# Patient Record
Sex: FEMALE | Race: WHITE | Employment: OTHER
[De-identification: names, ages, dates, MRNs, and addresses within clinical notes are randomized per-mention and may not be internally consistent; named-entity substitution may affect disease eponyms.]

---

## 2017-03-13 ENCOUNTER — RECORDS - HEALTHEAST (OUTPATIENT)
Dept: ADMINISTRATIVE | Facility: OTHER | Age: 77
End: 2017-03-13

## 2017-04-18 ENCOUNTER — AMBULATORY - HEALTHEAST (OUTPATIENT)
Dept: ONCOLOGY | Facility: CLINIC | Age: 77
End: 2017-04-18

## 2017-04-18 DIAGNOSIS — C50.512 BREAST CANCER OF LOWER-OUTER QUADRANT OF LEFT FEMALE BREAST (H): ICD-10-CM

## 2017-04-19 ENCOUNTER — OFFICE VISIT - HEALTHEAST (OUTPATIENT)
Dept: ONCOLOGY | Facility: CLINIC | Age: 77
End: 2017-04-19

## 2017-04-19 ENCOUNTER — AMBULATORY - HEALTHEAST (OUTPATIENT)
Dept: INFUSION THERAPY | Facility: CLINIC | Age: 77
End: 2017-04-19

## 2017-04-19 DIAGNOSIS — C50.512 BREAST CANCER OF LOWER-OUTER QUADRANT OF LEFT FEMALE BREAST (H): ICD-10-CM

## 2017-04-19 DIAGNOSIS — Z12.31 ENCOUNTER FOR SCREENING MAMMOGRAM FOR MALIGNANT NEOPLASM OF BREAST: ICD-10-CM

## 2017-04-19 ASSESSMENT — MIFFLIN-ST. JEOR: SCORE: 1201.25

## 2017-06-18 ENCOUNTER — COMMUNICATION - HEALTHEAST (OUTPATIENT)
Dept: FAMILY MEDICINE | Facility: CLINIC | Age: 77
End: 2017-06-18

## 2017-06-18 DIAGNOSIS — K21.9 GASTROESOPHAGEAL REFLUX DISEASE WITHOUT ESOPHAGITIS: ICD-10-CM

## 2017-06-18 DIAGNOSIS — I10 UNSPECIFIED ESSENTIAL HYPERTENSION: ICD-10-CM

## 2017-06-18 DIAGNOSIS — E78.00 PURE HYPERCHOLESTEROLEMIA: ICD-10-CM

## 2017-06-20 ENCOUNTER — COMMUNICATION - HEALTHEAST (OUTPATIENT)
Dept: ADMINISTRATIVE | Facility: CLINIC | Age: 77
End: 2017-06-20

## 2017-06-20 ENCOUNTER — OFFICE VISIT - HEALTHEAST (OUTPATIENT)
Dept: FAMILY MEDICINE | Facility: CLINIC | Age: 77
End: 2017-06-20

## 2017-06-20 DIAGNOSIS — E78.00 PURE HYPERCHOLESTEROLEMIA: ICD-10-CM

## 2017-06-20 DIAGNOSIS — C50.512 BREAST CANCER OF LOWER-OUTER QUADRANT OF LEFT FEMALE BREAST (H): ICD-10-CM

## 2017-06-20 DIAGNOSIS — M89.9 DISORDER OF BONE AND CARTILAGE: ICD-10-CM

## 2017-06-20 DIAGNOSIS — Z01.810 PRE-OPERATIVE CARDIOVASCULAR EXAMINATION: ICD-10-CM

## 2017-06-20 DIAGNOSIS — M20.41 HAMMER TOE OF RIGHT FOOT: ICD-10-CM

## 2017-06-20 DIAGNOSIS — I10 ESSENTIAL HYPERTENSION: ICD-10-CM

## 2017-06-20 DIAGNOSIS — I48.91 ATRIAL FIBRILLATION (H): ICD-10-CM

## 2017-06-20 DIAGNOSIS — K21.9 GASTROESOPHAGEAL REFLUX DISEASE WITHOUT ESOPHAGITIS: ICD-10-CM

## 2017-06-20 DIAGNOSIS — M94.9 DISORDER OF BONE AND CARTILAGE: ICD-10-CM

## 2017-06-20 DIAGNOSIS — R79.89 ELEVATED LFTS: ICD-10-CM

## 2017-06-20 LAB
ATRIAL RATE - MUSE: 69 BPM
CHOLEST SERPL-MCNC: 165 MG/DL
DIASTOLIC BLOOD PRESSURE - MUSE: NORMAL MMHG
FASTING STATUS PATIENT QL REPORTED: ABNORMAL
HDLC SERPL-MCNC: 44 MG/DL
INTERPRETATION ECG - MUSE: NORMAL
LDLC SERPL CALC-MCNC: 102 MG/DL
P AXIS - MUSE: NORMAL DEGREES
PR INTERVAL - MUSE: NORMAL MS
QRS DURATION - MUSE: 76 MS
QT - MUSE: 400 MS
QTC - MUSE: 444 MS
R AXIS - MUSE: -12 DEGREES
SYSTOLIC BLOOD PRESSURE - MUSE: NORMAL MMHG
T AXIS - MUSE: 5 DEGREES
TRIGL SERPL-MCNC: 96 MG/DL
VENTRICULAR RATE- MUSE: 74 BPM

## 2017-06-20 ASSESSMENT — MIFFLIN-ST. JEOR: SCORE: 1195.01

## 2017-06-21 ENCOUNTER — HOSPITAL ENCOUNTER (OUTPATIENT)
Dept: CARDIOLOGY | Facility: CLINIC | Age: 77
Discharge: HOME | End: 2017-06-21
Attending: FAMILY MEDICINE

## 2017-06-21 ENCOUNTER — COMMUNICATION - HEALTHEAST (OUTPATIENT)
Dept: FAMILY MEDICINE | Facility: CLINIC | Age: 77
End: 2017-06-21

## 2017-06-21 DIAGNOSIS — I48.91 ATRIAL FIBRILLATION (H): ICD-10-CM

## 2017-06-21 LAB
AORTIC ROOT: 3 CM
BSA FOR ECHO PROCEDURE: 1.83 M2
CV BLOOD PRESSURE: NORMAL MMHG
CV ECHO HEIGHT: 64 IN
CV ECHO WEIGHT: 164 LBS
DOP CALC LVOT AREA: 2.83 CM2
DOP CALC LVOT DIAMETER: 1.9 CM
DOP CALC LVOT PEAK VEL: 77.5 CM/S
DOP CALC LVOT STROKE VOLUME: 43.1 CM3
DOP CALCLVOT PEAK VEL VTI: 15.2 CM
EJECTION FRACTION: 55 % (ref 55–75)
FRACTIONAL SHORTENING: 25.8 % (ref 28–44)
INTERVENTRICULAR SEPTUM IN END DIASTOLE: 0.89 CM (ref 0.6–0.9)
IVS/PW RATIO: 0.9
LA AREA 1: 20.7 CM2
LA AREA 2: 21.7 CM2
LEFT ATRIUM LENGTH: 5.4 CM
LEFT ATRIUM SIZE: 3.9 CM
LEFT ATRIUM TO AORTIC ROOT RATIO: 1.3 NO UNITS
LEFT ATRIUM VOLUME INDEX: 38.6 ML/M2
LEFT ATRIUM VOLUME: 70.7 CM3
LEFT VENTRICLE DIASTOLIC VOLUME INDEX: 36.1 CM3/M2 (ref 34–74)
LEFT VENTRICLE DIASTOLIC VOLUME: 66 CM3 (ref 46–106)
LEFT VENTRICLE MASS INDEX: 77.3 G/M2
LEFT VENTRICLE SYSTOLIC VOLUME INDEX: 16.4 CM3/M2 (ref 11–31)
LEFT VENTRICLE SYSTOLIC VOLUME: 30 CM3 (ref 14–42)
LEFT VENTRICULAR INTERNAL DIMENSION IN DIASTOLE: 4.5 CM (ref 3.8–5.2)
LEFT VENTRICULAR INTERNAL DIMENSION IN SYSTOLE: 3.34 CM (ref 2.2–3.5)
LEFT VENTRICULAR MASS: 141.5 G
LEFT VENTRICULAR OUTFLOW TRACT MEAN GRADIENT: 1 MMHG
LEFT VENTRICULAR OUTFLOW TRACT MEAN VELOCITY: 48 CM/S
LEFT VENTRICULAR OUTFLOW TRACT PEAK GRADIENT: 2 MMHG
LEFT VENTRICULAR POSTERIOR WALL IN END DIASTOLE: 0.99 CM (ref 0.6–0.9)
LV STROKE VOLUME INDEX: 23.5 ML/M2
MITRAL REGURGITANT VELOCITY TIME INTEGRAL: 212 CM
MR FLOW: 34 CM3
MR MEAN GRADIENT: 112 MMHG
MR MEAN VELOCITY: 511 CM/S
MR PEAK GRADIENT: 147.4 MMHG
MR PISA EROA: 0.2 CM2
MR PISA RADIUS: 0.7 CM
MR PISA VN NYQUIST: 30.8 CM/S
MV DECELERATION TIME: 155 MS
MV E'TISSUE VEL-LAT: 15.1 CM/S
MV LATERAL E/E' RATIO: 7.7
MV PEAK E VELOCITY: 117 CM/S
MV REGURGITANT VOLUME: 33.1 CC
NUC REST DIASTOLIC VOLUME INDEX: 2624 LBS
NUC REST SYSTOLIC VOLUME INDEX: 64 IN
PISA MR PEAK VEL: 607 CM/S
TRICUSPID REGURGITATION PEAK PRESSURE GRADIENT: 42.5 MMHG
TRICUSPID VALVE ANULAR PLANE SYSTOLIC EXCURSION: 1.7 CM
TRICUSPID VALVE PEAK REGURGITANT VELOCITY: 326 CM/S

## 2017-06-21 ASSESSMENT — MIFFLIN-ST. JEOR: SCORE: 1183.9

## 2017-08-02 ENCOUNTER — COMMUNICATION - HEALTHEAST (OUTPATIENT)
Dept: FAMILY MEDICINE | Facility: CLINIC | Age: 77
End: 2017-08-02

## 2017-08-03 ENCOUNTER — OFFICE VISIT - HEALTHEAST (OUTPATIENT)
Dept: GERIATRICS | Facility: CLINIC | Age: 77
End: 2017-08-03

## 2017-08-03 DIAGNOSIS — N30.00 ACUTE CYSTITIS WITHOUT HEMATURIA: ICD-10-CM

## 2017-08-03 DIAGNOSIS — G40.209 PARTIAL SYMPTOMATIC EPILEPSY WITH COMPLEX PARTIAL SEIZURES, NOT INTRACTABLE, WITHOUT STATUS EPILEPTICUS (H): ICD-10-CM

## 2017-08-03 DIAGNOSIS — G93.40 ENCEPHALOPATHY: ICD-10-CM

## 2017-08-03 DIAGNOSIS — I48.20 CHRONIC ATRIAL FIBRILLATION (H): ICD-10-CM

## 2017-08-08 ENCOUNTER — OFFICE VISIT - HEALTHEAST (OUTPATIENT)
Dept: GERIATRICS | Facility: CLINIC | Age: 77
End: 2017-08-08

## 2017-08-08 DIAGNOSIS — G40.209 PARTIAL SYMPTOMATIC EPILEPSY WITH COMPLEX PARTIAL SEIZURES, NOT INTRACTABLE, WITHOUT STATUS EPILEPTICUS (H): ICD-10-CM

## 2017-08-08 DIAGNOSIS — G93.40 ENCEPHALOPATHY: ICD-10-CM

## 2017-08-08 DIAGNOSIS — N30.00 ACUTE CYSTITIS WITHOUT HEMATURIA: ICD-10-CM

## 2017-08-08 DIAGNOSIS — I48.20 CHRONIC ATRIAL FIBRILLATION (H): ICD-10-CM

## 2017-08-10 ENCOUNTER — OFFICE VISIT - HEALTHEAST (OUTPATIENT)
Dept: GERIATRICS | Facility: CLINIC | Age: 77
End: 2017-08-10

## 2017-08-10 DIAGNOSIS — I48.20 CHRONIC ATRIAL FIBRILLATION (H): ICD-10-CM

## 2017-08-10 DIAGNOSIS — I10 ESSENTIAL HYPERTENSION: ICD-10-CM

## 2017-08-10 DIAGNOSIS — R56.9 CONVULSIONS, UNSPECIFIED CONVULSION TYPE (H): ICD-10-CM

## 2017-08-11 ENCOUNTER — AMBULATORY - HEALTHEAST (OUTPATIENT)
Dept: GERIATRICS | Facility: CLINIC | Age: 77
End: 2017-08-11

## 2017-08-14 ENCOUNTER — RECORDS - HEALTHEAST (OUTPATIENT)
Dept: ADMINISTRATIVE | Facility: OTHER | Age: 77
End: 2017-08-14

## 2017-08-21 ENCOUNTER — COMMUNICATION - HEALTHEAST (OUTPATIENT)
Dept: INTERNAL MEDICINE | Facility: CLINIC | Age: 77
End: 2017-08-21

## 2017-08-21 ENCOUNTER — OFFICE VISIT - HEALTHEAST (OUTPATIENT)
Dept: FAMILY MEDICINE | Facility: CLINIC | Age: 77
End: 2017-08-21

## 2017-08-21 DIAGNOSIS — I10 ESSENTIAL HYPERTENSION WITH GOAL BLOOD PRESSURE LESS THAN 140/90: ICD-10-CM

## 2017-08-21 DIAGNOSIS — I48.20 CHRONIC A-FIB (H): ICD-10-CM

## 2017-08-21 DIAGNOSIS — M94.9 DISORDER OF BONE AND CARTILAGE: ICD-10-CM

## 2017-08-21 DIAGNOSIS — E78.00 PURE HYPERCHOLESTEROLEMIA: ICD-10-CM

## 2017-08-21 DIAGNOSIS — G40.209 PARTIAL SYMPTOMATIC EPILEPSY WITH COMPLEX PARTIAL SEIZURES, NOT INTRACTABLE, WITHOUT STATUS EPILEPTICUS (H): ICD-10-CM

## 2017-08-21 DIAGNOSIS — M89.9 DISORDER OF BONE AND CARTILAGE: ICD-10-CM

## 2017-08-21 DIAGNOSIS — Z76.89 ENCOUNTER TO ESTABLISH CARE WITH NEW DOCTOR: ICD-10-CM

## 2017-08-23 ENCOUNTER — COMMUNICATION - HEALTHEAST (OUTPATIENT)
Dept: FAMILY MEDICINE | Facility: CLINIC | Age: 77
End: 2017-08-23

## 2017-08-24 ENCOUNTER — COMMUNICATION - HEALTHEAST (OUTPATIENT)
Dept: SCHEDULING | Facility: CLINIC | Age: 77
End: 2017-08-24

## 2017-08-24 ENCOUNTER — COMMUNICATION - HEALTHEAST (OUTPATIENT)
Dept: FAMILY MEDICINE | Facility: CLINIC | Age: 77
End: 2017-08-24

## 2017-08-24 DIAGNOSIS — I48.91 ATRIAL FIBRILLATION, UNSPECIFIED TYPE (H): ICD-10-CM

## 2017-08-28 ENCOUNTER — COMMUNICATION - HEALTHEAST (OUTPATIENT)
Dept: INTERNAL MEDICINE | Facility: CLINIC | Age: 77
End: 2017-08-28

## 2017-08-28 ENCOUNTER — AMBULATORY - HEALTHEAST (OUTPATIENT)
Dept: LAB | Facility: CLINIC | Age: 77
End: 2017-08-28

## 2017-08-28 DIAGNOSIS — I48.20 CHRONIC A-FIB (H): ICD-10-CM

## 2017-09-05 ENCOUNTER — COMMUNICATION - HEALTHEAST (OUTPATIENT)
Dept: INTERNAL MEDICINE | Facility: CLINIC | Age: 77
End: 2017-09-05

## 2017-09-05 ENCOUNTER — AMBULATORY - HEALTHEAST (OUTPATIENT)
Dept: LAB | Facility: CLINIC | Age: 77
End: 2017-09-05

## 2017-09-05 DIAGNOSIS — I48.20 CHRONIC A-FIB (H): ICD-10-CM

## 2017-09-08 ENCOUNTER — COMMUNICATION - HEALTHEAST (OUTPATIENT)
Dept: FAMILY MEDICINE | Facility: CLINIC | Age: 77
End: 2017-09-08

## 2017-09-12 ENCOUNTER — COMMUNICATION - HEALTHEAST (OUTPATIENT)
Dept: FAMILY MEDICINE | Facility: CLINIC | Age: 77
End: 2017-09-12

## 2017-09-12 ENCOUNTER — OFFICE VISIT - HEALTHEAST (OUTPATIENT)
Dept: GERIATRICS | Facility: CLINIC | Age: 77
End: 2017-09-12

## 2017-09-12 DIAGNOSIS — S32.9XXA PELVIC FRACTURE (H): ICD-10-CM

## 2017-09-12 DIAGNOSIS — I48.20 CHRONIC ATRIAL FIBRILLATION (H): ICD-10-CM

## 2017-09-12 DIAGNOSIS — S32.502A: ICD-10-CM

## 2017-09-12 DIAGNOSIS — I10 ESSENTIAL HYPERTENSION WITH GOAL BLOOD PRESSURE LESS THAN 140/90: ICD-10-CM

## 2017-09-12 DIAGNOSIS — S32.009A: ICD-10-CM

## 2017-09-13 ENCOUNTER — COMMUNICATION - HEALTHEAST (OUTPATIENT)
Dept: INTERNAL MEDICINE | Facility: CLINIC | Age: 77
End: 2017-09-13

## 2017-09-13 DIAGNOSIS — I48.20 CHRONIC A-FIB (H): ICD-10-CM

## 2017-09-14 ENCOUNTER — OFFICE VISIT - HEALTHEAST (OUTPATIENT)
Dept: GERIATRICS | Facility: CLINIC | Age: 77
End: 2017-09-14

## 2017-09-14 DIAGNOSIS — S32.502A: ICD-10-CM

## 2017-09-14 DIAGNOSIS — R58 INTRAABDOMINAL HEMORRHAGE: ICD-10-CM

## 2017-09-14 DIAGNOSIS — I48.20 CHRONIC A-FIB (H): ICD-10-CM

## 2017-09-14 DIAGNOSIS — G40.209 PARTIAL SYMPTOMATIC EPILEPSY WITH COMPLEX PARTIAL SEIZURES, NOT INTRACTABLE, WITHOUT STATUS EPILEPTICUS (H): ICD-10-CM

## 2017-09-14 DIAGNOSIS — S32.009A: ICD-10-CM

## 2017-09-19 ENCOUNTER — OFFICE VISIT - HEALTHEAST (OUTPATIENT)
Dept: GERIATRICS | Facility: CLINIC | Age: 77
End: 2017-09-19

## 2017-09-19 DIAGNOSIS — I48.20 CHRONIC A-FIB (H): ICD-10-CM

## 2017-09-19 DIAGNOSIS — G40.209 PARTIAL SYMPTOMATIC EPILEPSY WITH COMPLEX PARTIAL SEIZURES, NOT INTRACTABLE, WITHOUT STATUS EPILEPTICUS (H): ICD-10-CM

## 2017-09-19 DIAGNOSIS — S32.9XXA PELVIC FRACTURE (H): ICD-10-CM

## 2017-09-19 DIAGNOSIS — I10 ESSENTIAL HYPERTENSION WITH GOAL BLOOD PRESSURE LESS THAN 140/90: ICD-10-CM

## 2017-09-21 ENCOUNTER — OFFICE VISIT - HEALTHEAST (OUTPATIENT)
Dept: GERIATRICS | Facility: CLINIC | Age: 77
End: 2017-09-21

## 2017-09-21 DIAGNOSIS — I48.20 CHRONIC A-FIB (H): ICD-10-CM

## 2017-09-21 DIAGNOSIS — G40.209 PARTIAL SYMPTOMATIC EPILEPSY WITH COMPLEX PARTIAL SEIZURES, NOT INTRACTABLE, WITHOUT STATUS EPILEPTICUS (H): ICD-10-CM

## 2017-09-21 DIAGNOSIS — S32.9XXA PELVIC FRACTURE (H): ICD-10-CM

## 2017-09-21 DIAGNOSIS — I10 ESSENTIAL HYPERTENSION WITH GOAL BLOOD PRESSURE LESS THAN 140/90: ICD-10-CM

## 2017-09-26 ENCOUNTER — OFFICE VISIT - HEALTHEAST (OUTPATIENT)
Dept: GERIATRICS | Facility: CLINIC | Age: 77
End: 2017-09-26

## 2017-09-26 DIAGNOSIS — S32.9XXA PELVIC FRACTURE (H): ICD-10-CM

## 2017-09-26 DIAGNOSIS — G40.209 PARTIAL SYMPTOMATIC EPILEPSY WITH COMPLEX PARTIAL SEIZURES, NOT INTRACTABLE, WITHOUT STATUS EPILEPTICUS (H): ICD-10-CM

## 2017-09-26 DIAGNOSIS — I48.20 CHRONIC A-FIB (H): ICD-10-CM

## 2017-09-26 DIAGNOSIS — I10 ESSENTIAL HYPERTENSION WITH GOAL BLOOD PRESSURE LESS THAN 140/90: ICD-10-CM

## 2017-09-28 ENCOUNTER — RECORDS - HEALTHEAST (OUTPATIENT)
Dept: ADMINISTRATIVE | Facility: OTHER | Age: 77
End: 2017-09-28

## 2017-09-28 ENCOUNTER — OFFICE VISIT - HEALTHEAST (OUTPATIENT)
Dept: GERIATRICS | Facility: CLINIC | Age: 77
End: 2017-09-28

## 2017-09-28 DIAGNOSIS — I48.20 CHRONIC A-FIB (H): ICD-10-CM

## 2017-09-28 DIAGNOSIS — E78.00 PURE HYPERCHOLESTEROLEMIA: ICD-10-CM

## 2017-09-28 DIAGNOSIS — I10 ESSENTIAL HYPERTENSION WITH GOAL BLOOD PRESSURE LESS THAN 140/90: ICD-10-CM

## 2017-09-28 DIAGNOSIS — S32.9XXA PELVIC FRACTURE (H): ICD-10-CM

## 2017-10-02 ENCOUNTER — OFFICE VISIT - HEALTHEAST (OUTPATIENT)
Dept: GERIATRICS | Facility: CLINIC | Age: 77
End: 2017-10-02

## 2017-10-02 DIAGNOSIS — S32.502A: ICD-10-CM

## 2017-10-02 DIAGNOSIS — I48.20 CHRONIC ATRIAL FIBRILLATION (H): ICD-10-CM

## 2017-10-02 DIAGNOSIS — S32.009A: ICD-10-CM

## 2017-10-02 DIAGNOSIS — E78.00 PURE HYPERCHOLESTEROLEMIA: ICD-10-CM

## 2017-10-04 ENCOUNTER — AMBULATORY - HEALTHEAST (OUTPATIENT)
Dept: GERIATRICS | Facility: CLINIC | Age: 77
End: 2017-10-04

## 2017-10-04 ENCOUNTER — AMBULATORY - HEALTHEAST (OUTPATIENT)
Dept: LAB | Facility: CLINIC | Age: 77
End: 2017-10-04

## 2017-10-04 ENCOUNTER — COMMUNICATION - HEALTHEAST (OUTPATIENT)
Dept: INTERNAL MEDICINE | Facility: CLINIC | Age: 77
End: 2017-10-04

## 2017-10-04 DIAGNOSIS — I48.20 CHRONIC ATRIAL FIBRILLATION (H): ICD-10-CM

## 2017-10-04 DIAGNOSIS — I48.20 CHRONIC A-FIB (H): ICD-10-CM

## 2017-10-05 ENCOUNTER — COMMUNICATION - HEALTHEAST (OUTPATIENT)
Dept: GERIATRICS | Facility: CLINIC | Age: 77
End: 2017-10-05

## 2017-10-10 ENCOUNTER — RECORDS - HEALTHEAST (OUTPATIENT)
Dept: ADMINISTRATIVE | Facility: OTHER | Age: 77
End: 2017-10-10

## 2017-10-16 ENCOUNTER — COMMUNICATION - HEALTHEAST (OUTPATIENT)
Dept: FAMILY MEDICINE | Facility: CLINIC | Age: 77
End: 2017-10-16

## 2017-10-16 ENCOUNTER — COMMUNICATION - HEALTHEAST (OUTPATIENT)
Dept: INTERNAL MEDICINE | Facility: CLINIC | Age: 77
End: 2017-10-16

## 2017-10-16 ENCOUNTER — OFFICE VISIT - HEALTHEAST (OUTPATIENT)
Dept: FAMILY MEDICINE | Facility: CLINIC | Age: 77
End: 2017-10-16

## 2017-10-16 DIAGNOSIS — S32.009A: ICD-10-CM

## 2017-10-16 DIAGNOSIS — M89.9 DISORDER OF BONE AND CARTILAGE: ICD-10-CM

## 2017-10-16 DIAGNOSIS — M94.9 DISORDER OF BONE AND CARTILAGE: ICD-10-CM

## 2017-10-16 DIAGNOSIS — I48.20 CHRONIC A-FIB (H): ICD-10-CM

## 2017-10-16 DIAGNOSIS — Z00.00 PREVENTATIVE HEALTH CARE: ICD-10-CM

## 2017-10-16 DIAGNOSIS — I48.20 CHRONIC ATRIAL FIBRILLATION (H): ICD-10-CM

## 2017-10-16 DIAGNOSIS — G40.209: ICD-10-CM

## 2017-10-16 DIAGNOSIS — S32.9XXA PELVIC FRACTURE (H): ICD-10-CM

## 2017-10-16 DIAGNOSIS — I10 ESSENTIAL HYPERTENSION: ICD-10-CM

## 2017-10-16 DIAGNOSIS — D64.9 ANEMIA, UNSPECIFIED TYPE: ICD-10-CM

## 2017-10-17 ENCOUNTER — COMMUNICATION - HEALTHEAST (OUTPATIENT)
Dept: FAMILY MEDICINE | Facility: CLINIC | Age: 77
End: 2017-10-17

## 2017-10-23 ENCOUNTER — AMBULATORY - HEALTHEAST (OUTPATIENT)
Dept: LAB | Facility: CLINIC | Age: 77
End: 2017-10-23

## 2017-10-23 ENCOUNTER — COMMUNICATION - HEALTHEAST (OUTPATIENT)
Dept: INTERNAL MEDICINE | Facility: CLINIC | Age: 77
End: 2017-10-23

## 2017-10-23 DIAGNOSIS — I48.20 CHRONIC A-FIB (H): ICD-10-CM

## 2017-10-24 ENCOUNTER — HOSPITAL ENCOUNTER (OUTPATIENT)
Dept: NEUROLOGY | Facility: HOSPITAL | Age: 77
Discharge: HOME | End: 2017-10-24
Attending: PSYCHIATRY & NEUROLOGY

## 2017-10-24 DIAGNOSIS — G40.209 COMPLEX PARTIAL SEIZURE (H): ICD-10-CM

## 2017-10-27 ENCOUNTER — COMMUNICATION - HEALTHEAST (OUTPATIENT)
Dept: INTERNAL MEDICINE | Facility: CLINIC | Age: 77
End: 2017-10-27

## 2017-10-27 ENCOUNTER — AMBULATORY - HEALTHEAST (OUTPATIENT)
Dept: LAB | Facility: CLINIC | Age: 77
End: 2017-10-27

## 2017-10-27 ENCOUNTER — COMMUNICATION - HEALTHEAST (OUTPATIENT)
Dept: FAMILY MEDICINE | Facility: CLINIC | Age: 77
End: 2017-10-27

## 2017-10-27 DIAGNOSIS — I48.20 CHRONIC A-FIB (H): ICD-10-CM

## 2017-10-27 DIAGNOSIS — I48.20 CHRONIC ATRIAL FIBRILLATION (H): ICD-10-CM

## 2017-10-31 ENCOUNTER — COMMUNICATION - HEALTHEAST (OUTPATIENT)
Dept: INTERNAL MEDICINE | Facility: CLINIC | Age: 77
End: 2017-10-31

## 2017-10-31 ENCOUNTER — AMBULATORY - HEALTHEAST (OUTPATIENT)
Dept: LAB | Facility: CLINIC | Age: 77
End: 2017-10-31

## 2017-10-31 DIAGNOSIS — I48.20 CHRONIC A-FIB (H): ICD-10-CM

## 2017-11-02 ENCOUNTER — OFFICE VISIT - HEALTHEAST (OUTPATIENT)
Dept: ONCOLOGY | Facility: CLINIC | Age: 77
End: 2017-11-02

## 2017-11-02 ENCOUNTER — RECORDS - HEALTHEAST (OUTPATIENT)
Dept: BONE DENSITY | Facility: CLINIC | Age: 77
End: 2017-11-02

## 2017-11-02 ENCOUNTER — RECORDS - HEALTHEAST (OUTPATIENT)
Dept: ADMINISTRATIVE | Facility: OTHER | Age: 77
End: 2017-11-02

## 2017-11-02 ENCOUNTER — HOSPITAL ENCOUNTER (OUTPATIENT)
Dept: MAMMOGRAPHY | Facility: CLINIC | Age: 77
Discharge: HOME | End: 2017-11-02
Attending: INTERNAL MEDICINE

## 2017-11-02 ENCOUNTER — AMBULATORY - HEALTHEAST (OUTPATIENT)
Dept: INFUSION THERAPY | Facility: CLINIC | Age: 77
End: 2017-11-02

## 2017-11-02 DIAGNOSIS — M94.9 DISORDER OF CARTILAGE, UNSPECIFIED: ICD-10-CM

## 2017-11-02 DIAGNOSIS — C50.512 MALIGNANT NEOPLASM OF LOWER-OUTER QUADRANT OF LEFT BREAST OF FEMALE, ESTROGEN RECEPTOR NEGATIVE (H): ICD-10-CM

## 2017-11-02 DIAGNOSIS — C50.512 BREAST CANCER OF LOWER-OUTER QUADRANT OF LEFT FEMALE BREAST (H): ICD-10-CM

## 2017-11-02 DIAGNOSIS — Z12.31 ENCOUNTER FOR SCREENING MAMMOGRAM FOR MALIGNANT NEOPLASM OF BREAST: ICD-10-CM

## 2017-11-02 DIAGNOSIS — M89.9 DISORDER OF BONE, UNSPECIFIED: ICD-10-CM

## 2017-11-02 DIAGNOSIS — Z17.1 MALIGNANT NEOPLASM OF LOWER-OUTER QUADRANT OF LEFT BREAST OF FEMALE, ESTROGEN RECEPTOR NEGATIVE (H): ICD-10-CM

## 2017-11-02 ASSESSMENT — MIFFLIN-ST. JEOR: SCORE: 1098.39

## 2017-11-06 ENCOUNTER — AMBULATORY - HEALTHEAST (OUTPATIENT)
Dept: LAB | Facility: CLINIC | Age: 77
End: 2017-11-06

## 2017-11-06 ENCOUNTER — COMMUNICATION - HEALTHEAST (OUTPATIENT)
Dept: INTERNAL MEDICINE | Facility: CLINIC | Age: 77
End: 2017-11-06

## 2017-11-06 DIAGNOSIS — I48.20 CHRONIC A-FIB (H): ICD-10-CM

## 2017-11-20 ENCOUNTER — COMMUNICATION - HEALTHEAST (OUTPATIENT)
Dept: INTERNAL MEDICINE | Facility: CLINIC | Age: 77
End: 2017-11-20

## 2017-11-20 ENCOUNTER — AMBULATORY - HEALTHEAST (OUTPATIENT)
Dept: LAB | Facility: CLINIC | Age: 77
End: 2017-11-20

## 2017-11-20 DIAGNOSIS — I48.20 CHRONIC A-FIB (H): ICD-10-CM

## 2017-11-28 ENCOUNTER — COMMUNICATION - HEALTHEAST (OUTPATIENT)
Dept: INTERNAL MEDICINE | Facility: CLINIC | Age: 77
End: 2017-11-28

## 2017-11-28 ENCOUNTER — AMBULATORY - HEALTHEAST (OUTPATIENT)
Dept: LAB | Facility: CLINIC | Age: 77
End: 2017-11-28

## 2017-11-28 DIAGNOSIS — I48.20 CHRONIC A-FIB (H): ICD-10-CM

## 2017-12-05 ENCOUNTER — AMBULATORY - HEALTHEAST (OUTPATIENT)
Dept: LAB | Facility: CLINIC | Age: 77
End: 2017-12-05

## 2017-12-05 ENCOUNTER — COMMUNICATION - HEALTHEAST (OUTPATIENT)
Dept: INTERNAL MEDICINE | Facility: CLINIC | Age: 77
End: 2017-12-05

## 2017-12-05 DIAGNOSIS — I48.20 CHRONIC A-FIB (H): ICD-10-CM

## 2017-12-10 ENCOUNTER — COMMUNICATION - HEALTHEAST (OUTPATIENT)
Dept: FAMILY MEDICINE | Facility: CLINIC | Age: 77
End: 2017-12-10

## 2017-12-10 DIAGNOSIS — E78.00 PURE HYPERCHOLESTEROLEMIA: ICD-10-CM

## 2017-12-10 DIAGNOSIS — I10 ESSENTIAL HYPERTENSION: ICD-10-CM

## 2017-12-10 DIAGNOSIS — K21.9 GASTROESOPHAGEAL REFLUX DISEASE WITHOUT ESOPHAGITIS: ICD-10-CM

## 2017-12-12 ENCOUNTER — COMMUNICATION - HEALTHEAST (OUTPATIENT)
Dept: INTERNAL MEDICINE | Facility: CLINIC | Age: 77
End: 2017-12-12

## 2017-12-12 ENCOUNTER — AMBULATORY - HEALTHEAST (OUTPATIENT)
Dept: LAB | Facility: CLINIC | Age: 77
End: 2017-12-12

## 2017-12-12 DIAGNOSIS — I48.20 CHRONIC A-FIB (H): ICD-10-CM

## 2017-12-19 ENCOUNTER — COMMUNICATION - HEALTHEAST (OUTPATIENT)
Dept: INTERNAL MEDICINE | Facility: CLINIC | Age: 77
End: 2017-12-19

## 2017-12-19 ENCOUNTER — AMBULATORY - HEALTHEAST (OUTPATIENT)
Dept: LAB | Facility: CLINIC | Age: 77
End: 2017-12-19

## 2017-12-19 DIAGNOSIS — I48.20 CHRONIC A-FIB (H): ICD-10-CM

## 2018-01-02 ENCOUNTER — AMBULATORY - HEALTHEAST (OUTPATIENT)
Dept: LAB | Facility: CLINIC | Age: 78
End: 2018-01-02

## 2018-01-02 ENCOUNTER — COMMUNICATION - HEALTHEAST (OUTPATIENT)
Dept: NURSING | Facility: CLINIC | Age: 78
End: 2018-01-02

## 2018-01-02 DIAGNOSIS — I48.20 CHRONIC A-FIB (H): ICD-10-CM

## 2018-01-02 LAB — INR PPP: 2 (ref 0.9–1.1)

## 2018-01-23 ENCOUNTER — COMMUNICATION - HEALTHEAST (OUTPATIENT)
Dept: INTERNAL MEDICINE | Facility: CLINIC | Age: 78
End: 2018-01-23

## 2018-01-23 ENCOUNTER — AMBULATORY - HEALTHEAST (OUTPATIENT)
Dept: LAB | Facility: CLINIC | Age: 78
End: 2018-01-23

## 2018-01-23 DIAGNOSIS — I48.20 CHRONIC A-FIB (H): ICD-10-CM

## 2018-01-23 LAB — INR PPP: 2.3 (ref 0.9–1.1)

## 2018-02-20 ENCOUNTER — AMBULATORY - HEALTHEAST (OUTPATIENT)
Dept: LAB | Facility: CLINIC | Age: 78
End: 2018-02-20

## 2018-02-20 ENCOUNTER — COMMUNICATION - HEALTHEAST (OUTPATIENT)
Dept: INTERNAL MEDICINE | Facility: CLINIC | Age: 78
End: 2018-02-20

## 2018-02-20 DIAGNOSIS — I48.20 CHRONIC A-FIB (H): ICD-10-CM

## 2018-02-20 LAB — INR PPP: 2.1 (ref 0.9–1.1)

## 2018-03-12 ENCOUNTER — COMMUNICATION - HEALTHEAST (OUTPATIENT)
Dept: FAMILY MEDICINE | Facility: CLINIC | Age: 78
End: 2018-03-12

## 2018-03-12 DIAGNOSIS — I10 ESSENTIAL HYPERTENSION: ICD-10-CM

## 2018-03-12 DIAGNOSIS — K21.9 GASTROESOPHAGEAL REFLUX DISEASE WITHOUT ESOPHAGITIS: ICD-10-CM

## 2018-03-12 DIAGNOSIS — E78.00 PURE HYPERCHOLESTEROLEMIA: ICD-10-CM

## 2018-03-29 ENCOUNTER — COMMUNICATION - HEALTHEAST (OUTPATIENT)
Dept: FAMILY MEDICINE | Facility: CLINIC | Age: 78
End: 2018-03-29

## 2018-03-29 DIAGNOSIS — I10 ESSENTIAL HYPERTENSION: ICD-10-CM

## 2018-04-03 ENCOUNTER — COMMUNICATION - HEALTHEAST (OUTPATIENT)
Dept: ANTICOAGULATION | Facility: CLINIC | Age: 78
End: 2018-04-03

## 2018-04-03 ENCOUNTER — AMBULATORY - HEALTHEAST (OUTPATIENT)
Dept: LAB | Facility: CLINIC | Age: 78
End: 2018-04-03

## 2018-04-03 DIAGNOSIS — I48.20 CHRONIC A-FIB (H): ICD-10-CM

## 2018-04-03 LAB — INR PPP: 1.6 (ref 0.9–1.1)

## 2018-04-05 ENCOUNTER — COMMUNICATION - HEALTHEAST (OUTPATIENT)
Dept: FAMILY MEDICINE | Facility: CLINIC | Age: 78
End: 2018-04-05

## 2018-04-19 ENCOUNTER — COMMUNICATION - HEALTHEAST (OUTPATIENT)
Dept: FAMILY MEDICINE | Facility: CLINIC | Age: 78
End: 2018-04-19

## 2018-04-19 DIAGNOSIS — I48.20 CHRONIC A-FIB (H): ICD-10-CM

## 2018-04-19 DIAGNOSIS — I48.20 CHRONIC ATRIAL FIBRILLATION (H): ICD-10-CM

## 2018-04-20 ENCOUNTER — COMMUNICATION - HEALTHEAST (OUTPATIENT)
Dept: ANTICOAGULATION | Facility: CLINIC | Age: 78
End: 2018-04-20

## 2018-04-20 ENCOUNTER — OFFICE VISIT - HEALTHEAST (OUTPATIENT)
Dept: FAMILY MEDICINE | Facility: CLINIC | Age: 78
End: 2018-04-20

## 2018-04-20 DIAGNOSIS — D32.9 MENINGIOMA (H): ICD-10-CM

## 2018-04-20 DIAGNOSIS — I10 ESSENTIAL HYPERTENSION: ICD-10-CM

## 2018-04-20 DIAGNOSIS — E78.00 PURE HYPERCHOLESTEROLEMIA: ICD-10-CM

## 2018-04-20 DIAGNOSIS — M94.9 DISORDER OF BONE AND CARTILAGE: ICD-10-CM

## 2018-04-20 DIAGNOSIS — I48.20 CHRONIC A-FIB (H): ICD-10-CM

## 2018-04-20 DIAGNOSIS — M89.9 DISORDER OF BONE AND CARTILAGE: ICD-10-CM

## 2018-04-20 LAB
ALBUMIN SERPL-MCNC: 3.8 G/DL (ref 3.5–5)
ALP SERPL-CCNC: 88 U/L (ref 45–120)
ALT SERPL W P-5'-P-CCNC: 18 U/L (ref 0–45)
ANION GAP SERPL CALCULATED.3IONS-SCNC: 14 MMOL/L (ref 5–18)
AST SERPL W P-5'-P-CCNC: 21 U/L (ref 0–40)
BILIRUB SERPL-MCNC: 0.6 MG/DL (ref 0–1)
BUN SERPL-MCNC: 17 MG/DL (ref 8–28)
CALCIUM SERPL-MCNC: 9.8 MG/DL (ref 8.5–10.5)
CHLORIDE BLD-SCNC: 106 MMOL/L (ref 98–107)
CHOLEST SERPL-MCNC: 167 MG/DL
CO2 SERPL-SCNC: 24 MMOL/L (ref 22–31)
CREAT SERPL-MCNC: 0.93 MG/DL (ref 0.6–1.1)
FASTING STATUS PATIENT QL REPORTED: YES
GFR SERPL CREATININE-BSD FRML MDRD: 58 ML/MIN/1.73M2
GLUCOSE BLD-MCNC: 93 MG/DL (ref 70–125)
HDLC SERPL-MCNC: 49 MG/DL
INR PPP: 1.7 (ref 0.9–1.1)
LDLC SERPL CALC-MCNC: 98 MG/DL
POTASSIUM BLD-SCNC: 4 MMOL/L (ref 3.5–5)
PROT SERPL-MCNC: 7.1 G/DL (ref 6–8)
SODIUM SERPL-SCNC: 144 MMOL/L (ref 136–145)
TRIGL SERPL-MCNC: 98 MG/DL

## 2018-04-23 ENCOUNTER — COMMUNICATION - HEALTHEAST (OUTPATIENT)
Dept: FAMILY MEDICINE | Facility: CLINIC | Age: 78
End: 2018-04-23

## 2018-04-23 LAB — 25(OH)D3 SERPL-MCNC: 52.1 NG/ML (ref 30–80)

## 2018-04-30 ENCOUNTER — COMMUNICATION - HEALTHEAST (OUTPATIENT)
Dept: ANTICOAGULATION | Facility: CLINIC | Age: 78
End: 2018-04-30

## 2018-04-30 ENCOUNTER — AMBULATORY - HEALTHEAST (OUTPATIENT)
Dept: LAB | Facility: CLINIC | Age: 78
End: 2018-04-30

## 2018-04-30 DIAGNOSIS — I48.20 CHRONIC A-FIB (H): ICD-10-CM

## 2018-04-30 LAB — INR PPP: 2.4 (ref 0.9–1.1)

## 2018-05-02 ENCOUNTER — AMBULATORY - HEALTHEAST (OUTPATIENT)
Dept: INFUSION THERAPY | Facility: CLINIC | Age: 78
End: 2018-05-02

## 2018-05-02 ENCOUNTER — OFFICE VISIT - HEALTHEAST (OUTPATIENT)
Dept: ONCOLOGY | Facility: CLINIC | Age: 78
End: 2018-05-02

## 2018-05-02 DIAGNOSIS — C50.512 MALIGNANT NEOPLASM OF LOWER-OUTER QUADRANT OF LEFT BREAST OF FEMALE, ESTROGEN RECEPTOR NEGATIVE (H): ICD-10-CM

## 2018-05-02 DIAGNOSIS — Z17.1 MALIGNANT NEOPLASM OF LOWER-OUTER QUADRANT OF LEFT BREAST OF FEMALE, ESTROGEN RECEPTOR NEGATIVE (H): ICD-10-CM

## 2018-05-02 ASSESSMENT — MIFFLIN-ST. JEOR: SCORE: 1065.73

## 2018-05-04 ENCOUNTER — COMMUNICATION - HEALTHEAST (OUTPATIENT)
Dept: ANTICOAGULATION | Facility: CLINIC | Age: 78
End: 2018-05-04

## 2018-05-04 DIAGNOSIS — I48.20 CHRONIC ATRIAL FIBRILLATION (H): ICD-10-CM

## 2018-05-14 ENCOUNTER — COMMUNICATION - HEALTHEAST (OUTPATIENT)
Dept: ANTICOAGULATION | Facility: CLINIC | Age: 78
End: 2018-05-14

## 2018-05-14 ENCOUNTER — AMBULATORY - HEALTHEAST (OUTPATIENT)
Dept: LAB | Facility: CLINIC | Age: 78
End: 2018-05-14

## 2018-05-14 DIAGNOSIS — I48.20 CHRONIC A-FIB (H): ICD-10-CM

## 2018-05-14 LAB — INR PPP: 2.9 (ref 0.9–1.1)

## 2018-05-18 ENCOUNTER — COMMUNICATION - HEALTHEAST (OUTPATIENT)
Dept: FAMILY MEDICINE | Facility: CLINIC | Age: 78
End: 2018-05-18

## 2018-06-05 ENCOUNTER — AMBULATORY - HEALTHEAST (OUTPATIENT)
Dept: LAB | Facility: CLINIC | Age: 78
End: 2018-06-05

## 2018-06-05 ENCOUNTER — COMMUNICATION - HEALTHEAST (OUTPATIENT)
Dept: ANTICOAGULATION | Facility: CLINIC | Age: 78
End: 2018-06-05

## 2018-06-05 DIAGNOSIS — I48.20 CHRONIC A-FIB (H): ICD-10-CM

## 2018-06-05 DIAGNOSIS — I48.20 CHRONIC ATRIAL FIBRILLATION (H): ICD-10-CM

## 2018-06-05 LAB — INR PPP: 2.4 (ref 0.9–1.1)

## 2018-07-03 ENCOUNTER — COMMUNICATION - HEALTHEAST (OUTPATIENT)
Dept: ANTICOAGULATION | Facility: CLINIC | Age: 78
End: 2018-07-03

## 2018-07-03 ENCOUNTER — AMBULATORY - HEALTHEAST (OUTPATIENT)
Dept: LAB | Facility: CLINIC | Age: 78
End: 2018-07-03

## 2018-07-03 DIAGNOSIS — I48.20 CHRONIC ATRIAL FIBRILLATION (H): ICD-10-CM

## 2018-07-03 DIAGNOSIS — I48.20 CHRONIC A-FIB (H): ICD-10-CM

## 2018-07-03 LAB — INR PPP: 2.5 (ref 0.9–1.1)

## 2018-07-31 ENCOUNTER — COMMUNICATION - HEALTHEAST (OUTPATIENT)
Dept: ANTICOAGULATION | Facility: CLINIC | Age: 78
End: 2018-07-31

## 2018-07-31 ENCOUNTER — AMBULATORY - HEALTHEAST (OUTPATIENT)
Dept: LAB | Facility: CLINIC | Age: 78
End: 2018-07-31

## 2018-07-31 DIAGNOSIS — I48.20 CHRONIC A-FIB (H): ICD-10-CM

## 2018-07-31 DIAGNOSIS — I48.20 CHRONIC ATRIAL FIBRILLATION (H): ICD-10-CM

## 2018-07-31 LAB — INR PPP: 2.4 (ref 0.9–1.1)

## 2018-09-03 ENCOUNTER — COMMUNICATION - HEALTHEAST (OUTPATIENT)
Dept: FAMILY MEDICINE | Facility: CLINIC | Age: 78
End: 2018-09-03

## 2018-09-03 DIAGNOSIS — I10 ESSENTIAL HYPERTENSION: ICD-10-CM

## 2018-09-03 DIAGNOSIS — K21.9 GASTROESOPHAGEAL REFLUX DISEASE WITHOUT ESOPHAGITIS: ICD-10-CM

## 2018-09-03 DIAGNOSIS — E78.00 PURE HYPERCHOLESTEROLEMIA: ICD-10-CM

## 2018-09-07 ENCOUNTER — RECORDS - HEALTHEAST (OUTPATIENT)
Dept: ADMINISTRATIVE | Facility: OTHER | Age: 78
End: 2018-09-07

## 2018-09-11 ENCOUNTER — AMBULATORY - HEALTHEAST (OUTPATIENT)
Dept: LAB | Facility: CLINIC | Age: 78
End: 2018-09-11

## 2018-09-11 ENCOUNTER — COMMUNICATION - HEALTHEAST (OUTPATIENT)
Dept: ANTICOAGULATION | Facility: CLINIC | Age: 78
End: 2018-09-11

## 2018-09-11 DIAGNOSIS — I48.20 CHRONIC ATRIAL FIBRILLATION (H): ICD-10-CM

## 2018-09-11 DIAGNOSIS — I48.20 CHRONIC A-FIB (H): ICD-10-CM

## 2018-09-11 LAB — INR PPP: 3 (ref 0.9–1.1)

## 2018-09-21 ENCOUNTER — COMMUNICATION - HEALTHEAST (OUTPATIENT)
Dept: FAMILY MEDICINE | Facility: CLINIC | Age: 78
End: 2018-09-21

## 2018-09-21 DIAGNOSIS — I10 ESSENTIAL HYPERTENSION: ICD-10-CM

## 2018-10-08 ENCOUNTER — AMBULATORY - HEALTHEAST (OUTPATIENT)
Dept: LAB | Facility: CLINIC | Age: 78
End: 2018-10-08

## 2018-10-08 ENCOUNTER — COMMUNICATION - HEALTHEAST (OUTPATIENT)
Dept: ANTICOAGULATION | Facility: CLINIC | Age: 78
End: 2018-10-08

## 2018-10-08 DIAGNOSIS — I48.20 CHRONIC A-FIB (H): ICD-10-CM

## 2018-10-08 DIAGNOSIS — I48.20 CHRONIC ATRIAL FIBRILLATION (H): ICD-10-CM

## 2018-10-08 LAB — INR PPP: 2.7 (ref 0.9–1.1)

## 2018-11-02 ENCOUNTER — AMBULATORY - HEALTHEAST (OUTPATIENT)
Dept: INFUSION THERAPY | Facility: CLINIC | Age: 78
End: 2018-11-02

## 2018-11-02 ENCOUNTER — COMMUNICATION - HEALTHEAST (OUTPATIENT)
Dept: ANTICOAGULATION | Facility: CLINIC | Age: 78
End: 2018-11-02

## 2018-11-02 ENCOUNTER — OFFICE VISIT - HEALTHEAST (OUTPATIENT)
Dept: ONCOLOGY | Facility: CLINIC | Age: 78
End: 2018-11-02

## 2018-11-02 DIAGNOSIS — I48.20 CHRONIC A-FIB (H): ICD-10-CM

## 2018-11-02 DIAGNOSIS — I48.20 CHRONIC ATRIAL FIBRILLATION (H): ICD-10-CM

## 2018-11-02 DIAGNOSIS — C50.512 MALIGNANT NEOPLASM OF LOWER-OUTER QUADRANT OF LEFT BREAST OF FEMALE, ESTROGEN RECEPTOR NEGATIVE (H): ICD-10-CM

## 2018-11-02 DIAGNOSIS — Z17.1 MALIGNANT NEOPLASM OF LOWER-OUTER QUADRANT OF LEFT BREAST OF FEMALE, ESTROGEN RECEPTOR NEGATIVE (H): ICD-10-CM

## 2018-11-02 DIAGNOSIS — R56.9 CONVULSIONS, UNSPECIFIED CONVULSION TYPE (H): ICD-10-CM

## 2018-11-02 LAB
ALBUMIN SERPL-MCNC: 4.2 G/DL (ref 3.5–5)
ALP SERPL-CCNC: 64 U/L (ref 45–120)
ALT SERPL W P-5'-P-CCNC: 21 U/L (ref 0–45)
ANION GAP SERPL CALCULATED.3IONS-SCNC: 11 MMOL/L (ref 5–18)
ANION GAP SERPL CALCULATED.3IONS-SCNC: 11 MMOL/L (ref 5–18)
AST SERPL W P-5'-P-CCNC: 25 U/L (ref 0–40)
BILIRUB SERPL-MCNC: 0.6 MG/DL (ref 0–1)
BUN SERPL-MCNC: 24 MG/DL (ref 8–28)
BUN SERPL-MCNC: 24 MG/DL (ref 8–28)
CALCIUM SERPL-MCNC: 10.2 MG/DL (ref 8.5–10.5)
CALCIUM SERPL-MCNC: 10.2 MG/DL (ref 8.5–10.5)
CHLORIDE BLD-SCNC: 104 MMOL/L (ref 98–107)
CHLORIDE BLD-SCNC: 104 MMOL/L (ref 98–107)
CO2 SERPL-SCNC: 27 MMOL/L (ref 22–31)
CO2 SERPL-SCNC: 27 MMOL/L (ref 22–31)
CREAT SERPL-MCNC: 0.99 MG/DL (ref 0.6–1.1)
CREAT SERPL-MCNC: 0.99 MG/DL (ref 0.6–1.1)
ERYTHROCYTE [DISTWIDTH] IN BLOOD BY AUTOMATED COUNT: 13.2 % (ref 11–14.5)
GFR SERPL CREATININE-BSD FRML MDRD: 54 ML/MIN/1.73M2
GFR SERPL CREATININE-BSD FRML MDRD: 54 ML/MIN/1.73M2
GLUCOSE BLD-MCNC: 99 MG/DL (ref 70–125)
GLUCOSE BLD-MCNC: 99 MG/DL (ref 70–125)
HCT VFR BLD AUTO: 40.4 % (ref 35–47)
HGB BLD-MCNC: 13.4 G/DL (ref 12–16)
INR PPP: 2.37 (ref 0.9–1.1)
LEVETIRACETAM (KEPPRA): 36.1 UG/ML (ref 6–46)
MCH RBC QN AUTO: 29.8 PG (ref 27–34)
MCHC RBC AUTO-ENTMCNC: 33.2 G/DL (ref 32–36)
MCV RBC AUTO: 90 FL (ref 80–100)
PLATELET # BLD AUTO: 172 THOU/UL (ref 140–440)
PMV BLD AUTO: 10 FL (ref 8.5–12.5)
POTASSIUM BLD-SCNC: 3.6 MMOL/L (ref 3.5–5)
POTASSIUM BLD-SCNC: 3.6 MMOL/L (ref 3.5–5)
PROT SERPL-MCNC: 7.7 G/DL (ref 6–8)
RBC # BLD AUTO: 4.49 MILL/UL (ref 3.8–5.4)
SODIUM SERPL-SCNC: 142 MMOL/L (ref 136–145)
SODIUM SERPL-SCNC: 142 MMOL/L (ref 136–145)
WBC: 5.5 THOU/UL (ref 4–11)

## 2018-11-05 ENCOUNTER — OFFICE VISIT - HEALTHEAST (OUTPATIENT)
Dept: FAMILY MEDICINE | Facility: CLINIC | Age: 78
End: 2018-11-05

## 2018-11-05 ENCOUNTER — HOSPITAL ENCOUNTER (OUTPATIENT)
Dept: MAMMOGRAPHY | Facility: CLINIC | Age: 78
Discharge: HOME | End: 2018-11-05
Attending: FAMILY MEDICINE

## 2018-11-05 DIAGNOSIS — Z17.1 MALIGNANT NEOPLASM OF LOWER-OUTER QUADRANT OF LEFT BREAST OF FEMALE, ESTROGEN RECEPTOR NEGATIVE (H): ICD-10-CM

## 2018-11-05 DIAGNOSIS — C50.512 MALIGNANT NEOPLASM OF LOWER-OUTER QUADRANT OF LEFT BREAST OF FEMALE, ESTROGEN RECEPTOR NEGATIVE (H): ICD-10-CM

## 2018-11-05 DIAGNOSIS — G40.209: ICD-10-CM

## 2018-11-05 DIAGNOSIS — M94.9 DISORDER OF BONE AND CARTILAGE: ICD-10-CM

## 2018-11-05 DIAGNOSIS — E78.00 PURE HYPERCHOLESTEROLEMIA: ICD-10-CM

## 2018-11-05 DIAGNOSIS — G40.209 COMPLEX PARTIAL SEIZURE (H): ICD-10-CM

## 2018-11-05 DIAGNOSIS — M89.9 DISORDER OF BONE AND CARTILAGE: ICD-10-CM

## 2018-11-05 DIAGNOSIS — I10 ESSENTIAL HYPERTENSION: ICD-10-CM

## 2018-11-05 DIAGNOSIS — Z12.31 SCREENING MAMMOGRAM, ENCOUNTER FOR: ICD-10-CM

## 2018-11-05 DIAGNOSIS — I48.20 CHRONIC ATRIAL FIBRILLATION (H): ICD-10-CM

## 2018-11-05 ASSESSMENT — MIFFLIN-ST. JEOR: SCORE: 1018.79

## 2018-12-12 ENCOUNTER — AMBULATORY - HEALTHEAST (OUTPATIENT)
Dept: LAB | Facility: CLINIC | Age: 78
End: 2018-12-12

## 2018-12-12 ENCOUNTER — COMMUNICATION - HEALTHEAST (OUTPATIENT)
Dept: ANTICOAGULATION | Facility: CLINIC | Age: 78
End: 2018-12-12

## 2018-12-12 DIAGNOSIS — I48.20 CHRONIC ATRIAL FIBRILLATION (H): ICD-10-CM

## 2018-12-12 DIAGNOSIS — I48.20 CHRONIC A-FIB (H): ICD-10-CM

## 2018-12-12 LAB — INR PPP: 3.1 (ref 0.9–1.1)

## 2018-12-28 ENCOUNTER — COMMUNICATION - HEALTHEAST (OUTPATIENT)
Dept: ANTICOAGULATION | Facility: CLINIC | Age: 78
End: 2018-12-28

## 2018-12-28 ENCOUNTER — AMBULATORY - HEALTHEAST (OUTPATIENT)
Dept: LAB | Facility: CLINIC | Age: 78
End: 2018-12-28

## 2018-12-28 DIAGNOSIS — I48.20 CHRONIC A-FIB (H): ICD-10-CM

## 2018-12-28 DIAGNOSIS — I48.20 CHRONIC ATRIAL FIBRILLATION (H): ICD-10-CM

## 2018-12-28 LAB — INR PPP: 3.1 (ref 0.9–1.1)

## 2019-01-11 ENCOUNTER — AMBULATORY - HEALTHEAST (OUTPATIENT)
Dept: LAB | Facility: CLINIC | Age: 79
End: 2019-01-11

## 2019-01-11 ENCOUNTER — COMMUNICATION - HEALTHEAST (OUTPATIENT)
Dept: ANTICOAGULATION | Facility: CLINIC | Age: 79
End: 2019-01-11

## 2019-01-11 DIAGNOSIS — I48.20 CHRONIC A-FIB (H): ICD-10-CM

## 2019-01-11 DIAGNOSIS — I48.20 CHRONIC ATRIAL FIBRILLATION (H): ICD-10-CM

## 2019-01-11 LAB — INR PPP: 1.9 (ref 0.9–1.1)

## 2019-01-15 ENCOUNTER — COMMUNICATION - HEALTHEAST (OUTPATIENT)
Dept: FAMILY MEDICINE | Facility: CLINIC | Age: 79
End: 2019-01-15

## 2019-01-15 DIAGNOSIS — I10 ESSENTIAL HYPERTENSION: ICD-10-CM

## 2019-02-01 ENCOUNTER — COMMUNICATION - HEALTHEAST (OUTPATIENT)
Dept: ANTICOAGULATION | Facility: CLINIC | Age: 79
End: 2019-02-01

## 2019-02-01 ENCOUNTER — OFFICE VISIT - HEALTHEAST (OUTPATIENT)
Dept: FAMILY MEDICINE | Facility: CLINIC | Age: 79
End: 2019-02-01

## 2019-02-01 ENCOUNTER — RECORDS - HEALTHEAST (OUTPATIENT)
Dept: ADMINISTRATIVE | Facility: OTHER | Age: 79
End: 2019-02-01

## 2019-02-01 DIAGNOSIS — M89.9 DISORDER OF BONE AND CARTILAGE: ICD-10-CM

## 2019-02-01 DIAGNOSIS — I48.20 CHRONIC ATRIAL FIBRILLATION (H): ICD-10-CM

## 2019-02-01 DIAGNOSIS — I10 ESSENTIAL HYPERTENSION: ICD-10-CM

## 2019-02-01 DIAGNOSIS — G93.89 ENCEPHALOMALACIA ON IMAGING STUDY: ICD-10-CM

## 2019-02-01 DIAGNOSIS — M94.9 DISORDER OF BONE AND CARTILAGE: ICD-10-CM

## 2019-02-01 DIAGNOSIS — G40.209 PARTIAL SYMPTOMATIC EPILEPSY WITH COMPLEX PARTIAL SEIZURES, NOT INTRACTABLE, WITHOUT STATUS EPILEPTICUS (H): ICD-10-CM

## 2019-02-01 DIAGNOSIS — I48.20 CHRONIC A-FIB (H): ICD-10-CM

## 2019-02-01 LAB
ANION GAP SERPL CALCULATED.3IONS-SCNC: 10 MMOL/L (ref 5–18)
BUN SERPL-MCNC: 22 MG/DL (ref 8–28)
CALCIUM SERPL-MCNC: 9.6 MG/DL (ref 8.5–10.5)
CHLORIDE BLD-SCNC: 102 MMOL/L (ref 98–107)
CO2 SERPL-SCNC: 28 MMOL/L (ref 22–31)
CREAT SERPL-MCNC: 0.9 MG/DL (ref 0.6–1.1)
ERYTHROCYTE [DISTWIDTH] IN BLOOD BY AUTOMATED COUNT: 12.5 % (ref 11–14.5)
GFR SERPL CREATININE-BSD FRML MDRD: 60 ML/MIN/1.73M2
GLUCOSE BLD-MCNC: 102 MG/DL (ref 70–125)
HCT VFR BLD AUTO: 37.9 % (ref 35–47)
HGB BLD-MCNC: 12.7 G/DL (ref 12–16)
INR PPP: 2.5 (ref 0.9–1.1)
MCH RBC QN AUTO: 30 PG (ref 27–34)
MCHC RBC AUTO-ENTMCNC: 33.5 G/DL (ref 32–36)
MCV RBC AUTO: 89 FL (ref 80–100)
PLATELET # BLD AUTO: 174 THOU/UL (ref 140–440)
PMV BLD AUTO: 8.5 FL (ref 7–10)
POTASSIUM BLD-SCNC: 3.4 MMOL/L (ref 3.5–5)
RBC # BLD AUTO: 4.23 MILL/UL (ref 3.8–5.4)
SODIUM SERPL-SCNC: 140 MMOL/L (ref 136–145)
WBC: 5.9 THOU/UL (ref 4–11)

## 2019-02-02 ENCOUNTER — COMMUNICATION - HEALTHEAST (OUTPATIENT)
Dept: FAMILY MEDICINE | Facility: CLINIC | Age: 79
End: 2019-02-02

## 2019-02-14 ENCOUNTER — RECORDS - HEALTHEAST (OUTPATIENT)
Dept: ADMINISTRATIVE | Facility: OTHER | Age: 79
End: 2019-02-14

## 2019-02-19 ENCOUNTER — COMMUNICATION - HEALTHEAST (OUTPATIENT)
Dept: ANTICOAGULATION | Facility: CLINIC | Age: 79
End: 2019-02-19

## 2019-02-19 ENCOUNTER — AMBULATORY - HEALTHEAST (OUTPATIENT)
Dept: LAB | Facility: CLINIC | Age: 79
End: 2019-02-19

## 2019-02-19 DIAGNOSIS — I48.20 CHRONIC ATRIAL FIBRILLATION (H): ICD-10-CM

## 2019-02-19 LAB — INR PPP: 2.1 (ref 0.9–1.1)

## 2019-02-26 ENCOUNTER — COMMUNICATION - HEALTHEAST (OUTPATIENT)
Dept: FAMILY MEDICINE | Facility: CLINIC | Age: 79
End: 2019-02-26

## 2019-02-26 DIAGNOSIS — E78.00 PURE HYPERCHOLESTEROLEMIA: ICD-10-CM

## 2019-02-26 DIAGNOSIS — K21.9 GASTROESOPHAGEAL REFLUX DISEASE WITHOUT ESOPHAGITIS: ICD-10-CM

## 2019-02-26 DIAGNOSIS — I10 ESSENTIAL HYPERTENSION: ICD-10-CM

## 2019-03-19 ENCOUNTER — AMBULATORY - HEALTHEAST (OUTPATIENT)
Dept: LAB | Facility: CLINIC | Age: 79
End: 2019-03-19

## 2019-03-19 ENCOUNTER — COMMUNICATION - HEALTHEAST (OUTPATIENT)
Dept: ANTICOAGULATION | Facility: CLINIC | Age: 79
End: 2019-03-19

## 2019-03-19 DIAGNOSIS — I48.20 CHRONIC ATRIAL FIBRILLATION (H): ICD-10-CM

## 2019-03-19 LAB — INR PPP: 2.4 (ref 0.9–1.1)

## 2019-04-12 ENCOUNTER — COMMUNICATION - HEALTHEAST (OUTPATIENT)
Dept: ANTICOAGULATION | Facility: CLINIC | Age: 79
End: 2019-04-12

## 2019-04-12 DIAGNOSIS — I48.20 CHRONIC A-FIB (H): ICD-10-CM

## 2019-04-16 ENCOUNTER — AMBULATORY - HEALTHEAST (OUTPATIENT)
Dept: LAB | Facility: CLINIC | Age: 79
End: 2019-04-16

## 2019-04-16 ENCOUNTER — COMMUNICATION - HEALTHEAST (OUTPATIENT)
Dept: ANTICOAGULATION | Facility: CLINIC | Age: 79
End: 2019-04-16

## 2019-04-16 DIAGNOSIS — I48.20 CHRONIC A-FIB (H): ICD-10-CM

## 2019-04-16 LAB — INR PPP: 2.5 (ref 0.9–1.1)

## 2019-04-19 ENCOUNTER — OFFICE VISIT - HEALTHEAST (OUTPATIENT)
Dept: FAMILY MEDICINE | Facility: CLINIC | Age: 79
End: 2019-04-19

## 2019-04-19 DIAGNOSIS — G93.89 ENCEPHALOMALACIA ON IMAGING STUDY: ICD-10-CM

## 2019-04-19 DIAGNOSIS — I10 ESSENTIAL HYPERTENSION: ICD-10-CM

## 2019-04-19 DIAGNOSIS — M94.9 DISORDER OF BONE AND CARTILAGE: ICD-10-CM

## 2019-04-19 DIAGNOSIS — E78.00 PURE HYPERCHOLESTEROLEMIA: ICD-10-CM

## 2019-04-19 DIAGNOSIS — G40.109 LOCALIZATION-RELATED FOCAL EPILEPSY WITH SIMPLE PARTIAL SEIZURES (H): ICD-10-CM

## 2019-04-19 DIAGNOSIS — I48.20 CHRONIC ATRIAL FIBRILLATION (H): ICD-10-CM

## 2019-04-19 DIAGNOSIS — M89.9 DISORDER OF BONE AND CARTILAGE: ICD-10-CM

## 2019-04-19 LAB
ALBUMIN SERPL-MCNC: 3.9 G/DL (ref 3.5–5)
ALP SERPL-CCNC: 62 U/L (ref 45–120)
ALT SERPL W P-5'-P-CCNC: 24 U/L (ref 0–45)
ANION GAP SERPL CALCULATED.3IONS-SCNC: 12 MMOL/L (ref 5–18)
AST SERPL W P-5'-P-CCNC: 25 U/L (ref 0–40)
BILIRUB SERPL-MCNC: 0.4 MG/DL (ref 0–1)
BUN SERPL-MCNC: 23 MG/DL (ref 8–28)
CALCIUM SERPL-MCNC: 9.9 MG/DL (ref 8.5–10.5)
CHLORIDE BLD-SCNC: 106 MMOL/L (ref 98–107)
CHOLEST SERPL-MCNC: 167 MG/DL
CO2 SERPL-SCNC: 26 MMOL/L (ref 22–31)
CREAT SERPL-MCNC: 0.88 MG/DL (ref 0.6–1.1)
ERYTHROCYTE [DISTWIDTH] IN BLOOD BY AUTOMATED COUNT: 12.4 % (ref 11–14.5)
FASTING STATUS PATIENT QL REPORTED: NORMAL
GFR SERPL CREATININE-BSD FRML MDRD: >60 ML/MIN/1.73M2
GLUCOSE BLD-MCNC: 101 MG/DL (ref 70–125)
HCT VFR BLD AUTO: 39 % (ref 35–47)
HDLC SERPL-MCNC: 57 MG/DL
HGB BLD-MCNC: 12.8 G/DL (ref 12–16)
LDLC SERPL CALC-MCNC: 89 MG/DL
LEVETIRACETAM (KEPPRA): 50.4 UG/ML (ref 6–46)
MCH RBC QN AUTO: 30.2 PG (ref 27–34)
MCHC RBC AUTO-ENTMCNC: 32.9 G/DL (ref 32–36)
MCV RBC AUTO: 92 FL (ref 80–100)
PLATELET # BLD AUTO: 160 THOU/UL (ref 140–440)
PMV BLD AUTO: 7.9 FL (ref 7–10)
POTASSIUM BLD-SCNC: 3.9 MMOL/L (ref 3.5–5)
PROT SERPL-MCNC: 6.8 G/DL (ref 6–8)
RBC # BLD AUTO: 4.25 MILL/UL (ref 3.8–5.4)
SODIUM SERPL-SCNC: 144 MMOL/L (ref 136–145)
TRIGL SERPL-MCNC: 103 MG/DL
WBC: 5.3 THOU/UL (ref 4–11)

## 2019-04-22 ENCOUNTER — COMMUNICATION - HEALTHEAST (OUTPATIENT)
Dept: FAMILY MEDICINE | Facility: CLINIC | Age: 79
End: 2019-04-22

## 2019-04-22 LAB — 25(OH)D3 SERPL-MCNC: 53 NG/ML (ref 30–80)

## 2019-04-29 ENCOUNTER — COMMUNICATION - HEALTHEAST (OUTPATIENT)
Dept: FAMILY MEDICINE | Facility: CLINIC | Age: 79
End: 2019-04-29

## 2019-05-08 ENCOUNTER — OFFICE VISIT - HEALTHEAST (OUTPATIENT)
Dept: ONCOLOGY | Facility: CLINIC | Age: 79
End: 2019-05-08

## 2019-05-08 DIAGNOSIS — C50.512 MALIGNANT NEOPLASM OF LOWER-OUTER QUADRANT OF LEFT BREAST OF FEMALE, ESTROGEN RECEPTOR NEGATIVE (H): ICD-10-CM

## 2019-05-08 DIAGNOSIS — Z85.3 HISTORY OF LEFT BREAST CANCER: ICD-10-CM

## 2019-05-08 DIAGNOSIS — Z12.31 ENCOUNTER FOR SCREENING MAMMOGRAM FOR MALIGNANT NEOPLASM OF BREAST: ICD-10-CM

## 2019-05-08 DIAGNOSIS — Z17.1 MALIGNANT NEOPLASM OF LOWER-OUTER QUADRANT OF LEFT BREAST OF FEMALE, ESTROGEN RECEPTOR NEGATIVE (H): ICD-10-CM

## 2019-05-14 ENCOUNTER — RECORDS - HEALTHEAST (OUTPATIENT)
Dept: ADMINISTRATIVE | Facility: OTHER | Age: 79
End: 2019-05-14

## 2019-05-22 ENCOUNTER — RECORDS - HEALTHEAST (OUTPATIENT)
Dept: ADMINISTRATIVE | Facility: OTHER | Age: 79
End: 2019-05-22

## 2019-05-28 ENCOUNTER — AMBULATORY - HEALTHEAST (OUTPATIENT)
Dept: LAB | Facility: CLINIC | Age: 79
End: 2019-05-28

## 2019-05-28 ENCOUNTER — COMMUNICATION - HEALTHEAST (OUTPATIENT)
Dept: ANTICOAGULATION | Facility: CLINIC | Age: 79
End: 2019-05-28

## 2019-05-28 DIAGNOSIS — I48.20 CHRONIC A-FIB (H): ICD-10-CM

## 2019-05-28 LAB — INR PPP: 1.9 (ref 0.9–1.1)

## 2019-06-11 ENCOUNTER — COMMUNICATION - HEALTHEAST (OUTPATIENT)
Dept: ANTICOAGULATION | Facility: CLINIC | Age: 79
End: 2019-06-11

## 2019-06-11 ENCOUNTER — AMBULATORY - HEALTHEAST (OUTPATIENT)
Dept: LAB | Facility: CLINIC | Age: 79
End: 2019-06-11

## 2019-06-11 DIAGNOSIS — I48.20 CHRONIC A-FIB (H): ICD-10-CM

## 2019-06-11 LAB — INR PPP: 2.8 (ref 0.9–1.1)

## 2019-06-25 ENCOUNTER — COMMUNICATION - HEALTHEAST (OUTPATIENT)
Dept: ANTICOAGULATION | Facility: CLINIC | Age: 79
End: 2019-06-25

## 2019-06-25 ENCOUNTER — AMBULATORY - HEALTHEAST (OUTPATIENT)
Dept: LAB | Facility: CLINIC | Age: 79
End: 2019-06-25

## 2019-06-25 DIAGNOSIS — I48.20 CHRONIC A-FIB (H): ICD-10-CM

## 2019-06-25 LAB — INR PPP: 1.9 (ref 0.9–1.1)

## 2019-07-09 ENCOUNTER — COMMUNICATION - HEALTHEAST (OUTPATIENT)
Dept: ANTICOAGULATION | Facility: CLINIC | Age: 79
End: 2019-07-09

## 2019-07-09 ENCOUNTER — AMBULATORY - HEALTHEAST (OUTPATIENT)
Dept: LAB | Facility: CLINIC | Age: 79
End: 2019-07-09

## 2019-07-09 DIAGNOSIS — I48.20 CHRONIC A-FIB (H): ICD-10-CM

## 2019-07-09 LAB — INR PPP: 2.3 (ref 0.9–1.1)

## 2019-07-24 ENCOUNTER — AMBULATORY - HEALTHEAST (OUTPATIENT)
Dept: LAB | Facility: CLINIC | Age: 79
End: 2019-07-24

## 2019-07-24 ENCOUNTER — COMMUNICATION - HEALTHEAST (OUTPATIENT)
Dept: ANTICOAGULATION | Facility: CLINIC | Age: 79
End: 2019-07-24

## 2019-07-24 DIAGNOSIS — I48.20 CHRONIC A-FIB (H): ICD-10-CM

## 2019-07-24 LAB — INR PPP: 2.2 (ref 0.9–1.1)

## 2019-08-21 ENCOUNTER — COMMUNICATION - HEALTHEAST (OUTPATIENT)
Dept: ANTICOAGULATION | Facility: CLINIC | Age: 79
End: 2019-08-21

## 2019-08-21 ENCOUNTER — AMBULATORY - HEALTHEAST (OUTPATIENT)
Dept: LAB | Facility: CLINIC | Age: 79
End: 2019-08-21

## 2019-08-21 DIAGNOSIS — I48.20 CHRONIC A-FIB (H): ICD-10-CM

## 2019-08-21 LAB — INR PPP: 2.8 (ref 0.9–1.1)

## 2019-09-09 ENCOUNTER — OFFICE VISIT - HEALTHEAST (OUTPATIENT)
Dept: FAMILY MEDICINE | Facility: CLINIC | Age: 79
End: 2019-09-09

## 2019-09-09 DIAGNOSIS — M94.9 DISORDER OF BONE AND CARTILAGE: ICD-10-CM

## 2019-09-09 DIAGNOSIS — I48.20 CHRONIC ATRIAL FIBRILLATION (H): ICD-10-CM

## 2019-09-09 DIAGNOSIS — Z01.818 PREOP GENERAL PHYSICAL EXAM: ICD-10-CM

## 2019-09-09 DIAGNOSIS — M89.9 DISORDER OF BONE AND CARTILAGE: ICD-10-CM

## 2019-09-09 DIAGNOSIS — E78.00 PURE HYPERCHOLESTEROLEMIA: ICD-10-CM

## 2019-09-09 DIAGNOSIS — I10 ESSENTIAL HYPERTENSION: ICD-10-CM

## 2019-09-09 DIAGNOSIS — G40.109 LOCALIZATION-RELATED FOCAL EPILEPSY WITH SIMPLE PARTIAL SEIZURES (H): ICD-10-CM

## 2019-09-09 DIAGNOSIS — H25.9 AGE-RELATED CATARACT OF BOTH EYES, UNSPECIFIED AGE-RELATED CATARACT TYPE: ICD-10-CM

## 2019-09-09 ASSESSMENT — MIFFLIN-ST. JEOR: SCORE: 966.4

## 2019-09-16 ENCOUNTER — AMBULATORY - HEALTHEAST (OUTPATIENT)
Dept: LAB | Facility: CLINIC | Age: 79
End: 2019-09-16

## 2019-09-16 ENCOUNTER — COMMUNICATION - HEALTHEAST (OUTPATIENT)
Dept: ANTICOAGULATION | Facility: CLINIC | Age: 79
End: 2019-09-16

## 2019-09-16 DIAGNOSIS — I48.20 CHRONIC A-FIB (H): ICD-10-CM

## 2019-09-16 LAB — INR PPP: 2.7 (ref 0.9–1.1)

## 2019-10-04 ENCOUNTER — COMMUNICATION - HEALTHEAST (OUTPATIENT)
Dept: FAMILY MEDICINE | Facility: CLINIC | Age: 79
End: 2019-10-04

## 2019-10-04 DIAGNOSIS — I10 ESSENTIAL HYPERTENSION: ICD-10-CM

## 2019-10-13 ENCOUNTER — COMMUNICATION - HEALTHEAST (OUTPATIENT)
Dept: FAMILY MEDICINE | Facility: CLINIC | Age: 79
End: 2019-10-13

## 2019-10-13 DIAGNOSIS — I48.20 CHRONIC ATRIAL FIBRILLATION (H): ICD-10-CM

## 2019-10-29 ENCOUNTER — AMBULATORY - HEALTHEAST (OUTPATIENT)
Dept: LAB | Facility: CLINIC | Age: 79
End: 2019-10-29

## 2019-10-29 ENCOUNTER — COMMUNICATION - HEALTHEAST (OUTPATIENT)
Dept: ANTICOAGULATION | Facility: CLINIC | Age: 79
End: 2019-10-29

## 2019-10-29 ENCOUNTER — COMMUNICATION - HEALTHEAST (OUTPATIENT)
Dept: FAMILY MEDICINE | Facility: CLINIC | Age: 79
End: 2019-10-29

## 2019-10-29 DIAGNOSIS — I48.20 CHRONIC A-FIB (H): ICD-10-CM

## 2019-10-29 LAB — INR PPP: 3.5 (ref 0.9–1.1)

## 2019-11-07 ENCOUNTER — RECORDS - HEALTHEAST (OUTPATIENT)
Dept: ADMINISTRATIVE | Facility: OTHER | Age: 79
End: 2019-11-07

## 2019-11-07 ENCOUNTER — RECORDS - HEALTHEAST (OUTPATIENT)
Dept: BONE DENSITY | Facility: CLINIC | Age: 79
End: 2019-11-07

## 2019-11-07 ENCOUNTER — COMMUNICATION - HEALTHEAST (OUTPATIENT)
Dept: TELEHEALTH | Facility: CLINIC | Age: 79
End: 2019-11-07

## 2019-11-07 ENCOUNTER — HOSPITAL ENCOUNTER (OUTPATIENT)
Dept: MAMMOGRAPHY | Facility: CLINIC | Age: 79
Setting detail: RADIATION/ONCOLOGY SERIES
Discharge: STILL ADMITTED OR ADMINISTRATIVE | End: 2019-11-07
Attending: INTERNAL MEDICINE

## 2019-11-07 DIAGNOSIS — C50.512 MALIGNANT NEOPLASM OF LOWER-OUTER QUADRANT OF LEFT BREAST OF FEMALE, ESTROGEN RECEPTOR NEGATIVE (H): ICD-10-CM

## 2019-11-07 DIAGNOSIS — M94.9 DISORDER OF CARTILAGE, UNSPECIFIED: ICD-10-CM

## 2019-11-07 DIAGNOSIS — M89.9 DISORDER OF BONE, UNSPECIFIED: ICD-10-CM

## 2019-11-07 DIAGNOSIS — Z12.31 ENCOUNTER FOR SCREENING MAMMOGRAM FOR MALIGNANT NEOPLASM OF BREAST: ICD-10-CM

## 2019-11-07 DIAGNOSIS — Z17.1 MALIGNANT NEOPLASM OF LOWER-OUTER QUADRANT OF LEFT BREAST OF FEMALE, ESTROGEN RECEPTOR NEGATIVE (H): ICD-10-CM

## 2019-11-13 ENCOUNTER — AMBULATORY - HEALTHEAST (OUTPATIENT)
Dept: LAB | Facility: CLINIC | Age: 79
End: 2019-11-13

## 2019-11-13 ENCOUNTER — COMMUNICATION - HEALTHEAST (OUTPATIENT)
Dept: ANTICOAGULATION | Facility: CLINIC | Age: 79
End: 2019-11-13

## 2019-11-13 DIAGNOSIS — I48.20 CHRONIC A-FIB (H): ICD-10-CM

## 2019-11-13 LAB — INR PPP: 2.7 (ref 0.9–1.1)

## 2019-11-19 ENCOUNTER — HOSPITAL ENCOUNTER (OUTPATIENT)
Dept: MAMMOGRAPHY | Facility: CLINIC | Age: 79
Discharge: HOME | End: 2019-11-19
Attending: FAMILY MEDICINE

## 2019-11-19 DIAGNOSIS — R92.0 MICROCALCIFICATIONS OF THE BREAST: ICD-10-CM

## 2019-11-26 ENCOUNTER — AMBULATORY - HEALTHEAST (OUTPATIENT)
Dept: LAB | Facility: CLINIC | Age: 79
End: 2019-11-26

## 2019-11-26 ENCOUNTER — COMMUNICATION - HEALTHEAST (OUTPATIENT)
Dept: ANTICOAGULATION | Facility: CLINIC | Age: 79
End: 2019-11-26

## 2019-11-26 DIAGNOSIS — I48.20 CHRONIC A-FIB (H): ICD-10-CM

## 2019-11-26 LAB — INR PPP: 2.8 (ref 0.9–1.1)

## 2019-12-20 ENCOUNTER — COMMUNICATION - HEALTHEAST (OUTPATIENT)
Dept: ANTICOAGULATION | Facility: CLINIC | Age: 79
End: 2019-12-20

## 2019-12-20 ENCOUNTER — OFFICE VISIT - HEALTHEAST (OUTPATIENT)
Dept: FAMILY MEDICINE | Facility: CLINIC | Age: 79
End: 2019-12-20

## 2019-12-20 DIAGNOSIS — I48.20 CHRONIC ATRIAL FIBRILLATION (H): ICD-10-CM

## 2019-12-20 DIAGNOSIS — E78.00 PURE HYPERCHOLESTEROLEMIA: ICD-10-CM

## 2019-12-20 DIAGNOSIS — R56.9 CONVULSIONS, UNSPECIFIED CONVULSION TYPE (H): ICD-10-CM

## 2019-12-20 DIAGNOSIS — M94.9 DISORDER OF BONE AND CARTILAGE: ICD-10-CM

## 2019-12-20 DIAGNOSIS — I10 ESSENTIAL HYPERTENSION: ICD-10-CM

## 2019-12-20 DIAGNOSIS — M89.9 DISORDER OF BONE AND CARTILAGE: ICD-10-CM

## 2019-12-20 LAB
ANION GAP SERPL CALCULATED.3IONS-SCNC: 11 MMOL/L (ref 5–18)
BUN SERPL-MCNC: 23 MG/DL (ref 8–28)
CALCIUM SERPL-MCNC: 9.7 MG/DL (ref 8.5–10.5)
CHLORIDE BLD-SCNC: 107 MMOL/L (ref 98–107)
CHOLEST SERPL-MCNC: 153 MG/DL
CO2 SERPL-SCNC: 26 MMOL/L (ref 22–31)
CREAT SERPL-MCNC: 0.97 MG/DL (ref 0.6–1.1)
FASTING STATUS PATIENT QL REPORTED: YES
GFR SERPL CREATININE-BSD FRML MDRD: 55 ML/MIN/1.73M2
GLUCOSE BLD-MCNC: 91 MG/DL (ref 70–125)
HDLC SERPL-MCNC: 49 MG/DL
INR PPP: 2.9 (ref 0.9–1.1)
LDLC SERPL CALC-MCNC: 90 MG/DL
POTASSIUM BLD-SCNC: 4.4 MMOL/L (ref 3.5–5)
SODIUM SERPL-SCNC: 144 MMOL/L (ref 136–145)
TRIGL SERPL-MCNC: 69 MG/DL

## 2019-12-22 ENCOUNTER — COMMUNICATION - HEALTHEAST (OUTPATIENT)
Dept: FAMILY MEDICINE | Facility: CLINIC | Age: 79
End: 2019-12-22

## 2020-01-17 ENCOUNTER — AMBULATORY - HEALTHEAST (OUTPATIENT)
Dept: LAB | Facility: CLINIC | Age: 80
End: 2020-01-17

## 2020-01-17 ENCOUNTER — COMMUNICATION - HEALTHEAST (OUTPATIENT)
Dept: ANTICOAGULATION | Facility: CLINIC | Age: 80
End: 2020-01-17

## 2020-01-17 DIAGNOSIS — I48.20 CHRONIC ATRIAL FIBRILLATION (H): ICD-10-CM

## 2020-01-17 LAB — INR PPP: 3.3 (ref 0.9–1.1)

## 2020-01-31 ENCOUNTER — COMMUNICATION - HEALTHEAST (OUTPATIENT)
Dept: ANTICOAGULATION | Facility: CLINIC | Age: 80
End: 2020-01-31

## 2020-01-31 ENCOUNTER — AMBULATORY - HEALTHEAST (OUTPATIENT)
Dept: LAB | Facility: CLINIC | Age: 80
End: 2020-01-31

## 2020-01-31 DIAGNOSIS — I48.20 CHRONIC ATRIAL FIBRILLATION (H): ICD-10-CM

## 2020-01-31 LAB — INR PPP: 3 (ref 0.9–1.1)

## 2020-02-09 ENCOUNTER — COMMUNICATION - HEALTHEAST (OUTPATIENT)
Dept: FAMILY MEDICINE | Facility: CLINIC | Age: 80
End: 2020-02-09

## 2020-02-09 DIAGNOSIS — K21.9 GASTROESOPHAGEAL REFLUX DISEASE WITHOUT ESOPHAGITIS: ICD-10-CM

## 2020-02-09 DIAGNOSIS — I10 ESSENTIAL HYPERTENSION: ICD-10-CM

## 2020-02-09 DIAGNOSIS — E78.00 PURE HYPERCHOLESTEROLEMIA: ICD-10-CM

## 2020-02-13 ENCOUNTER — COMMUNICATION - HEALTHEAST (OUTPATIENT)
Dept: ANTICOAGULATION | Facility: CLINIC | Age: 80
End: 2020-02-13

## 2020-02-13 ENCOUNTER — AMBULATORY - HEALTHEAST (OUTPATIENT)
Dept: LAB | Facility: CLINIC | Age: 80
End: 2020-02-13

## 2020-02-13 DIAGNOSIS — I48.20 CHRONIC ATRIAL FIBRILLATION (H): ICD-10-CM

## 2020-02-13 LAB — INR PPP: 2.7 (ref 0.9–1.1)

## 2020-03-12 ENCOUNTER — COMMUNICATION - HEALTHEAST (OUTPATIENT)
Dept: ANTICOAGULATION | Facility: CLINIC | Age: 80
End: 2020-03-12

## 2020-03-12 ENCOUNTER — AMBULATORY - HEALTHEAST (OUTPATIENT)
Dept: LAB | Facility: CLINIC | Age: 80
End: 2020-03-12

## 2020-03-12 DIAGNOSIS — I48.20 CHRONIC ATRIAL FIBRILLATION (H): ICD-10-CM

## 2020-03-12 LAB — INR PPP: 3.2 (ref 0.9–1.1)

## 2020-03-19 ENCOUNTER — COMMUNICATION - HEALTHEAST (OUTPATIENT)
Dept: ADMINISTRATIVE | Facility: HOSPITAL | Age: 80
End: 2020-03-19

## 2020-03-20 ENCOUNTER — COMMUNICATION - HEALTHEAST (OUTPATIENT)
Dept: ANTICOAGULATION | Facility: CLINIC | Age: 80
End: 2020-03-20

## 2020-03-20 DIAGNOSIS — I48.20 CHRONIC ATRIAL FIBRILLATION (H): ICD-10-CM

## 2020-03-26 ENCOUNTER — AMBULATORY - HEALTHEAST (OUTPATIENT)
Dept: LAB | Facility: CLINIC | Age: 80
End: 2020-03-26

## 2020-03-26 ENCOUNTER — COMMUNICATION - HEALTHEAST (OUTPATIENT)
Dept: ANTICOAGULATION | Facility: CLINIC | Age: 80
End: 2020-03-26

## 2020-03-26 DIAGNOSIS — I48.20 CHRONIC ATRIAL FIBRILLATION (H): ICD-10-CM

## 2020-03-26 LAB — INR PPP: 3.2 (ref 0.9–1.1)

## 2020-04-04 ENCOUNTER — COMMUNICATION - HEALTHEAST (OUTPATIENT)
Dept: FAMILY MEDICINE | Facility: CLINIC | Age: 80
End: 2020-04-04

## 2020-04-04 DIAGNOSIS — I48.20 CHRONIC ATRIAL FIBRILLATION (H): ICD-10-CM

## 2020-04-09 ENCOUNTER — COMMUNICATION - HEALTHEAST (OUTPATIENT)
Dept: ANTICOAGULATION | Facility: CLINIC | Age: 80
End: 2020-04-09

## 2020-04-09 ENCOUNTER — AMBULATORY - HEALTHEAST (OUTPATIENT)
Dept: LAB | Facility: CLINIC | Age: 80
End: 2020-04-09

## 2020-04-09 DIAGNOSIS — I48.20 CHRONIC ATRIAL FIBRILLATION (H): ICD-10-CM

## 2020-04-09 LAB — INR PPP: 1.9 (ref 0.9–1.1)

## 2020-04-16 ENCOUNTER — AMBULATORY - HEALTHEAST (OUTPATIENT)
Dept: LAB | Facility: CLINIC | Age: 80
End: 2020-04-16

## 2020-04-16 DIAGNOSIS — R56.9 SEIZURES (H): ICD-10-CM

## 2020-04-16 LAB
ANION GAP SERPL CALCULATED.3IONS-SCNC: 11 MMOL/L (ref 5–18)
BUN SERPL-MCNC: 18 MG/DL (ref 8–28)
CALCIUM SERPL-MCNC: 9.4 MG/DL (ref 8.5–10.5)
CHLORIDE BLD-SCNC: 106 MMOL/L (ref 98–107)
CO2 SERPL-SCNC: 27 MMOL/L (ref 22–31)
CREAT SERPL-MCNC: 0.93 MG/DL (ref 0.6–1.1)
GFR SERPL CREATININE-BSD FRML MDRD: 58 ML/MIN/1.73M2
GLUCOSE BLD-MCNC: 84 MG/DL (ref 70–125)
LEVETIRACETAM (KEPPRA): 65.5 UG/ML (ref 6–46)
POTASSIUM BLD-SCNC: 3.7 MMOL/L (ref 3.5–5)
SODIUM SERPL-SCNC: 144 MMOL/L (ref 136–145)

## 2020-04-23 ENCOUNTER — AMBULATORY - HEALTHEAST (OUTPATIENT)
Dept: LAB | Facility: CLINIC | Age: 80
End: 2020-04-23

## 2020-04-23 ENCOUNTER — COMMUNICATION - HEALTHEAST (OUTPATIENT)
Dept: ANTICOAGULATION | Facility: CLINIC | Age: 80
End: 2020-04-23

## 2020-04-23 DIAGNOSIS — I48.20 CHRONIC ATRIAL FIBRILLATION (H): ICD-10-CM

## 2020-04-23 LAB — INR PPP: 2 (ref 0.9–1.1)

## 2020-05-07 ENCOUNTER — COMMUNICATION - HEALTHEAST (OUTPATIENT)
Dept: ANTICOAGULATION | Facility: CLINIC | Age: 80
End: 2020-05-07

## 2020-05-07 ENCOUNTER — AMBULATORY - HEALTHEAST (OUTPATIENT)
Dept: LAB | Facility: CLINIC | Age: 80
End: 2020-05-07

## 2020-05-07 DIAGNOSIS — I48.20 CHRONIC ATRIAL FIBRILLATION (H): ICD-10-CM

## 2020-05-07 LAB — INR PPP: 2.2 (ref 0.9–1.1)

## 2020-06-04 ENCOUNTER — COMMUNICATION - HEALTHEAST (OUTPATIENT)
Dept: ANTICOAGULATION | Facility: CLINIC | Age: 80
End: 2020-06-04

## 2020-06-04 ENCOUNTER — AMBULATORY - HEALTHEAST (OUTPATIENT)
Dept: LAB | Facility: CLINIC | Age: 80
End: 2020-06-04

## 2020-06-04 DIAGNOSIS — I48.20 CHRONIC ATRIAL FIBRILLATION (H): ICD-10-CM

## 2020-06-04 LAB — INR PPP: 2.2 (ref 0.9–1.1)

## 2020-06-20 ENCOUNTER — COMMUNICATION - HEALTHEAST (OUTPATIENT)
Dept: FAMILY MEDICINE | Facility: CLINIC | Age: 80
End: 2020-06-20

## 2020-06-20 DIAGNOSIS — I10 ESSENTIAL HYPERTENSION: ICD-10-CM

## 2020-06-24 ENCOUNTER — COMMUNICATION - HEALTHEAST (OUTPATIENT)
Dept: FAMILY MEDICINE | Facility: CLINIC | Age: 80
End: 2020-06-24

## 2020-06-24 DIAGNOSIS — K21.9 GASTROESOPHAGEAL REFLUX DISEASE WITHOUT ESOPHAGITIS: ICD-10-CM

## 2020-06-24 DIAGNOSIS — I10 ESSENTIAL HYPERTENSION: ICD-10-CM

## 2020-06-24 DIAGNOSIS — E78.00 PURE HYPERCHOLESTEROLEMIA: ICD-10-CM

## 2020-06-30 ENCOUNTER — COMMUNICATION - HEALTHEAST (OUTPATIENT)
Dept: LAB | Facility: CLINIC | Age: 80
End: 2020-06-30

## 2020-06-30 ENCOUNTER — AMBULATORY - HEALTHEAST (OUTPATIENT)
Dept: LAB | Facility: CLINIC | Age: 80
End: 2020-06-30

## 2020-06-30 DIAGNOSIS — Z79.899 ENCOUNTER FOR LONG-TERM (CURRENT) USE OF MEDICATIONS: ICD-10-CM

## 2020-06-30 DIAGNOSIS — E78.00 PURE HYPERCHOLESTEROLEMIA: ICD-10-CM

## 2020-06-30 DIAGNOSIS — I48.20 CHRONIC ATRIAL FIBRILLATION (H): ICD-10-CM

## 2020-06-30 DIAGNOSIS — I10 ESSENTIAL HYPERTENSION: ICD-10-CM

## 2020-06-30 DIAGNOSIS — K21.9 GASTROESOPHAGEAL REFLUX DISEASE WITHOUT ESOPHAGITIS: ICD-10-CM

## 2020-06-30 LAB
ALBUMIN SERPL-MCNC: 3.2 G/DL (ref 3.5–5)
ALP SERPL-CCNC: 767 U/L (ref 45–120)
ALT SERPL W P-5'-P-CCNC: 179 U/L (ref 0–45)
ANION GAP SERPL CALCULATED.3IONS-SCNC: 17 MMOL/L (ref 5–18)
AST SERPL W P-5'-P-CCNC: 180 U/L (ref 0–40)
BILIRUB DIRECT SERPL-MCNC: 3.8 MG/DL
BILIRUB SERPL-MCNC: 5.8 MG/DL (ref 0–1)
BUN SERPL-MCNC: 18 MG/DL (ref 8–28)
CALCIUM SERPL-MCNC: 9.6 MG/DL (ref 8.5–10.5)
CHLORIDE BLD-SCNC: 102 MMOL/L (ref 98–107)
CHOLEST SERPL-MCNC: 129 MG/DL
CO2 SERPL-SCNC: 21 MMOL/L (ref 22–31)
CREAT SERPL-MCNC: 0.83 MG/DL (ref 0.6–1.1)
FASTING STATUS PATIENT QL REPORTED: YES
GFR SERPL CREATININE-BSD FRML MDRD: >60 ML/MIN/1.73M2
GLUCOSE BLD-MCNC: 108 MG/DL (ref 70–125)
HDLC SERPL-MCNC: 43 MG/DL
HGB BLD-MCNC: 13.7 G/DL (ref 12–16)
INR PPP: 3.74 (ref 0.9–1.1)
LDLC SERPL CALC-MCNC: 74 MG/DL
LEVETIRACETAM (KEPPRA): 96.4 UG/ML (ref 6–46)
POTASSIUM BLD-SCNC: 3.9 MMOL/L (ref 3.5–5)
PROT SERPL-MCNC: 6.9 G/DL (ref 6–8)
SODIUM SERPL-SCNC: 140 MMOL/L (ref 136–145)
TRIGL SERPL-MCNC: 62 MG/DL

## 2020-07-02 ENCOUNTER — OFFICE VISIT - HEALTHEAST (OUTPATIENT)
Dept: FAMILY MEDICINE | Facility: CLINIC | Age: 80
End: 2020-07-02

## 2020-07-02 ENCOUNTER — COMMUNICATION - HEALTHEAST (OUTPATIENT)
Dept: FAMILY MEDICINE | Facility: CLINIC | Age: 80
End: 2020-07-02

## 2020-07-02 DIAGNOSIS — I10 ESSENTIAL HYPERTENSION: ICD-10-CM

## 2020-07-02 DIAGNOSIS — Z85.3 PERSONAL HISTORY OF MALIGNANT NEOPLASM OF BREAST: ICD-10-CM

## 2020-07-02 DIAGNOSIS — I48.20 CHRONIC ATRIAL FIBRILLATION (H): ICD-10-CM

## 2020-07-02 DIAGNOSIS — G40.109 LOCALIZATION-RELATED FOCAL EPILEPSY WITH SIMPLE PARTIAL SEIZURES (H): ICD-10-CM

## 2020-07-02 DIAGNOSIS — R74.8 ELEVATED LIVER ENZYMES: ICD-10-CM

## 2020-07-02 RX ORDER — LEVETIRACETAM 500 MG/1
1250 TABLET ORAL 2 TIMES DAILY
Status: SHIPPED | COMMUNITY
Start: 2020-04-08

## 2020-07-02 ASSESSMENT — MIFFLIN-ST. JEOR: SCORE: 924.5

## 2020-07-06 ENCOUNTER — COMMUNICATION - HEALTHEAST (OUTPATIENT)
Dept: FAMILY MEDICINE | Facility: CLINIC | Age: 80
End: 2020-07-06

## 2020-07-09 ENCOUNTER — COMMUNICATION - HEALTHEAST (OUTPATIENT)
Dept: FAMILY MEDICINE | Facility: CLINIC | Age: 80
End: 2020-07-09

## 2020-07-14 ENCOUNTER — COMMUNICATION - HEALTHEAST (OUTPATIENT)
Dept: FAMILY MEDICINE | Facility: CLINIC | Age: 80
End: 2020-07-14

## 2020-07-14 ENCOUNTER — OFFICE VISIT - HEALTHEAST (OUTPATIENT)
Dept: FAMILY MEDICINE | Facility: CLINIC | Age: 80
End: 2020-07-14

## 2020-07-14 ENCOUNTER — COMMUNICATION - HEALTHEAST (OUTPATIENT)
Dept: ANTICOAGULATION | Facility: CLINIC | Age: 80
End: 2020-07-14

## 2020-07-14 DIAGNOSIS — G40.109 LOCALIZATION-RELATED FOCAL EPILEPSY WITH SIMPLE PARTIAL SEIZURES (H): ICD-10-CM

## 2020-07-14 DIAGNOSIS — R74.8 ELEVATED LIVER ENZYMES: ICD-10-CM

## 2020-07-14 DIAGNOSIS — I48.20 CHRONIC ATRIAL FIBRILLATION (H): ICD-10-CM

## 2020-07-14 DIAGNOSIS — Z00.00 MEDICARE ANNUAL WELLNESS VISIT, SUBSEQUENT: ICD-10-CM

## 2020-07-14 DIAGNOSIS — Z00.01 ENCOUNTER FOR GENERAL ADULT MEDICAL EXAMINATION WITH ABNORMAL FINDINGS: ICD-10-CM

## 2020-07-14 DIAGNOSIS — E78.00 PURE HYPERCHOLESTEROLEMIA: ICD-10-CM

## 2020-07-14 DIAGNOSIS — R63.0 POOR APPETITE: ICD-10-CM

## 2020-07-14 DIAGNOSIS — I10 ESSENTIAL HYPERTENSION: ICD-10-CM

## 2020-07-14 LAB
ALBUMIN SERPL-MCNC: 2.9 G/DL (ref 3.5–5)
ALP SERPL-CCNC: 647 U/L (ref 45–120)
ALT SERPL W P-5'-P-CCNC: 94 U/L (ref 0–45)
AST SERPL W P-5'-P-CCNC: 65 U/L (ref 0–40)
BILIRUB DIRECT SERPL-MCNC: 1 MG/DL
BILIRUB SERPL-MCNC: 1.7 MG/DL (ref 0–1)
INR PPP: 4.3 (ref 0.9–1.1)
LEVETIRACETAM (KEPPRA): 51.6 UG/ML (ref 6–46)
PROT SERPL-MCNC: 6.6 G/DL (ref 6–8)

## 2020-07-14 ASSESSMENT — MIFFLIN-ST. JEOR: SCORE: 923.09

## 2020-07-16 ENCOUNTER — OFFICE VISIT - HEALTHEAST (OUTPATIENT)
Dept: ONCOLOGY | Facility: CLINIC | Age: 80
End: 2020-07-16

## 2020-07-16 DIAGNOSIS — C50.512 MALIGNANT NEOPLASM OF LOWER-OUTER QUADRANT OF LEFT BREAST OF FEMALE, ESTROGEN RECEPTOR NEGATIVE (H): ICD-10-CM

## 2020-07-16 DIAGNOSIS — Z17.1 MALIGNANT NEOPLASM OF LOWER-OUTER QUADRANT OF LEFT BREAST OF FEMALE, ESTROGEN RECEPTOR NEGATIVE (H): ICD-10-CM

## 2020-07-16 DIAGNOSIS — M94.9 DISORDER OF BONE AND CARTILAGE: ICD-10-CM

## 2020-07-16 DIAGNOSIS — Z12.31 ENCOUNTER FOR SCREENING MAMMOGRAM FOR MALIGNANT NEOPLASM OF BREAST: ICD-10-CM

## 2020-07-16 DIAGNOSIS — R79.89 ELEVATED LFTS: ICD-10-CM

## 2020-07-16 DIAGNOSIS — M89.9 DISORDER OF BONE AND CARTILAGE: ICD-10-CM

## 2020-07-21 ENCOUNTER — AMBULATORY - HEALTHEAST (OUTPATIENT)
Dept: LAB | Facility: CLINIC | Age: 80
End: 2020-07-21

## 2020-07-21 ENCOUNTER — COMMUNICATION - HEALTHEAST (OUTPATIENT)
Dept: ANTICOAGULATION | Facility: CLINIC | Age: 80
End: 2020-07-21

## 2020-07-21 DIAGNOSIS — I48.20 CHRONIC ATRIAL FIBRILLATION (H): ICD-10-CM

## 2020-07-21 LAB — INR PPP: 4.3 (ref 0.9–1.1)

## 2020-07-28 ENCOUNTER — AMBULATORY - HEALTHEAST (OUTPATIENT)
Dept: LAB | Facility: CLINIC | Age: 80
End: 2020-07-28

## 2020-07-28 ENCOUNTER — COMMUNICATION - HEALTHEAST (OUTPATIENT)
Dept: ANTICOAGULATION | Facility: CLINIC | Age: 80
End: 2020-07-28

## 2020-07-28 DIAGNOSIS — I48.20 CHRONIC ATRIAL FIBRILLATION (H): ICD-10-CM

## 2020-07-28 LAB — INR PPP: 3.8 (ref 0.9–1.1)

## 2020-07-30 ENCOUNTER — COMMUNICATION - HEALTHEAST (OUTPATIENT)
Dept: FAMILY MEDICINE | Facility: CLINIC | Age: 80
End: 2020-07-30

## 2020-07-31 ENCOUNTER — COMMUNICATION - HEALTHEAST (OUTPATIENT)
Dept: FAMILY MEDICINE | Facility: CLINIC | Age: 80
End: 2020-07-31

## 2020-07-31 ENCOUNTER — AMBULATORY - HEALTHEAST (OUTPATIENT)
Dept: LAB | Facility: CLINIC | Age: 80
End: 2020-07-31

## 2020-07-31 DIAGNOSIS — R56.9 SEIZURE (H): ICD-10-CM

## 2020-08-03 ENCOUNTER — AMBULATORY - HEALTHEAST (OUTPATIENT)
Dept: LAB | Facility: CLINIC | Age: 80
End: 2020-08-03

## 2020-08-03 ENCOUNTER — COMMUNICATION - HEALTHEAST (OUTPATIENT)
Dept: FAMILY MEDICINE | Facility: CLINIC | Age: 80
End: 2020-08-03

## 2020-08-03 DIAGNOSIS — R56.9 SEIZURE (H): ICD-10-CM

## 2020-08-03 LAB — LEVETIRACETAM (KEPPRA): 37.8 UG/ML (ref 6–46)

## 2020-08-06 ENCOUNTER — COMMUNICATION - HEALTHEAST (OUTPATIENT)
Dept: FAMILY MEDICINE | Facility: CLINIC | Age: 80
End: 2020-08-06

## 2020-08-11 ENCOUNTER — AMBULATORY - HEALTHEAST (OUTPATIENT)
Dept: LAB | Facility: CLINIC | Age: 80
End: 2020-08-11

## 2020-08-11 ENCOUNTER — COMMUNICATION - HEALTHEAST (OUTPATIENT)
Dept: ANTICOAGULATION | Facility: CLINIC | Age: 80
End: 2020-08-11

## 2020-08-11 DIAGNOSIS — I48.20 CHRONIC ATRIAL FIBRILLATION (H): ICD-10-CM

## 2020-08-11 LAB — INR PPP: 3.5 (ref 0.9–1.1)

## 2020-08-25 ENCOUNTER — COMMUNICATION - HEALTHEAST (OUTPATIENT)
Dept: ANTICOAGULATION | Facility: CLINIC | Age: 80
End: 2020-08-25

## 2020-08-25 ENCOUNTER — AMBULATORY - HEALTHEAST (OUTPATIENT)
Dept: LAB | Facility: CLINIC | Age: 80
End: 2020-08-25

## 2020-08-25 DIAGNOSIS — I48.20 CHRONIC ATRIAL FIBRILLATION (H): ICD-10-CM

## 2020-08-25 LAB — INR PPP: 1.2 (ref 0.9–1.1)

## 2020-08-31 ENCOUNTER — AMBULATORY - HEALTHEAST (OUTPATIENT)
Dept: LAB | Facility: CLINIC | Age: 80
End: 2020-08-31

## 2020-08-31 ENCOUNTER — COMMUNICATION - HEALTHEAST (OUTPATIENT)
Dept: ANTICOAGULATION | Facility: CLINIC | Age: 80
End: 2020-08-31

## 2020-08-31 DIAGNOSIS — I48.20 CHRONIC ATRIAL FIBRILLATION (H): ICD-10-CM

## 2020-08-31 LAB — INR PPP: 1.4 (ref 0.9–1.1)

## 2020-09-04 ENCOUNTER — AMBULATORY - HEALTHEAST (OUTPATIENT)
Dept: LAB | Facility: CLINIC | Age: 80
End: 2020-09-04

## 2020-09-04 ENCOUNTER — COMMUNICATION - HEALTHEAST (OUTPATIENT)
Dept: ANTICOAGULATION | Facility: CLINIC | Age: 80
End: 2020-09-04

## 2020-09-04 DIAGNOSIS — I48.20 CHRONIC ATRIAL FIBRILLATION (H): ICD-10-CM

## 2020-09-04 LAB — INR PPP: 1.8 (ref 0.9–1.1)

## 2020-09-15 ENCOUNTER — AMBULATORY - HEALTHEAST (OUTPATIENT)
Dept: LAB | Facility: CLINIC | Age: 80
End: 2020-09-15

## 2020-09-15 ENCOUNTER — COMMUNICATION - HEALTHEAST (OUTPATIENT)
Dept: ANTICOAGULATION | Facility: CLINIC | Age: 80
End: 2020-09-15

## 2020-09-15 DIAGNOSIS — I48.20 CHRONIC ATRIAL FIBRILLATION (H): ICD-10-CM

## 2020-09-15 LAB — INR PPP: 1.8 (ref 0.9–1.1)

## 2020-09-25 ENCOUNTER — COMMUNICATION - HEALTHEAST (OUTPATIENT)
Dept: FAMILY MEDICINE | Facility: CLINIC | Age: 80
End: 2020-09-25

## 2020-09-25 DIAGNOSIS — I48.20 CHRONIC ATRIAL FIBRILLATION (H): ICD-10-CM

## 2020-09-29 ENCOUNTER — COMMUNICATION - HEALTHEAST (OUTPATIENT)
Dept: ANTICOAGULATION | Facility: CLINIC | Age: 80
End: 2020-09-29

## 2020-09-29 ENCOUNTER — AMBULATORY - HEALTHEAST (OUTPATIENT)
Dept: LAB | Facility: CLINIC | Age: 80
End: 2020-09-29

## 2020-09-29 DIAGNOSIS — I48.20 CHRONIC ATRIAL FIBRILLATION (H): ICD-10-CM

## 2020-09-29 LAB — INR PPP: 2.4 (ref 0.9–1.1)

## 2020-10-15 ENCOUNTER — AMBULATORY - HEALTHEAST (OUTPATIENT)
Dept: LAB | Facility: CLINIC | Age: 80
End: 2020-10-15

## 2020-10-15 ENCOUNTER — COMMUNICATION - HEALTHEAST (OUTPATIENT)
Dept: ANTICOAGULATION | Facility: CLINIC | Age: 80
End: 2020-10-15

## 2020-10-15 DIAGNOSIS — I48.20 CHRONIC ATRIAL FIBRILLATION (H): ICD-10-CM

## 2020-10-15 LAB — INR PPP: 2.6 (ref 0.9–1.1)

## 2020-10-22 ENCOUNTER — OFFICE VISIT - HEALTHEAST (OUTPATIENT)
Dept: FAMILY MEDICINE | Facility: CLINIC | Age: 80
End: 2020-10-22

## 2020-10-22 DIAGNOSIS — M89.9 DISORDER OF BONE AND CARTILAGE: ICD-10-CM

## 2020-10-22 DIAGNOSIS — M94.9 DISORDER OF BONE AND CARTILAGE: ICD-10-CM

## 2020-10-22 DIAGNOSIS — E78.00 PURE HYPERCHOLESTEROLEMIA: ICD-10-CM

## 2020-10-22 DIAGNOSIS — I10 ESSENTIAL HYPERTENSION: ICD-10-CM

## 2020-10-22 DIAGNOSIS — G40.109 LOCALIZATION-RELATED FOCAL EPILEPSY WITH SIMPLE PARTIAL SEIZURES (H): ICD-10-CM

## 2020-10-22 DIAGNOSIS — I48.20 CHRONIC ATRIAL FIBRILLATION (H): ICD-10-CM

## 2020-10-22 LAB
ALBUMIN SERPL-MCNC: 3.7 G/DL (ref 3.5–5)
ALP SERPL-CCNC: 152 U/L (ref 45–120)
ALT SERPL W P-5'-P-CCNC: 19 U/L (ref 0–45)
ANION GAP SERPL CALCULATED.3IONS-SCNC: 11 MMOL/L (ref 5–18)
AST SERPL W P-5'-P-CCNC: 26 U/L (ref 0–40)
BILIRUB SERPL-MCNC: 0.6 MG/DL (ref 0–1)
BUN SERPL-MCNC: 18 MG/DL (ref 8–28)
CALCIUM SERPL-MCNC: 9.1 MG/DL (ref 8.5–10.5)
CHLORIDE BLD-SCNC: 106 MMOL/L (ref 98–107)
CHOLEST SERPL-MCNC: 153 MG/DL
CO2 SERPL-SCNC: 26 MMOL/L (ref 22–31)
CREAT SERPL-MCNC: 0.94 MG/DL (ref 0.6–1.1)
FASTING STATUS PATIENT QL REPORTED: NO
GFR SERPL CREATININE-BSD FRML MDRD: 57 ML/MIN/1.73M2
GLUCOSE BLD-MCNC: 88 MG/DL (ref 70–125)
HDLC SERPL-MCNC: 52 MG/DL
LDLC SERPL CALC-MCNC: 87 MG/DL
POTASSIUM BLD-SCNC: 3.9 MMOL/L (ref 3.5–5)
PROT SERPL-MCNC: 6.6 G/DL (ref 6–8)
SODIUM SERPL-SCNC: 143 MMOL/L (ref 136–145)
TRIGL SERPL-MCNC: 72 MG/DL

## 2020-10-23 ENCOUNTER — COMMUNICATION - HEALTHEAST (OUTPATIENT)
Dept: FAMILY MEDICINE | Facility: CLINIC | Age: 80
End: 2020-10-23

## 2020-11-12 ENCOUNTER — AMBULATORY - HEALTHEAST (OUTPATIENT)
Dept: LAB | Facility: CLINIC | Age: 80
End: 2020-11-12

## 2020-11-12 ENCOUNTER — COMMUNICATION - HEALTHEAST (OUTPATIENT)
Dept: ANTICOAGULATION | Facility: CLINIC | Age: 80
End: 2020-11-12

## 2020-11-12 DIAGNOSIS — I48.20 CHRONIC ATRIAL FIBRILLATION (H): ICD-10-CM

## 2020-11-12 LAB — INR PPP: 3 (ref 0.9–1.1)

## 2020-11-14 ENCOUNTER — COMMUNICATION - HEALTHEAST (OUTPATIENT)
Dept: FAMILY MEDICINE | Facility: CLINIC | Age: 80
End: 2020-11-14

## 2020-11-14 DIAGNOSIS — I10 ESSENTIAL HYPERTENSION: ICD-10-CM

## 2020-11-16 ENCOUNTER — AMBULATORY - HEALTHEAST (OUTPATIENT)
Dept: NURSING | Facility: CLINIC | Age: 80
End: 2020-11-16

## 2020-11-16 RX ORDER — LISINOPRIL 20 MG/1
TABLET ORAL
Qty: 90 TABLET | Refills: 3 | Status: SHIPPED | OUTPATIENT
Start: 2020-11-16 | End: 2021-10-14

## 2020-11-18 ENCOUNTER — COMMUNICATION - HEALTHEAST (OUTPATIENT)
Dept: FAMILY MEDICINE | Facility: CLINIC | Age: 80
End: 2020-11-18

## 2020-12-10 ENCOUNTER — COMMUNICATION - HEALTHEAST (OUTPATIENT)
Dept: ANTICOAGULATION | Facility: CLINIC | Age: 80
End: 2020-12-10

## 2020-12-10 ENCOUNTER — AMBULATORY - HEALTHEAST (OUTPATIENT)
Dept: LAB | Facility: CLINIC | Age: 80
End: 2020-12-10

## 2020-12-10 DIAGNOSIS — I48.20 CHRONIC ATRIAL FIBRILLATION (H): ICD-10-CM

## 2020-12-10 LAB — INR PPP: 3.5 (ref 0.9–1.1)

## 2020-12-11 ENCOUNTER — COMMUNICATION - HEALTHEAST (OUTPATIENT)
Dept: FAMILY MEDICINE | Facility: CLINIC | Age: 80
End: 2020-12-11

## 2020-12-17 ENCOUNTER — COMMUNICATION - HEALTHEAST (OUTPATIENT)
Dept: FAMILY MEDICINE | Facility: CLINIC | Age: 80
End: 2020-12-17

## 2020-12-17 DIAGNOSIS — I10 ESSENTIAL HYPERTENSION: ICD-10-CM

## 2020-12-18 RX ORDER — ATENOLOL 100 MG/1
TABLET ORAL
Qty: 90 TABLET | Refills: 3 | Status: SHIPPED | OUTPATIENT
Start: 2020-12-18 | End: 2021-11-26

## 2020-12-22 ENCOUNTER — COMMUNICATION - HEALTHEAST (OUTPATIENT)
Dept: FAMILY MEDICINE | Facility: CLINIC | Age: 80
End: 2020-12-22

## 2020-12-22 DIAGNOSIS — I48.20 CHRONIC ATRIAL FIBRILLATION (H): ICD-10-CM

## 2021-01-01 ENCOUNTER — LAB (OUTPATIENT)
Dept: LAB | Facility: CLINIC | Age: 81
End: 2021-01-01
Payer: MEDICARE

## 2021-01-01 ENCOUNTER — ANTICOAGULATION THERAPY VISIT (OUTPATIENT)
Dept: ANTICOAGULATION | Facility: CLINIC | Age: 81
End: 2021-01-01

## 2021-01-01 DIAGNOSIS — I48.20 CHRONIC ATRIAL FIBRILLATION (H): Chronic | ICD-10-CM

## 2021-01-01 LAB — INR BLD: 2.6 (ref 0.9–1.1)

## 2021-01-01 PROCEDURE — 36415 COLL VENOUS BLD VENIPUNCTURE: CPT

## 2021-01-01 PROCEDURE — 85610 PROTHROMBIN TIME: CPT

## 2021-01-05 ENCOUNTER — COMMUNICATION - HEALTHEAST (OUTPATIENT)
Dept: ANTICOAGULATION | Facility: CLINIC | Age: 81
End: 2021-01-05

## 2021-01-05 ENCOUNTER — AMBULATORY - HEALTHEAST (OUTPATIENT)
Dept: LAB | Facility: CLINIC | Age: 81
End: 2021-01-05

## 2021-01-05 DIAGNOSIS — I48.20 CHRONIC ATRIAL FIBRILLATION (H): ICD-10-CM

## 2021-01-05 LAB — INR PPP: 2.2 (ref 0.9–1.1)

## 2021-01-23 ENCOUNTER — COMMUNICATION - HEALTHEAST (OUTPATIENT)
Dept: FAMILY MEDICINE | Facility: CLINIC | Age: 81
End: 2021-01-23

## 2021-01-23 DIAGNOSIS — K21.9 GASTROESOPHAGEAL REFLUX DISEASE WITHOUT ESOPHAGITIS: ICD-10-CM

## 2021-01-23 DIAGNOSIS — E78.00 PURE HYPERCHOLESTEROLEMIA: ICD-10-CM

## 2021-01-23 DIAGNOSIS — I10 ESSENTIAL HYPERTENSION: ICD-10-CM

## 2021-01-24 RX ORDER — HYDROCHLOROTHIAZIDE 25 MG/1
25 TABLET ORAL DAILY
Qty: 90 TABLET | Refills: 2 | Status: SHIPPED | OUTPATIENT
Start: 2021-01-24 | End: 2021-10-14

## 2021-01-24 RX ORDER — ATORVASTATIN CALCIUM 20 MG/1
TABLET, FILM COATED ORAL
Qty: 90 TABLET | Refills: 2 | Status: SHIPPED | OUTPATIENT
Start: 2021-01-24 | End: 2021-10-14

## 2021-02-02 ENCOUNTER — AMBULATORY - HEALTHEAST (OUTPATIENT)
Dept: LAB | Facility: CLINIC | Age: 81
End: 2021-02-02

## 2021-02-02 ENCOUNTER — COMMUNICATION - HEALTHEAST (OUTPATIENT)
Dept: ANTICOAGULATION | Facility: CLINIC | Age: 81
End: 2021-02-02

## 2021-02-02 DIAGNOSIS — I48.20 CHRONIC ATRIAL FIBRILLATION (H): ICD-10-CM

## 2021-02-02 LAB — INR PPP: 1.9 (ref 0.9–1.1)

## 2021-02-11 ENCOUNTER — HOSPITAL ENCOUNTER (OUTPATIENT)
Dept: MAMMOGRAPHY | Facility: CLINIC | Age: 81
Discharge: HOME | End: 2021-02-11

## 2021-02-11 DIAGNOSIS — Z12.31 ENCOUNTER FOR SCREENING MAMMOGRAM FOR MALIGNANT NEOPLASM OF BREAST: ICD-10-CM

## 2021-02-11 DIAGNOSIS — C50.512 MALIGNANT NEOPLASM OF LOWER-OUTER QUADRANT OF LEFT BREAST OF FEMALE, ESTROGEN RECEPTOR NEGATIVE (H): ICD-10-CM

## 2021-02-11 DIAGNOSIS — Z17.1 MALIGNANT NEOPLASM OF LOWER-OUTER QUADRANT OF LEFT BREAST OF FEMALE, ESTROGEN RECEPTOR NEGATIVE (H): ICD-10-CM

## 2021-02-16 ENCOUNTER — COMMUNICATION - HEALTHEAST (OUTPATIENT)
Dept: ANTICOAGULATION | Facility: CLINIC | Age: 81
End: 2021-02-16

## 2021-02-16 ENCOUNTER — AMBULATORY - HEALTHEAST (OUTPATIENT)
Dept: LAB | Facility: CLINIC | Age: 81
End: 2021-02-16

## 2021-02-16 DIAGNOSIS — I48.20 CHRONIC ATRIAL FIBRILLATION (H): ICD-10-CM

## 2021-02-16 LAB — INR PPP: 1.9 (ref 0.9–1.1)

## 2021-02-17 ENCOUNTER — HOSPITAL ENCOUNTER (OUTPATIENT)
Dept: MAMMOGRAPHY | Facility: CLINIC | Age: 81
Discharge: HOME | End: 2021-02-17

## 2021-02-17 ENCOUNTER — HOSPITAL ENCOUNTER (OUTPATIENT)
Dept: ULTRASOUND IMAGING | Facility: CLINIC | Age: 81
Discharge: HOME | End: 2021-02-17

## 2021-02-17 DIAGNOSIS — N64.59 THICKENING OF SKIN OF BREAST: ICD-10-CM

## 2021-02-18 ENCOUNTER — COMMUNICATION - HEALTHEAST (OUTPATIENT)
Dept: SURGERY | Facility: CLINIC | Age: 81
End: 2021-02-18

## 2021-02-19 ENCOUNTER — COMMUNICATION - HEALTHEAST (OUTPATIENT)
Dept: ANTICOAGULATION | Facility: CLINIC | Age: 81
End: 2021-02-19

## 2021-02-19 DIAGNOSIS — I48.20 CHRONIC ATRIAL FIBRILLATION (H): ICD-10-CM

## 2021-02-23 ENCOUNTER — AMBULATORY - HEALTHEAST (OUTPATIENT)
Dept: ONCOLOGY | Facility: CLINIC | Age: 81
End: 2021-02-23

## 2021-03-02 ENCOUNTER — COMMUNICATION - HEALTHEAST (OUTPATIENT)
Dept: ANTICOAGULATION | Facility: CLINIC | Age: 81
End: 2021-03-02

## 2021-03-02 ENCOUNTER — AMBULATORY - HEALTHEAST (OUTPATIENT)
Dept: LAB | Facility: CLINIC | Age: 81
End: 2021-03-02

## 2021-03-02 DIAGNOSIS — I48.20 CHRONIC ATRIAL FIBRILLATION (H): ICD-10-CM

## 2021-03-02 LAB — INR PPP: 2.8 (ref 0.9–1.1)

## 2021-03-15 ENCOUNTER — COMMUNICATION - HEALTHEAST (OUTPATIENT)
Dept: ANTICOAGULATION | Facility: CLINIC | Age: 81
End: 2021-03-15

## 2021-03-15 ENCOUNTER — AMBULATORY - HEALTHEAST (OUTPATIENT)
Dept: LAB | Facility: CLINIC | Age: 81
End: 2021-03-15

## 2021-03-15 DIAGNOSIS — I48.20 CHRONIC ATRIAL FIBRILLATION (H): ICD-10-CM

## 2021-03-15 LAB — INR PPP: 2.4 (ref 0.9–1.1)

## 2021-03-16 ENCOUNTER — COMMUNICATION - HEALTHEAST (OUTPATIENT)
Dept: SURGERY | Facility: CLINIC | Age: 81
End: 2021-03-16

## 2021-03-22 ENCOUNTER — COMMUNICATION - HEALTHEAST (OUTPATIENT)
Dept: SURGERY | Facility: CLINIC | Age: 81
End: 2021-03-22

## 2021-03-23 ENCOUNTER — AMBULATORY - HEALTHEAST (OUTPATIENT)
Dept: SURGERY | Facility: CLINIC | Age: 81
End: 2021-03-23

## 2021-03-23 DIAGNOSIS — Z11.59 ENCOUNTER FOR SCREENING FOR OTHER VIRAL DISEASES: ICD-10-CM

## 2021-04-13 ENCOUNTER — COMMUNICATION - HEALTHEAST (OUTPATIENT)
Dept: ANTICOAGULATION | Facility: CLINIC | Age: 81
End: 2021-04-13

## 2021-04-13 ENCOUNTER — AMBULATORY - HEALTHEAST (OUTPATIENT)
Dept: LAB | Facility: CLINIC | Age: 81
End: 2021-04-13

## 2021-04-13 DIAGNOSIS — I48.20 CHRONIC ATRIAL FIBRILLATION (H): ICD-10-CM

## 2021-04-13 LAB — INR PPP: 2 (ref 0.9–1.1)

## 2021-04-14 ENCOUNTER — COMMUNICATION - HEALTHEAST (OUTPATIENT)
Dept: SCHEDULING | Facility: CLINIC | Age: 81
End: 2021-04-14

## 2021-04-15 ENCOUNTER — OFFICE VISIT - HEALTHEAST (OUTPATIENT)
Dept: FAMILY MEDICINE | Facility: CLINIC | Age: 81
End: 2021-04-15

## 2021-04-15 DIAGNOSIS — G40.109 LOCALIZATION-RELATED FOCAL EPILEPSY WITH SIMPLE PARTIAL SEIZURES (H): ICD-10-CM

## 2021-04-15 DIAGNOSIS — Z85.3 HISTORY OF LEFT BREAST CANCER: ICD-10-CM

## 2021-04-15 DIAGNOSIS — Z01.818 PREOP GENERAL PHYSICAL EXAM: ICD-10-CM

## 2021-04-15 DIAGNOSIS — I48.20 CHRONIC ATRIAL FIBRILLATION (H): ICD-10-CM

## 2021-04-15 DIAGNOSIS — M89.9 DISORDER OF BONE AND CARTILAGE: ICD-10-CM

## 2021-04-15 DIAGNOSIS — I10 ESSENTIAL HYPERTENSION: ICD-10-CM

## 2021-04-15 DIAGNOSIS — M94.9 DISORDER OF BONE AND CARTILAGE: ICD-10-CM

## 2021-04-15 DIAGNOSIS — C50.512 MALIGNANT NEOPLASM OF LOWER-OUTER QUADRANT OF LEFT BREAST OF FEMALE, ESTROGEN RECEPTOR NEGATIVE (H): ICD-10-CM

## 2021-04-15 DIAGNOSIS — Z17.1 MALIGNANT NEOPLASM OF LOWER-OUTER QUADRANT OF LEFT BREAST OF FEMALE, ESTROGEN RECEPTOR NEGATIVE (H): ICD-10-CM

## 2021-04-15 LAB
ANION GAP SERPL CALCULATED.3IONS-SCNC: 9 MMOL/L (ref 5–18)
ATRIAL RATE - MUSE: 68 BPM
BUN SERPL-MCNC: 25 MG/DL (ref 8–28)
CALCIUM SERPL-MCNC: 10.1 MG/DL (ref 8.5–10.5)
CHLORIDE BLD-SCNC: 106 MMOL/L (ref 98–107)
CO2 SERPL-SCNC: 30 MMOL/L (ref 22–31)
CREAT SERPL-MCNC: 0.93 MG/DL (ref 0.6–1.1)
DIASTOLIC BLOOD PRESSURE - MUSE: NORMAL
GFR SERPL CREATININE-BSD FRML MDRD: 58 ML/MIN/1.73M2
GLUCOSE BLD-MCNC: 92 MG/DL (ref 70–125)
HGB BLD-MCNC: 13.4 G/DL (ref 12–16)
INTERPRETATION ECG - MUSE: NORMAL
P AXIS - MUSE: NORMAL
POTASSIUM BLD-SCNC: 4.1 MMOL/L (ref 3.5–5)
PR INTERVAL - MUSE: NORMAL
QRS DURATION - MUSE: 78 MS
QT - MUSE: 402 MS
QTC - MUSE: 434 MS
R AXIS - MUSE: -63 DEGREES
SODIUM SERPL-SCNC: 145 MMOL/L (ref 136–145)
SYSTOLIC BLOOD PRESSURE - MUSE: NORMAL
T AXIS - MUSE: -2 DEGREES
VENTRICULAR RATE- MUSE: 70 BPM

## 2021-04-15 ASSESSMENT — MIFFLIN-ST. JEOR: SCORE: 937.83

## 2021-04-16 ENCOUNTER — COMMUNICATION - HEALTHEAST (OUTPATIENT)
Dept: FAMILY MEDICINE | Facility: CLINIC | Age: 81
End: 2021-04-16

## 2021-04-16 LAB — 25(OH)D3 SERPL-MCNC: 53.3 NG/ML (ref 30–80)

## 2021-04-17 ENCOUNTER — AMBULATORY - HEALTHEAST (OUTPATIENT)
Dept: FAMILY MEDICINE | Facility: CLINIC | Age: 81
End: 2021-04-17

## 2021-04-17 DIAGNOSIS — Z11.59 ENCOUNTER FOR SCREENING FOR OTHER VIRAL DISEASES: ICD-10-CM

## 2021-04-18 LAB
SARS-COV-2 PCR COMMENT: NORMAL
SARS-COV-2 RNA SPEC QL NAA+PROBE: NEGATIVE
SARS-COV-2 VIRUS SPECIMEN SOURCE: NORMAL

## 2021-04-19 ENCOUNTER — COMMUNICATION - HEALTHEAST (OUTPATIENT)
Dept: SCHEDULING | Facility: CLINIC | Age: 81
End: 2021-04-19

## 2021-04-21 ENCOUNTER — ANESTHESIA - HEALTHEAST (OUTPATIENT)
Dept: SURGERY | Facility: CLINIC | Age: 81
End: 2021-04-21

## 2021-04-21 ENCOUNTER — AMBULATORY - HEALTHEAST (OUTPATIENT)
Dept: ANTICOAGULATION | Facility: CLINIC | Age: 81
End: 2021-04-21

## 2021-04-21 ENCOUNTER — SURGERY - HEALTHEAST (OUTPATIENT)
Dept: SURGERY | Facility: CLINIC | Age: 81
End: 2021-04-21

## 2021-04-21 ASSESSMENT — MIFFLIN-ST. JEOR: SCORE: 943.5

## 2021-04-22 ENCOUNTER — COMMUNICATION - HEALTHEAST (OUTPATIENT)
Dept: ADMINISTRATIVE | Facility: HOSPITAL | Age: 81
End: 2021-04-22

## 2021-04-29 ENCOUNTER — AMBULATORY - HEALTHEAST (OUTPATIENT)
Dept: LAB | Facility: CLINIC | Age: 81
End: 2021-04-29

## 2021-04-29 ENCOUNTER — COMMUNICATION - HEALTHEAST (OUTPATIENT)
Dept: ANTICOAGULATION | Facility: CLINIC | Age: 81
End: 2021-04-29

## 2021-04-29 DIAGNOSIS — I48.20 CHRONIC ATRIAL FIBRILLATION (H): ICD-10-CM

## 2021-04-29 LAB — INR PPP: 1.7 (ref 0.9–1.1)

## 2021-05-11 ENCOUNTER — COMMUNICATION - HEALTHEAST (OUTPATIENT)
Dept: ANTICOAGULATION | Facility: CLINIC | Age: 81
End: 2021-05-11

## 2021-05-11 ENCOUNTER — AMBULATORY - HEALTHEAST (OUTPATIENT)
Dept: LAB | Facility: CLINIC | Age: 81
End: 2021-05-11

## 2021-05-11 DIAGNOSIS — I48.20 CHRONIC ATRIAL FIBRILLATION (H): ICD-10-CM

## 2021-05-11 LAB — INR PPP: 2.6 (ref 0.9–1.1)

## 2021-05-19 ENCOUNTER — RECORDS - HEALTHEAST (OUTPATIENT)
Dept: ADMINISTRATIVE | Facility: OTHER | Age: 81
End: 2021-05-19

## 2021-05-25 ENCOUNTER — AMBULATORY - HEALTHEAST (OUTPATIENT)
Dept: LAB | Facility: CLINIC | Age: 81
End: 2021-05-25

## 2021-05-25 ENCOUNTER — COMMUNICATION - HEALTHEAST (OUTPATIENT)
Dept: ANTICOAGULATION | Facility: CLINIC | Age: 81
End: 2021-05-25

## 2021-05-25 DIAGNOSIS — I48.20 CHRONIC ATRIAL FIBRILLATION (H): ICD-10-CM

## 2021-05-25 LAB — INR PPP: 2.5 (ref 0.9–1.1)

## 2021-05-26 ENCOUNTER — RECORDS - HEALTHEAST (OUTPATIENT)
Dept: ADMINISTRATIVE | Facility: CLINIC | Age: 81
End: 2021-05-26

## 2021-05-27 ENCOUNTER — RECORDS - HEALTHEAST (OUTPATIENT)
Dept: ADMINISTRATIVE | Facility: CLINIC | Age: 81
End: 2021-05-27

## 2021-05-27 VITALS — OXYGEN SATURATION: 98 % | DIASTOLIC BLOOD PRESSURE: 88 MMHG | HEART RATE: 81 BPM | SYSTOLIC BLOOD PRESSURE: 118 MMHG

## 2021-05-27 NOTE — TELEPHONE ENCOUNTER
ANTICOAGULATION  MANAGEMENT    Assessment     Today's INR result of 2.50 is Therapeutic (goal INR of 2.0-3.0)        Warfarin taken as previously instructed    No new diet changes affecting INR    No new medication/supplements affecting INR    Continues to tolerate warfarin with no reported s/s of bleeding or thromboembolism     Previous INR was Therapeutic    Plan:     Spoke with Elvira regarding INR result and instructed:     Warfarin Dosing Instructions:  Continue current warfarin dose 5 mg daily (0 % change)    Instructed patient to follow up no later than: 4-6 weeks    Education provided: importance of consistent vitamin K intake, importance of notifying clinic for changes in medications and importance of notifying clinic for diarrhea, nausea/vomiting, reduced intake and/or illness    Elvira verbalizes understanding and agrees to warfarin dosing plan.    Instructed to call the [facility] for any changes, questions or concerns. (#[phone])   ?   Brenna Frankel RN    Subjective/Objective:      Elvira Bush, a 79 y.o. female is on warfarin.     Elvira reports:     Home warfarin dose: as updated on anticoagulation calendar per template     Missed doses: No     Medication changes:  No     S/S of bleeding or thromboembolism:  No     New Injury or illness:  No     Changes in diet or alcohol consumption:  No     Upcoming surgery, procedure or cardioversion:  No    Anticoagulation Episode Summary     Current INR goal:   2.0-3.0   TTR:   75.4 % (1.6 y)   Next INR check:   5/28/2019   INR from last check:   2.50 (4/16/2019)   Weekly max warfarin dose:      Target end date:   Indefinite   INR check location:      Preferred lab:      Send INR reminders to:   ANTICOAGULATION POOL C (DTN,VAD,CGR,GAV)       Comments:            Anticoagulation Care Providers     Provider Role Specialty Phone number    Loli Alberts MD Referring Family Medicine [phone]

## 2021-05-27 NOTE — TELEPHONE ENCOUNTER
Anticoagulation Annual Referral Renewal Review    Elvira Bush's chart reviewed for annual renewal of referral to anticoagulation monitoring.        Criteria for anticoagulation nurse and/or pharmacist renewal met   Warfarin indication: Atrial Fibrillation Yes, per indication   Current with INR monitoring/compliant Yes Yes   Date of last office visit 2/1/19 Yes, had office visit within last year   Time in Therapeutic Range (TTR) 92.97 % Yes, TTR > 60%       Elvira Bush met all criteria for anticoagulation management program initiated renewal.  New INR standing orders and anticoagulation referral renewal placed.      Kathi Iqbal RN  4:36 PM

## 2021-05-28 ENCOUNTER — RECORDS - HEALTHEAST (OUTPATIENT)
Dept: ADMINISTRATIVE | Facility: CLINIC | Age: 81
End: 2021-05-28

## 2021-05-28 NOTE — PROGRESS NOTES
SUBJECTIVE: Elvira Bush is a 79 y.o. White or  female who presents today for follow-up of her epileptic seizure earlier this year.  She was in the hospital on January 25.  She had a breakthrough seizure and it was thought that perhaps it was because she had waited longer than the usual 12 hours between Keppra dosing.  At that time her Keppra dose was increased to 750 mg every 12 hours.  Her driving was restricted and so she has been unable to drive and will not be able to drive till April 26.  She brings in some paperwork for me to fill out so that she can go back to driving at that time.  She was to watch for symptoms of onset of epilepsy which include nausea and sleepiness for her.  She has not been epileptic since a young age but has instead been found to have encephalomalacia on imaging.  This has caused focal onset epilepsy with simple partial seizure.  She needs a Keppra level done today.  She has a history of chronic A. fib and is on Coumadin, she has hypertension which is adequately controlled, hyperlipidemia, osteopenia with her last DEXA scan in November 2017.  She has a history of a right sided stroke, likely due to her atrial fibrillation that was not identified.    OBJECTIVE: /88 (Patient Site: Left Arm, Patient Position: Sitting, Cuff Size: Adult Regular)   Pulse 61   Temp 98.1  F (36.7  C) (Oral)   Wt 122 lb 6.4 oz (55.5 kg)   SpO2 98%   Breastfeeding? No   BMI 20.37 kg/m    General: Very healthy and rather spry appearing octogenarian in no acute distress  Heart: Irregular  Lungs: Clear bilaterally  Abdomen: Soft  Extremities: Warm, dry and without edema  Neuro: No focal deficits, cranial nerves II through XII seem intact    ASSESSMENT & PLAN:    1. Localization-related focal epilepsy with simple partial seizures (H)  Levetiracetam [Keppra ]    HM2(CBC w/o Differential)   2. Encephalomalacia on imaging study     3. Chronic atrial fibrillation (H)  warfarin  (COUMADIN/JANTOVEN) 5 MG tablet   4. Essential hypertension  Comprehensive Metabolic Panel   5. Essential Hypercholesterolemia  Lipid Cascade   6. Osteopenia  Vitamin D, Total (25-Hydroxy)    DXA Bone Density Scan     I will order the above-mentioned lab work and get back to her on these results by mail and only call with grossly abnormal values.  I will fax Keppra results to her neurologist.  I have refilled her warfarin prescription.  I have ordered the bone density for next November.  I filled out paperwork for her to return to driving as she has been seizure-free.  She can see me back in 6 months time.    Patient Active Problem List   Diagnosis     Osteopenia     Esophageal reflux     Breast cancer of lower-outer quadrant of left female breast (H)     Essential Hypercholesterolemia     Essential hypertension     Obesity     Headache     Elevated LFTs     Meningioma (H)     Chronic atrial fibrillation (H)     Convulsions, unspecified convulsion type (H)     Encephalomalacia on imaging study     Localization-related focal epilepsy with simple partial seizures (H)       Current Outpatient Medications on File Prior to Visit   Medication Sig Dispense Refill     aspirin 81 MG tablet Take 81 mg by mouth daily.       atenolol (TENORMIN) 100 MG tablet TAKE 1 TABLET BY MOUTH EVERY DAY 90 tablet 2     atorvastatin (LIPITOR) 20 MG tablet TAKE 1 TABLET BY MOUTH EVERYDAY AT BEDTIME 90 tablet 3     calcium-vitamin D (CALCIUM-VITAMIN D) 500 mg(1,250mg) -200 unit per tablet Take 2 tablets by mouth in the morning and 1 tablet by mouth at night.             hydroCHLOROthiazide (HYDRODIURIL) 25 MG tablet TAKE 1 TABLET BY MOUTH EVERY DAY 90 tablet 3     levETIRAcetam (KEPPRA) 750 MG tablet Take 750 mg by mouth 2 (two) times a day.       magnesium oxide (MAG-OX) 400 mg tablet Take 1 tablet (400 mg total) by mouth daily.  0     multivitamin (MULTIVITAMIN) per tablet Take 1 tablet by mouth daily.       omeprazole (PRILOSEC) 20 MG  capsule TAKE 1 CAPSULE BY MOUTH EVERY DAY 90 capsule 3     No current facility-administered medications on file prior to visit.

## 2021-05-28 NOTE — PROGRESS NOTES
[facility] Hematology and Oncology Progress Note    Patient: Elvira Bush  MRN: [id number]  Date of Service: 5/8/2019       Reason for Visit:    Scheduled 6-month follow up    Assessment and Plan:    1 Breast cancer of LOQ (L) female breast     T1c, NX, cM0   Triple negative    79 y.o.     2014, March - abnormal mammogram    03/24/14 - lumpectomy:    Grade 3, 1.1 cm invasive ductal carcinoma,    ER negative, NC barely positive, HER-2/lolita negative    Negative margins.      No lymph node in the biopsy sample.       Lymphovascular invasion.      Patient opted against chemotherapy.      07/14/14 - completed 6040 cGy breast conservation EBRT.    Every 6 month follow up since.    07/26 - 08/02/17 hospitalized with symptoms concerning for a stroke with slurred speech, left hand and left foot weakness.  CTA showed encephalomalacia involving the right temporal lobe likely due to chronic infarct.  There was also likely a subacute infarct in the left basal ganglia. CTA did not show any high grade stenosis or aneurysm.  A partially calcified likely meningioma along right aspect of anterio rfalx was also noted.  MRI was also negative for any acute ischemia.  In the ED she became unresponsive and started seizing which lasted for about 1 minute.  She was loaded with keppra and then started on maintenance dose.  She became unresponsive following the seizure and was intubated and admitted to the ICU.  She was extubated on 7/29/2017.  The patient was found to have a UTI and her culture grew klebsiella, treated with IV rocephin, switched to oral cefdinir at discharge.   She was diagnosed with atrial fibrillation on 6/20/17 during a pre op physical.  She was asymptomatic.  ECHO showed her LVEF @ 55%.  Once she was stabilized she was started on coumadin with an INR goal of 2-2.5.  CHADS2 vasc score is at least 2 given age and HTN and with the old stroke seen on MRI is likely higher.     09/09 - 09/12/17 hospitalized with a left  sacral and pubic rami fracture, displaced L5 transverse process fracture and mechanical fall after tripping off a curb.  Orthopedics declared that she was not a surgical candidate.  She was discharged to [facility] for physical therapy and subsequently discharged from there on October 4, 2017.  During her TCU stay her strength and stability improved to the point that she was able to ambulate with a walker and after PT transitioned to a cane.  She was taking Tylenol, 2-500 mg tabs twice daily during that time.    11/02/17 (B) screening mammogram - benign findings.    11/02/17 DXA scan - The spine bone density L1-L3 with T-score -1.3, stable compared to 2013.  Femoral bone densities show left total hip T- score -1.1 and right femoral neck T-score -1.3 and significant decline of 4.7% on the left hip and 5.6% on the right hip compared to 2013.  Trabecular bone score indicates good trabecular bone architecture.Appropriate evaluation and treatment recommended with follow up bone density scan in 1 year.    11/05/18 (B) screening mammogram - benign findings.    05/08/19:    04/19/19 HEME2 and CMP through PCP WNL.    The patient presents in good spirits, looking and feeling good.  She remains very active, attending an exercise activity program generally 6 days/week with every other day swimming.     Clinically [facility]CO.      Continue calcium 6971-0936 mg and vitamin D 3795-5240 iu daily supplements and bone strengthening activities.    Continue self breast exams.    Yearly mammogram in November.    05/08/20 - yearly follow up without labs.    2) Atrial fib:    Coumadin dosing and monitoring through PCP.    ECOG Performance:     ECOG Performance Status: 0    Distress Assessment:    Distress Assessment Score: 2        TT > 25 minutes face to face with > 50% counseling and care coordination.    Nabor Masters, CNP     CC: Loli Alberts MD  _____________________________________________    Interim History:    The patient  presents looking and feeling good.  She is in excellent spirits.  She continues her daily exercise programs 6 days/week with swimming every other day.  She denies pain, cough, fever, chills, shortness of breath, unusual headaches, visual or mentation disturbance, bowel or bladder issues.  Her appetite is good and weight now quite stable after intentionally losing ~40 pounds over the past couple years through diet and exercise.  She performs self breast exams in the shower and has not noted any concerning changes or findings.       Pain Status:    Currently in Pain: No/denies      Review of Systems:    Constitutional  Constitutional (WDL): All constitutional elements are within defined limits  Neurosensory  Neurosensory (WDL): All neurosensory elements are within defined limits  Cardiovascular  Cardiovascular (WDL): All cardiovascular elements are within defined limits  Pulmonary  Respiratory (WDL): Within Defined Limits  Gastrointestinal  Gastrointestinal (WDL): All gastrointestinal elements are within defined limits  Genitourinary  Genitourinary (WDL): All genitourinary elements are within defined limits  Integumentary  Integumentary (WDL): All integumentary elements are within defined limits  Patient Coping  Patient Coping: Accepting;Open/discussion  Distress Assessment  Distress Assessment Score: 2  Accompanied by  Accompanied by: Alone    Past Histories:    Past Medical History:   Diagnosis Date     Acute respiratory failure, unspecified whether with hypoxia or hypercapnia (H)      Atrial fibrillation (H)      Breast cancer (H) 3-2014     Closed displaced fracture of left pubis, initial encounter (H)      Closed fracture of spinous process of lumbar vertebra (H) 9/9/2017     Encephalopathy      GERD (gastroesophageal reflux disease)      HTN (hypertension)      Hx of radiation therapy 5-2014    Left Breast     Hypercholesterolemia      Intraabdominal hemorrhage 9/9/2017     Osteopenia      Pelvic fracture (H)  9/9/2017     Skin cancer      Transient alteration of awareness          Past Surgical History:   Procedure Laterality Date     BREAST LUMPECTOMY Left 3-2014     KNEE ARTHROSCOPY Right 2009     [facility]  7/27/2017          SKIN CANCER EXCISION       TENDON RELEASE         Physical Exam:    Recent Vitals 5/8/2019   Height -   Weight 122 lbs 11 oz   BSA (m2) 1.6 m2   /91   Pulse 80   Temp 97.6   Temp src 1   SpO2 99   Some recent data might be hidden     General: Alert and oriented.  No distress.  Very pleasant.  HEENT: Anicteric sclera.  EOMI.  JIA.  No mucosal lesions.     Chest:  Lungs clear.    Cardiac: S1, S2 heard.  Regular rate and rhythm.  Soft systolic murmur.    Abdomen: Soft.  Not tender or distend.  No palpable hepatosplenomegaly, masses or ascites.    Extremities:   No edema, cyanosis or clubbing.    Lymphatics:  No palpable lymphadenopathy in the neck, supraclavicular fossa or the armpit.        Breasts:  (R) no palpable lumps or bumps.  Nipple appears normal.  No skin changes.  Unremarkable axilla.    (L) no palpable lumps or bumps.  Well healed scar in the UOQ.  Nipple appears normal.  No skin changes.  Unremarkable axilla.    Patient declined offer of female  during exam.    Lab Results:    Reviewed 04/19/19 laboratories with patient.    Imaging:    No results found.

## 2021-05-28 NOTE — TELEPHONE ENCOUNTER
Who is calling:  Elvira  Reason for Call:  Patient has new Dr. Mahi Lopez, [facility].  Please send lab results from 4/19/19 to Dr. Lopez.  Also change patient's record to show Dr. Lopez as her new neurologist.  Date of last appointment with primary care: 4/19/19  Okay to leave a detailed message: Yes

## 2021-05-28 NOTE — TELEPHONE ENCOUNTER
Records faxed. Please update chart to reflect new neurologist. Fax# [phone]. Phone# [phone]  H.DeGree, JAVI

## 2021-05-29 ENCOUNTER — RECORDS - HEALTHEAST (OUTPATIENT)
Dept: ADMINISTRATIVE | Facility: CLINIC | Age: 81
End: 2021-05-29

## 2021-05-29 NOTE — TELEPHONE ENCOUNTER
ANTICOAGULATION  MANAGEMENT    Assessment     Today's INR result of 1.90 is Subtherapeutic (goal INR of 2.0-3.0)        Warfarin taken as previously instructed    Increased greens/vitamin K intake may be affecting INR    No new medication/supplements affecting INR    Continues to tolerate warfarin with no reported s/s of bleeding or thromboembolism     Previous four INR results have been Therapeutic    Plan:     Spoke with Elvira regarding INR result and instructed:     Warfarin Dosing Instructions:  Continue current warfarin dose 5 mg daily  (0 % change)    Instructed patient to follow up no later than: 2 weeks    Education provided: importance of consistent vitamin K intake, impact of vitamin K foods on INR and vitamin K content of foods    Elvira verbalizes understanding and agrees to warfarin dosing plan.    Instructed to call the [facility] for any changes, questions or concerns. (#[phone])   ?   Brenna Frankel RN    Subjective/Objective:      Elvira Bush, a 79 y.o. female is on warfarin.     Elvira reports:     Home warfarin dose: as updated on anticoagulation calendar per template     Missed doses: No     Medication changes:  No     S/S of bleeding or thromboembolism:  No     New Injury or illness:  No     Changes in diet or alcohol consumption:  Yes: increased vitamin K intake, diet was different due to celebrations for Memorial Day     Upcoming surgery, procedure or cardioversion:  No    Anticoagulation Episode Summary     Current INR goal:   2.0-3.0   TTR:   75.9 % (1.7 y)   Next INR check:   6/11/2019   INR from last check:   1.90! (5/28/2019)   Weekly max warfarin dose:      Target end date:   Indefinite   INR check location:      Preferred lab:      Send INR reminders to:   ANTICOAGULATION POOL C (DTN,VAD,CGR,GAV)       Comments:            Anticoagulation Care Providers     Provider Role Specialty Phone number    Loli Alberts MD Referring Family Medicine [phone]

## 2021-05-29 NOTE — TELEPHONE ENCOUNTER
ANTICOAGULATION  MANAGEMENT    Assessment     Today's INR result of 2.80 is Therapeutic (goal INR of 2.0-3.0)        Warfarin taken as previously instructed    No new diet changes affecting INR    No new medication/supplements affecting INR    Continues to tolerate warfarin with no reported s/s of bleeding or thromboembolism     Previous INR was Subtherapeutic    Plan:     Spoke with Elvira regarding INR result and instructed:     Warfarin Dosing Instructions:  Continue current warfarin dose 5 mg daily  (0 % change)    Instructed patient to follow up no later than: 2 weeks    Education provided: importance of consistent vitamin K intake and importance of following up for INR monitoring at instructed interval    Elvira verbalizes understanding and agrees to warfarin dosing plan.    Instructed to call the [facility] for any changes, questions or concerns. (#[phone])   ?   Brenna Frankel RN    Subjective/Objective:      Elvira Bush, a 79 y.o. female is on warfarin.     Elvira reports:     Home warfarin dose: as updated on anticoagulation calendar per template     Missed doses: No     Medication changes:  No     S/S of bleeding or thromboembolism:  No     New Injury or illness:  No     Changes in diet or alcohol consumption:  No     Upcoming surgery, procedure or cardioversion:  No    Anticoagulation Episode Summary     Current INR goal:   2.0-3.0   TTR:   76.2 % (1.7 y)   Next INR check:   6/25/2019   INR from last check:   2.80 (6/11/2019)   Weekly max warfarin dose:      Target end date:   Indefinite   INR check location:      Preferred lab:      Send INR reminders to:   LAINE ENRIQUEZ       Comments:            Anticoagulation Care Providers     Provider Role Specialty Phone number    Loli Alberts MD Referring Family Medicine [phone]

## 2021-05-30 ENCOUNTER — RECORDS - HEALTHEAST (OUTPATIENT)
Dept: ADMINISTRATIVE | Facility: CLINIC | Age: 81
End: 2021-05-30

## 2021-05-30 VITALS — WEIGHT: 165.2 LBS | HEIGHT: 65 IN | BODY MASS INDEX: 27.52 KG/M2

## 2021-05-30 NOTE — TELEPHONE ENCOUNTER
ANTICOAGULATION  MANAGEMENT    Assessment     6/25/19 INR result of 1.90 is Subtherapeutic (goal INR of 2.0-3.0)        Warfarin taken as previously instructed    No new diet changes affecting INR    No new medication/supplements affecting INR    Continues to tolerate warfarin with no reported s/s of bleeding or thromboembolism     Previous INR was Therapeutic    Plan:     Spoke with Elvira regarding INR result and instructed:     Warfarin Dosing Instructions:  Continue current warfarin dose 5 mg daily  (0 % change)    Instructed patient to follow up no later than: 2 weeks    Education provided: importance of consistent vitamin K intake, impact of vitamin K foods on INR and Strategies to improve compliance (pillbox, alarm, calendar, etc)    Elvira verbalizes understanding and agrees to warfarin dosing plan.    Instructed to call the [facility] for any changes, questions or concerns. (#[phone])   ?   Brenna Frankel RN    Subjective/Objective:      Elvira SALINAS Eleazar, a 79 y.o. female is on warfarin.     Elvira reports:     Home warfarin dose: as updated on anticoagulation calendar per template     Missed doses: No     Medication changes:  No     S/S of bleeding or thromboembolism:  No     New Injury or illness:  No     Changes in diet or alcohol consumption:  No     Upcoming surgery, procedure or cardioversion:  No    Anticoagulation Episode Summary     Current INR goal:   2.0-3.0   TTR:   76.5 % (1.8 y)   Next INR check:   7/9/2019   INR from last check:   1.90! (6/25/2019)   Weekly max warfarin dose:      Target end date:   Indefinite   INR check location:      Preferred lab:      Send INR reminders to:   LAINE ENRIQUEZ       Comments:            Anticoagulation Care Providers     Provider Role Specialty Phone number    Loli Alberts MD Referring Family Medicine [phone]

## 2021-05-30 NOTE — TELEPHONE ENCOUNTER
Anticoagulation Management    Unable to reach Elvira today.    Today's INR result of 1.90 is Subtherapeutic (goal INR of 2.0-3.0)     Follow up required to provide dosing instructions     No instructions provided. Unable to leave voicemail due to patient's voice mail was full.    ACN to follow up    Brenna Frankel RN

## 2021-05-30 NOTE — TELEPHONE ENCOUNTER
ANTICOAGULATION  MANAGEMENT    Assessment     Today's INR result of 2.30 is Therapeutic (goal INR of 2.0-3.0)        Warfarin taken as previously instructed    No new diet changes affecting INR    No new medication/supplements affecting INR    Continues to tolerate warfarin with no reported s/s of bleeding or thromboembolism     Previous INR was Subtherapeutic    Plan:     Spoke with Elvira regarding INR result and instructed:     Warfarin Dosing Instructions:  Continue current warfarin dose 5 mg daily   (0 % change)    Instructed patient to follow up no later than: 2 weeks    Education provided: target INR goal and significance of current INR result    Elvira verbalizes understanding and agrees to warfarin dosing plan.    Instructed to call the [facility] for any changes, questions or concerns. (#[phone])   ?   Brenna Frankel RN    Subjective/Objective:      Elvira Bush, a 79 y.o. female is on warfarin.     Elvira reports:     Home warfarin dose: as updated on anticoagulation calendar per template     Missed doses: No     Medication changes:  No     S/S of bleeding or thromboembolism:  No     New Injury or illness:  No     Changes in diet or alcohol consumption:  No     Upcoming surgery, procedure or cardioversion:  No    Anticoagulation Episode Summary     Current INR goal:   2.0-3.0   TTR:   76.4 % (1.8 y)   Next INR check:   7/23/2019   INR from last check:   2.30 (7/9/2019)   Weekly max warfarin dose:      Target end date:   Indefinite   INR check location:      Preferred lab:      Send INR reminders to:   LAINE ENRIQUEZ       Comments:            Anticoagulation Care Providers     Provider Role Specialty Phone number    Loli Alberts MD Referring Family Medicine [phone]

## 2021-05-30 NOTE — TELEPHONE ENCOUNTER
ANTICOAGULATION  MANAGEMENT    Assessment     Today's INR result of 2.20 is Therapeutic (goal INR of 2.0-3.0)        Warfarin taken as previously instructed    No new diet changes affecting INR    No new medication/supplements affecting INR    Continues to tolerate warfarin with no reported s/s of bleeding or thromboembolism     Previous INR was Therapeutic    Plan:     Spoke with Elvira regarding INR result and instructed:     Warfarin Dosing Instructions:  Continue current warfarin dose 5 mg daily  (0 % change)    Instructed patient to follow up no later than: 4 weeks    Education provided: importance of consistent vitamin K intake and importance of notifying clinic for changes in medications    Elvira verbalizes understanding and agrees to warfarin dosing plan.    Instructed to call the [facility] for any changes, questions or concerns. (#[phone])   ?   Brenna Frankel RN    Subjective/Objective:      Elvira Bush, a 79 y.o. female is on warfarin.     Elvira reports:     Home warfarin dose: as updated on anticoagulation calendar per template     Missed doses: No     Medication changes:  No     S/S of bleeding or thromboembolism:  No     New Injury or illness:  No     Changes in diet or alcohol consumption:  No     Upcoming surgery, procedure or cardioversion:  No    Anticoagulation Episode Summary     Current INR goal:   2.0-3.0   TTR:   77.0 % (1.9 y)   Next INR check:   8/21/2019   INR from last check:   2.20 (7/24/2019)   Weekly max warfarin dose:      Target end date:   Indefinite   INR check location:      Preferred lab:      Send INR reminders to:   LAINE ENRIQUEZ       Comments:            Anticoagulation Care Providers     Provider Role Specialty Phone number    Loli Alberts MD Referring Family Medicine [phone]

## 2021-05-31 ENCOUNTER — RECORDS - HEALTHEAST (OUTPATIENT)
Dept: ADMINISTRATIVE | Facility: CLINIC | Age: 81
End: 2021-05-31

## 2021-05-31 VITALS — BODY MASS INDEX: 25.24 KG/M2 | WEIGHT: 154 LBS

## 2021-05-31 VITALS — WEIGHT: 151.1 LBS | BODY MASS INDEX: 25.14 KG/M2

## 2021-05-31 VITALS — WEIGHT: 156 LBS | BODY MASS INDEX: 25.56 KG/M2

## 2021-05-31 VITALS — BODY MASS INDEX: 28 KG/M2 | WEIGHT: 164 LBS | HEIGHT: 64 IN

## 2021-05-31 VITALS — BODY MASS INDEX: 23.37 KG/M2 | WEIGHT: 142.6 LBS

## 2021-05-31 VITALS — WEIGHT: 164.7 LBS | BODY MASS INDEX: 27.44 KG/M2 | HEIGHT: 65 IN

## 2021-05-31 VITALS — BODY MASS INDEX: 25.56 KG/M2 | WEIGHT: 156 LBS

## 2021-05-31 VITALS — HEIGHT: 66 IN | BODY MASS INDEX: 22.48 KG/M2 | WEIGHT: 139.9 LBS

## 2021-05-31 VITALS — BODY MASS INDEX: 25.96 KG/M2 | WEIGHT: 156 LBS

## 2021-05-31 VITALS — BODY MASS INDEX: 29.12 KG/M2 | WEIGHT: 175 LBS

## 2021-05-31 VITALS — WEIGHT: 159 LBS | BODY MASS INDEX: 26.46 KG/M2

## 2021-05-31 VITALS — WEIGHT: 149 LBS | BODY MASS INDEX: 24.42 KG/M2

## 2021-05-31 VITALS — WEIGHT: 142.4 LBS | BODY MASS INDEX: 23.34 KG/M2

## 2021-05-31 NOTE — TELEPHONE ENCOUNTER
ANTICOAGULATION  MANAGEMENT    Assessment     Today's INR result of 2.8 is Therapeutic (goal INR of 2.0-3.0)        Warfarin taken as previously instructed    No new diet changes affecting INR    No new medication/supplements affecting INR    Continues to tolerate warfarin with no reported s/s of bleeding or thromboembolism     Previous INR was Therapeutic    Plan:     Spoke with Elvira regarding INR result and instructed:     Warfarin Dosing Instructions:  Continue current warfarin dose 5 mg daily.  (0 % change)    Instructed patient to follow up no later than: one month.    Education provided: importance of therapeutic range, importance of following up for INR monitoring at instructed interval and importance of taking warfarin as instructed    Paticia verbalizes understanding and agrees to warfarin dosing plan.    Instructed to call the [facility] for any changes, questions or concerns. (#[phone])   ?   Fatemeh Shetty RN    Subjective/Objective:      Elvira BRAD Bush, a 79 y.o. female is on warfarin.     Elvira reports:     Home warfarin dose: verbally confirmed home dose with Elvira and updated on anticoagulation calendar     Missed doses: No     Medication changes:  No     S/S of bleeding or thromboembolism:  No     New Injury or illness:  No     Changes in diet or alcohol consumption:  No     Upcoming surgery, procedure or cardioversion:  Yes: cataracts 9/17/19    Anticoagulation Episode Summary     Current INR goal:   2.0-3.0   TTR:   77.9 % (1.9 y)   Next INR check:   9/18/2019   INR from last check:   2.80 (8/21/2019)   Weekly max warfarin dose:      Target end date:   Indefinite   INR check location:      Preferred lab:      Send INR reminders to:   LAINE ENRIQUEZ       Comments:            Anticoagulation Care Providers     Provider Role Specialty Phone number    Loli Alberts MD Referring Family Medicine [phone]

## 2021-06-01 VITALS — WEIGHT: 132.7 LBS | HEIGHT: 66 IN | BODY MASS INDEX: 21.33 KG/M2

## 2021-06-01 VITALS — WEIGHT: 138.8 LBS | BODY MASS INDEX: 22.75 KG/M2

## 2021-06-01 NOTE — TELEPHONE ENCOUNTER
ANTICOAGULATION  MANAGEMENT    Assessment     Today's INR result of 2.7 is Therapeutic (goal INR of 2.0-3.0)        Warfarin taken as previously instructed    No new diet changes affecting INR    No new medication/supplements affecting INR    Continues to tolerate warfarin with no reported s/s of bleeding or thromboembolism     Previous INR was Therapeutic    Plan:     Spoke with Elvira regarding INR result and instructed:     Warfarin Dosing Instructions:  Continue current warfarin dose 5 mg daily.    Instructed patient to follow up no later than: 4-6 weeks.    Education provided: importance of taking warfarin as instructed    Pat verbalizes understanding and agrees to warfarin dosing plan.    Instructed to call the [facility] for any changes, questions or concerns. (#[phone])   ?   Ravi Liriano RN    Subjective/Objective:      Elvira Bush, a 79 y.o. female is on warfarin.     Elviar reports:     Home warfarin dose: verbally confirmed home dose with pt and updated on anticoagulation calendar     Missed doses: No     Medication changes:  No     S/S of bleeding or thromboembolism:  No     New Injury or illness:  No     Changes in diet or alcohol consumption:  No     Upcoming surgery, procedure or cardioversion:  Yes: cataract surgery 9/17 and 10/1.     Anticoagulation Episode Summary     Current INR goal:   2.0-3.0   TTR:   88.8 %   Next INR check:   10/28/2019   INR from last check:   2.70 (9/16/2019)   Weekly max warfarin dose:      Target end date:   Indefinite   INR check location:      Preferred lab:      Send INR reminders to:   LAINE ENRIQUEZ       Comments:            Anticoagulation Care Providers     Provider Role Specialty Phone number    Loli Alberts MD Referring Family Medicine [phone]

## 2021-06-01 NOTE — PROGRESS NOTES
"    SUBJECTIVE: Elvira Bush is a 79 y.o. White or  female who presents today for preoperative clearance for bilateral cataract surgery with Dr. Rivera from [facility].  She has a preop physical form that is to be filled out.  She is scheduled for her left eye first on 9/17 and her right eye on 10/1/2019.  She has a history of hypertension with whitecoat syndrome.  She tells me that her blood pressure is controlled at other places.  She checks her own as well.  She says she took her medication this morning about an hour ago.  She also has chronic atrial fibrillation and is on Coumadin.  She is doing well with that.  She has hyperlipidemia osteopenia seizure disorder and these are all chronic and controlled.  She has a history of breast cancer.    OBJECTIVE: BP (!) 160/104 (Patient Site: Right Arm, Patient Position: Sitting, Cuff Size: Adult Regular)   Pulse (!) 59   Resp 16   Ht 5' 3.5\" (1.613 m)   Wt 117 lb 12.8 oz (53.4 kg)   SpO2 98%   Breastfeeding? No   BMI 20.54 kg/m    General: Healthy-appearing mildly forgetful octogenarian in no acute distress    Please refer to the scanned in preop physical form for details of this visit and for the physical exam      ASSESSMENT & PLAN:    1. Preop general physical exam     2. Age-related cataract of both eyes, unspecified age-related cataract type     3. Essential hypertension     4. Essential Hypercholesterolemia     5. Chronic atrial fibrillation (H)     6. Osteopenia     7. Localization-related focal epilepsy with simple partial seizures (H)       I have cleared her for her cataract surgery.  She is a good candidate.  She is due to return to clinic in November for her 6-month med check and lab work.    Patient Active Problem List   Diagnosis     Osteopenia     Esophageal reflux     Breast cancer of lower-outer quadrant of left female breast (H)     Essential Hypercholesterolemia     Essential hypertension     Obesity     Headache     Elevated " LFTs     Meningioma (H)     Chronic atrial fibrillation (H)     Convulsions, unspecified convulsion type (H)     Encephalomalacia on imaging study     Localization-related focal epilepsy with simple partial seizures (H)     History of left breast cancer       Current Outpatient Medications on File Prior to Visit   Medication Sig Dispense Refill     aspirin 81 MG tablet Take 81 mg by mouth daily.       atenolol (TENORMIN) 100 MG tablet TAKE 1 TABLET BY MOUTH EVERY DAY 90 tablet 2     atorvastatin (LIPITOR) 20 MG tablet TAKE 1 TABLET BY MOUTH EVERYDAY AT BEDTIME 90 tablet 3     calcium-vitamin D (CALCIUM-VITAMIN D) 500 mg(1,250mg) -200 unit per tablet Take 2 tablets by mouth in the morning and 1 tablet by mouth at night.             hydroCHLOROthiazide (HYDRODIURIL) 25 MG tablet TAKE 1 TABLET BY MOUTH EVERY DAY 90 tablet 3     levETIRAcetam (KEPPRA) 750 MG tablet Take 750 mg by mouth 2 (two) times a day.       magnesium oxide (MAG-OX) 400 mg tablet Take 1 tablet (400 mg total) by mouth daily.  0     multivitamin (MULTIVITAMIN) per tablet Take 1 tablet by mouth daily.       omeprazole (PRILOSEC) 20 MG capsule TAKE 1 CAPSULE BY MOUTH EVERY DAY 90 capsule 3     warfarin (COUMADIN/JANTOVEN) 5 MG tablet Take one tablet (5 mg) by mouth daily or as instructed. Adjust dose based on INR results as directed. 90 tablet 1     No current facility-administered medications on file prior to visit.

## 2021-06-02 VITALS — BODY MASS INDEX: 20.68 KG/M2 | WEIGHT: 124.1 LBS | HEIGHT: 65 IN

## 2021-06-02 VITALS — BODY MASS INDEX: 19.69 KG/M2 | WEIGHT: 118.3 LBS

## 2021-06-02 VITALS — BODY MASS INDEX: 20.35 KG/M2 | WEIGHT: 124.2 LBS

## 2021-06-02 NOTE — TELEPHONE ENCOUNTER
ANTICOAGULATION  MANAGEMENT    Assessment     Today's INR result of 3.5 is Supratherapeutic (goal INR of 2.0-3.0)        Warfarin taken as previously instructed    Decreased greens/vitamin K intake may be affecting INR, she anticipates getting back to baseline intake going forward    Death in the family last week, she states her schedule was interrupted and she had a lot of events and different meals and stress/grieving    No new medication/supplements affecting INR    Continues to tolerate warfarin with no reported s/s of bleeding or thromboembolism     Previous INR was Therapeutic    Plan:     Spoke with Elvira regarding INR result and instructed:     Warfarin Dosing Instructions:  one time partial hold today, Tue 10/29 take 2.5mg then continue current warfarin dose 5 mg daily     Instructed patient to follow up no later than: 2 weeks    Education provided: impact of vitamin K foods on INR, importance of therapeutic range, target INR goal and significance of current INR result and monitoring for bleeding signs and symptoms    Pat verbalizes understanding and agrees to warfarin dosing plan.    Instructed to call the [facility] for any changes, questions or concerns. (#[phone])   ?   Hilda Lund RN    Subjective/Objective:      Elvira Bush, a 79 y.o. female is on warfarin.     Elvira reports:     Home warfarin dose: as updated on anticoagulation calendar per template     Missed doses: No     Medication changes:  No     S/S of bleeding or thromboembolism:  No     New Injury or illness:  No     Changes in diet or alcohol consumption:  Yes: less vegetables and salads lately     Upcoming surgery, procedure or cardioversion:  No    Anticoagulation Episode Summary     Current INR goal:   2.0-3.0   TTR:   81.5 %   Next INR check:   11/12/2019   INR from last check:   3.50! (10/29/2019)   Weekly max warfarin dose:      Target end date:   Indefinite   INR check location:      Preferred lab:      Send  INR reminders to:   LAINE ENRIQUEZ       Comments:            Anticoagulation Care Providers     Provider Role Specialty Phone number    Loli Alberts MD Referring Family Medicine [phone]

## 2021-06-02 NOTE — TELEPHONE ENCOUNTER
Refill Approved    Rx renewed per Medication Renewal Policy. Medication was last renewed on 1/15/2019       Devon Mederos, [facility] Triage/Med Refill 10/4/2019     Requested Prescriptions   Pending Prescriptions Disp Refills     atenolol (TENORMIN) 100 MG tablet [Pharmacy Med Name: ATENOLOL 100 MG TABLET] 90 tablet 2     Sig: TAKE 1 TABLET BY MOUTH EVERY DAY       Beta-Blockers Refill Protocol Passed - 10/4/2019  1:42 AM        Passed - PCP or prescribing provider visit in past 12 months or next 3 months     Last office visit with prescriber/PCP: 4/19/2019 Loli Alberts MD OR same dept: 4/19/2019 Loli Alberts MD OR same specialty: 4/19/2019 Loli Alberts MD  Last physical: 9/9/2019 Last MTM visit: Visit date not found   Next visit within 3 mo: Visit date not found  Next physical within 3 mo: Visit date not found  Prescriber OR PCP: Carlos) RASTA Alberts MD  Last diagnosis associated with med order: 1. Essential hypertension  - atenolol (TENORMIN) 100 MG tablet [Pharmacy Med Name: ATENOLOL 100 MG TABLET]; TAKE 1 TABLET BY MOUTH EVERY DAY  Dispense: 90 tablet; Refill: 2    If protocol passes may refill for 12 months if within 3 months of last provider visit (or a total of 15 months).             Passed - Blood pressure filed in past 12 months     BP Readings from Last 1 Encounters:   09/09/19 (!) 160/104

## 2021-06-02 NOTE — TELEPHONE ENCOUNTER
Sent refill for Warfarin 5 mg tablets, 90 count with 1 refill. (90-day supply)    Lab Results   Component Value Date    INR 2.70 (H) 09/16/2019    INR 2.80 (H) 08/21/2019    INR 2.20 (H) 07/24/2019       Brenna Frankel, RN  Anticoagulation Nurse  [phone]

## 2021-06-02 NOTE — TELEPHONE ENCOUNTER
Pt dropped of paperwork for DMV to be filled out and sent in      Call her with any questions      10/29/19

## 2021-06-02 NOTE — TELEPHONE ENCOUNTER
RN cannot approve Refill Request    RN can NOT refill this medication med is not covered by policy/route to provider. Last office visit: 4/19/2019 Loli Alberts MD Last Physical: 9/9/2019 Last MTM visit: Visit date not found Last visit same specialty: 4/19/2019 Loli Alberts MD.  Next visit within 3 mo: Visit date not found  Next physical within 3 mo: Visit date not found      Carmela Covarrubias, [facility] Triage/Med Refill 10/13/2019    Requested Prescriptions   Pending Prescriptions Disp Refills     warfarin (COUMADIN/JANTOVEN) 5 MG tablet [Pharmacy Med Name: WARFARIN SODIUM 5 MG TABLET] 90 tablet 1     Sig: TAKE ONE TABLET BY MOUTH DAILY OR AS INSTRUCTED. ADJUST DOSE BASED ON INR RESULTS AS DIRECTED.       Warfarin Refill Protocol  Failed - 10/13/2019  9:16 AM        Failed -  Route to appropriate pool/provider     Last Anticoagulation Summary:   Anticoagulation Episode Summary     Current INR goal:   2.0-3.0   TTR:   87.9 %   Next INR check:   10/28/2019   INR from last check:   2.70 (9/16/2019)   Weekly max warfarin dose:      Target end date:   Indefinite   INR check location:      Preferred lab:      Send INR reminders to:   LAINE ENRIQUEZ       Comments:            Anticoagulation Care Providers     Provider Role Specialty Phone number    Loli Alberts MD Referring Family Medicine [phone]                Passed - Provider visit in last year     Last office visit with prescriber/PCP: 4/19/2019 Loli Alberts MD OR same dept: 4/19/2019 Loli Alberts MD OR same specialty: 4/19/2019 Loli Alberts MD  Last physical: 9/9/2019 Last MTM visit: Visit date not found    Next appt within 3 mo: Visit date not found Next physical within 3 mo: Visit date not found  Prescriber OR PCP: Loli Alberts MD ([place])  Last diagnosis associated with med order: 1. Chronic atrial fibrillation  - warfarin (COUMADIN/JANTOVEN) 5 MG tablet [Pharmacy Med Name:  WARFARIN SODIUM 5 MG TABLET]; TAKE ONE TABLET BY MOUTH DAILY OR AS INSTRUCTED. ADJUST DOSE BASED ON INR RESULTS AS DIRECTED.  Dispense: 90 tablet; Refill: 1    If protocol passes may refill for 6 months if within 3 months of last provider visit (or a total of 9 months).

## 2021-06-03 VITALS
WEIGHT: 117.8 LBS | DIASTOLIC BLOOD PRESSURE: 104 MMHG | HEART RATE: 59 BPM | RESPIRATION RATE: 16 BRPM | HEIGHT: 64 IN | OXYGEN SATURATION: 98 % | SYSTOLIC BLOOD PRESSURE: 160 MMHG | BODY MASS INDEX: 20.11 KG/M2

## 2021-06-03 VITALS — BODY MASS INDEX: 20.37 KG/M2 | WEIGHT: 122.4 LBS

## 2021-06-03 VITALS — BODY MASS INDEX: 20.42 KG/M2 | WEIGHT: 122.7 LBS

## 2021-06-03 NOTE — TELEPHONE ENCOUNTER
ANTICOAGULATION  MANAGEMENT    Assessment     Today's INR result of 2.7 is Therapeutic (goal INR of 2.0-3.0)        Warfarin taken as previously instructed    No new diet changes affecting INR    No new medication/supplements affecting INR    Continues to tolerate warfarin with no reported s/s of bleeding or thromboembolism     Previous INR was Supratherapeutic    Plan:     Spoke with Elvira regarding INR result and instructed:     Warfarin Dosing Instructions:  Continue current warfarin dose 5 mg daily.    Instructed patient to follow up no later than: 2 weeks.     Education provided: importance of taking warfarin as instructed    Pat verbalizes understanding and agrees to warfarin dosing plan.    Instructed to call the [facility] for any changes, questions or concerns. (#[phone])   ?   Ravi Liriano RN    Subjective/Objective:      Elvira Bush, a 79 y.o. female is on warfarin.     Elvira reports:     Home warfarin dose: verbally confirmed home dose with pt and updated on anticoagulation calendar     Missed doses: No     Medication changes:  No     S/S of bleeding or thromboembolism:  No     New Injury or illness:  No     Changes in diet or alcohol consumption:  No     Upcoming surgery, procedure or cardioversion:  No    Anticoagulation Episode Summary     Current INR goal:   2.0-3.0   TTR:   78.9 %   Next INR check:   11/27/2019   INR from last check:   2.70 (11/13/2019)   Weekly max warfarin dose:      Target end date:   Indefinite   INR check location:      Preferred lab:      Send INR reminders to:   LAINE ENRIQUEZ       Comments:            Anticoagulation Care Providers     Provider Role Specialty Phone number    Loli Alberts MD Referring Family Medicine [phone]

## 2021-06-03 NOTE — TELEPHONE ENCOUNTER
ANTICOAGULATION  MANAGEMENT    Assessment     Today's INR result of 2.8 is Therapeutic (goal INR of 2.0-3.0)        Warfarin taken as previously instructed    No new diet changes affecting INR    No new medication/supplements affecting INR    Continues to tolerate warfarin with no reported s/s of bleeding or thromboembolism     Previous INR was Therapeutic    Plan:     Spoke with Elvira regarding INR result and instructed:     Warfarin Dosing Instructions:  Continue current warfarin dose 5 mg daily   Instructed patient to follow up no later than: 4 weeks    Education provided: importance of therapeutic range    Elvira verbalizes understanding and agrees to warfarin dosing plan.    Instructed to call the [facility] for any changes, questions or concerns. (#[phone])   ?   Kathi Ibqal RN    Subjective/Objective:      Elvira Bush, a 79 y.o. female is on warfarin.     Elvira reports:     Home warfarin dose: as updated on anticoagulation calendar per template     Missed doses: No     Medication changes:  No     S/S of bleeding or thromboembolism:  No     New Injury or illness:  No     Changes in diet or alcohol consumption:  No     Upcoming surgery, procedure or cardioversion:  No    Anticoagulation Episode Summary     Current INR goal:   2.0-3.0   TTR:   78.9 % (1 y)   Next INR check:   12/24/2019   INR from last check:   2.80 (11/26/2019)   Weekly max warfarin dose:      Target end date:   Indefinite   INR check location:      Preferred lab:      Send INR reminders to:   LAINE ENRIQUEZ       Comments:            Anticoagulation Care Providers     Provider Role Specialty Phone number    Loli Alberts MD Referring Family Medicine [phone]

## 2021-06-04 VITALS
WEIGHT: 111.4 LBS | TEMPERATURE: 97.7 F | DIASTOLIC BLOOD PRESSURE: 83 MMHG | HEART RATE: 83 BPM | BODY MASS INDEX: 19.73 KG/M2 | OXYGEN SATURATION: 95 % | SYSTOLIC BLOOD PRESSURE: 133 MMHG

## 2021-06-04 VITALS
SYSTOLIC BLOOD PRESSURE: 118 MMHG | HEART RATE: 74 BPM | HEIGHT: 63 IN | OXYGEN SATURATION: 98 % | DIASTOLIC BLOOD PRESSURE: 72 MMHG | BODY MASS INDEX: 19.49 KG/M2 | WEIGHT: 110 LBS

## 2021-06-04 VITALS
HEIGHT: 63 IN | SYSTOLIC BLOOD PRESSURE: 110 MMHG | DIASTOLIC BLOOD PRESSURE: 62 MMHG | RESPIRATION RATE: 16 BRPM | WEIGHT: 110.31 LBS | OXYGEN SATURATION: 96 % | BODY MASS INDEX: 19.55 KG/M2 | HEART RATE: 80 BPM

## 2021-06-04 VITALS
DIASTOLIC BLOOD PRESSURE: 82 MMHG | SYSTOLIC BLOOD PRESSURE: 130 MMHG | OXYGEN SATURATION: 98 % | WEIGHT: 120.5 LBS | BODY MASS INDEX: 21.01 KG/M2 | HEART RATE: 79 BPM

## 2021-06-04 NOTE — TELEPHONE ENCOUNTER
ANTICOAGULATION  MANAGEMENT    Assessment     Today's INR result of 2.9 is Therapeutic (goal INR of 2.0-3.0)        Warfarin taken as previously instructed    No new diet changes affecting INR    No new medication/supplements affecting INR    Continues to tolerate warfarin with no reported s/s of bleeding or thromboembolism     Previous INR was Therapeutic    Plan:     Spoke with Elvira regarding INR result and instructed:     Warfarin Dosing Instructions:  Continue current warfarin dose 5 mg daily  Instructed patient to follow up no later than: 4 weeks    Education provided: importance of therapeutic range    Elvira verbalizes understanding and agrees to warfarin dosing plan.    Instructed to call the [facility] for any changes, questions or concerns. (#[phone])   ?   Kathi Iqbal RN    Subjective/Objective:      Elvira Bush, a 79 y.o. female is on warfarin.     Elvira reports:     Home warfarin dose: as updated on anticoagulation calendar per template     Missed doses: No     Medication changes:  No     S/S of bleeding or thromboembolism:  No     New Injury or illness:  No     Changes in diet or alcohol consumption:  No     Upcoming surgery, procedure or cardioversion:  No    Anticoagulation Episode Summary     Current INR goal:   2.0-3.0   TTR:   82.4 % (1 y)   Next INR check:   1/17/2020   INR from last check:   2.90 (12/20/2019)   Weekly max warfarin dose:      Target end date:   Indefinite   INR check location:      Preferred lab:      Send INR reminders to:   LAINE ENRIQUEZ       Comments:            Anticoagulation Care Providers     Provider Role Specialty Phone number    Loli Alberts MD Referring Family Medicine [phone]

## 2021-06-04 NOTE — PROGRESS NOTES
SUBJECTIVE: Elvira Bush is a 79 y.o. White or  female who presents today for her 6-month med check.  I last saw her in April for a med check but she was seen in September for a cataract preop.  Her cataract surgery went well.  She has a history of atrial fibrillation and is being anticoagulated with warfarin.  She has hypertension which is controlled.  She has treated hyperlipidemia.  She has seizure disorder and is on Keppra prophylactically.  She really has only had one episode of seizure as far as I remember.  She tolerates this medication well and so is continuing with it.  She has osteopenia and just had her DEXA scan done.  She has already had her flu shot.  She needs monitoring labs and an INR today.  She has no complaints and feels she is doing well.    OBJECTIVE: /82 (Patient Site: Right Arm, Patient Position: Sitting, Cuff Size: Adult Regular)   Pulse 79 Comment: irregular  Wt 120 lb 8 oz (54.7 kg)   SpO2 98%   Breastfeeding No   BMI 21.01 kg/m    General: Well-appearing, well-dressed normal weight octogenarian in no acute distress  Heart: Irregularly irregular  Lungs: Clear bilaterally  Abdomen: Soft  Extremities: Warm, dry and without edema    ASSESSMENT & PLAN:     1. Chronic atrial fibrillation  On warfarin and needing monitoring.  This has been stable.  - INR    2. Essential hypertension  Controlled, needs monitoring lab.  - Basic Metabolic Panel    3. Essential Hypercholesterolemia  Will check her cholesterol.  - Lipid Cascade    4. Osteopenia  DEXA scan was done last month showing osteopenia.    5. Convulsions, unspecified convulsion type (H)  On Keppra and stable.  History of seizure secondary to damage in brain do to previous stroke.    I will get back to her on her lab results by mail and only call with grossly abnormal values.  I will refill her meds as needed.  If all is well I will see her back in 6 months time.    Patient Active Problem List   Diagnosis      Osteopenia     Esophageal reflux     Breast cancer of lower-outer quadrant of left female breast (H)     Essential Hypercholesterolemia     Essential hypertension     Obesity     Headache     Meningioma (H)     Chronic atrial fibrillation     Convulsions, unspecified convulsion type (H)     Encephalomalacia on imaging study     Localization-related focal epilepsy with simple partial seizures (H)     History of left breast cancer       Current Outpatient Medications on File Prior to Visit   Medication Sig Dispense Refill     aspirin 81 MG tablet Take 81 mg by mouth daily.       atenolol (TENORMIN) 100 MG tablet TAKE 1 TABLET BY MOUTH EVERY DAY 90 tablet 2     atorvastatin (LIPITOR) 20 MG tablet TAKE 1 TABLET BY MOUTH EVERYDAY AT BEDTIME 90 tablet 3     calcium-vitamin D (CALCIUM-VITAMIN D) 500 mg(1,250mg) -200 unit per tablet Take 2 tablets by mouth in the morning and 1 tablet by mouth at night.             hydroCHLOROthiazide (HYDRODIURIL) 25 MG tablet TAKE 1 TABLET BY MOUTH EVERY DAY 90 tablet 3     levETIRAcetam (KEPPRA) 750 MG tablet Take 750 mg by mouth 2 (two) times a day.       magnesium oxide (MAG-OX) 400 mg tablet Take 1 tablet (400 mg total) by mouth daily.  0     multivitamin (MULTIVITAMIN) per tablet Take 1 tablet by mouth daily.       omeprazole (PRILOSEC) 20 MG capsule TAKE 1 CAPSULE BY MOUTH EVERY DAY 90 capsule 3     warfarin (COUMADIN/JANTOVEN) 5 MG tablet TAKE ONE TABLET BY MOUTH DAILY OR AS INSTRUCTED. ADJUST DOSE BASED ON INR RESULTS AS DIRECTED. 90 tablet 1     No current facility-administered medications on file prior to visit.

## 2021-06-05 VITALS
BODY MASS INDEX: 20.38 KG/M2 | DIASTOLIC BLOOD PRESSURE: 84 MMHG | WEIGHT: 115 LBS | OXYGEN SATURATION: 98 % | HEIGHT: 63 IN | HEART RATE: 73 BPM | RESPIRATION RATE: 20 BRPM | SYSTOLIC BLOOD PRESSURE: 142 MMHG | TEMPERATURE: 98.3 F

## 2021-06-05 VITALS
HEART RATE: 53 BPM | SYSTOLIC BLOOD PRESSURE: 170 MMHG | OXYGEN SATURATION: 97 % | BODY MASS INDEX: 20.55 KG/M2 | WEIGHT: 116 LBS | DIASTOLIC BLOOD PRESSURE: 88 MMHG

## 2021-06-05 VITALS — HEIGHT: 63 IN | WEIGHT: 114.5 LBS | BODY MASS INDEX: 20.29 KG/M2

## 2021-06-05 NOTE — TELEPHONE ENCOUNTER
ANTICOAGULATION  MANAGEMENT    Assessment     Today's INR result of 3.0 is Therapeutic (goal INR of 2.0-3.0)        Warfarin taken as previously instructed    No new diet changes affecting INR    No new medication/supplements affecting INR    Continues to tolerate warfarin with no reported s/s of bleeding or thromboembolism     Previous INR was Supratherapeutic    Plan:     Spoke with Elvira regarding INR result and instructed:     Warfarin Dosing Instructions:  Continue current warfarin dose 5 mg   (0 % change)    Instructed patient to follow up no later than: 2 weeks appointment made.    Education provided: importance of consistent vitamin K intake, impact of vitamin K foods on INR, importance of therapeutic range and target INR goal and significance of current INR result    Elvira verbalizes understanding and agrees to warfarin dosing plan.    Instructed to call the [facility] for any changes, questions or concerns. (#[phone])   ?   Santa Shultz RN    Subjective/Objective:      Elvira Bush, a 79 y.o. female is on warfarin.     Elvira reports:     Home warfarin dose: as updated on anticoagulation calendar per template     Missed doses: No     Medication changes:  No     S/S of bleeding or thromboembolism:  No     New Injury or illness:  No     Changes in diet or alcohol consumption:  No     Upcoming surgery, procedure or cardioversion:  No    Anticoagulation Episode Summary     Current INR goal:   2.0-3.0   TTR:   76.8 % (1 y)   Next INR check:   2/14/2020   INR from last check:   3.00 (1/31/2020)   Weekly max warfarin dose:      Target end date:   Indefinite   INR check location:      Preferred lab:      Send INR reminders to:   LAINE ENRIQUEZ       Comments:            Anticoagulation Care Providers     Provider Role Specialty Phone number    Loli Alberts MD Referring Family Medicine [phone]

## 2021-06-05 NOTE — TELEPHONE ENCOUNTER
ANTICOAGULATION  MANAGEMENT    Assessment     Today's INR result of 3.3 is Supratherapeutic (goal INR of 2.0-3.0)        Warfarin taken as previously instructed    No new diet changes affecting INR     Had a head cold last week    No new medication/supplements affecting INR    Continues to tolerate warfarin with no reported s/s of bleeding or thromboembolism     Previous INR was Therapeutic    Plan:     Spoke with Elvira regarding INR result and instructed:     Warfarin Dosing Instructions:  Take 2.5 mg today only then continue current warfarin dose 5 mg daily  (0 % change)    Instructed patient to follow up no later than: 2 weeks    Education provided: target INR goal and significance of current INR result, importance of following up for INR monitoring at instructed interval, importance of taking warfarin as instructed, importance of notifying clinic for changes in medications and importance of notifying clinic for diarrhea, nausea/vomiting, reduced intake and/or illness    Pat verbalizes understanding and agrees to warfarin dosing plan.    Instructed to call the [facility] for any changes, questions or concerns. (#[phone])   ?   Chanell Ferreira RN    Subjective/Objective:      Elvira Bush, a 79 y.o. female is on warfarin.     Elvira reports:     Home warfarin dose: verbally confirmed home dose with Pat and updated on anticoagulation calendar     Missed doses: No     Medication changes:  No     S/S of bleeding or thromboembolism:  No     New Injury or illness:  Yes: had a head cold last week     Changes in diet or alcohol consumption:  No     Upcoming surgery, procedure or cardioversion:  No    Anticoagulation Episode Summary     Current INR goal:   2.0-3.0   TTR:   80.6 % (1 y)   Next INR check:   1/31/2020   INR from last check:   3.30! (1/17/2020)   Weekly max warfarin dose:      Target end date:   Indefinite   INR check location:      Preferred lab:      Send INR reminders to:   [facility]       Comments:            Anticoagulation Care Providers     Provider Role Specialty Phone number    Loli Alberts MD Referring Family Medicine [phone]

## 2021-06-06 NOTE — TELEPHONE ENCOUNTER
Refill Approved    Rx renewed per Medication Renewal Policy. Medication was last renewed on 2/26/19.    Shereen A Andres, [facility] Triage/Med Refill 2/12/2020     Requested Prescriptions   Pending Prescriptions Disp Refills     omeprazole (PRILOSEC) 20 MG capsule [Pharmacy Med Name: OMEPRAZOLE DR 20 MG CAPSULE] 90 capsule 3     Sig: TAKE 1 CAPSULE BY MOUTH EVERY DAY       GI Medications Refill Protocol Passed - 2/9/2020  9:47 AM        Passed - PCP or prescribing provider visit in last 12 or next 3 months.     Last office visit with prescriber/PCP: 12/20/2019 Loli Alberts MD OR same dept: 12/20/2019 Loli Alberts MD OR same specialty: 12/20/2019 Loli Alberts MD  Last physical: 9/9/2019 Last MTM visit: Visit date not found   Next visit within 3 mo: Visit date not found  Next physical within 3 mo: Visit date not found  Prescriber OR PCP: Carlos) RASTA Alberts MD  Last diagnosis associated with med order: 1. Gastroesophageal reflux disease without esophagitis  - omeprazole (PRILOSEC) 20 MG capsule [Pharmacy Med Name: OMEPRAZOLE DR 20 MG CAPSULE]; TAKE 1 CAPSULE BY MOUTH EVERY DAY  Dispense: 90 capsule; Refill: 3    2. Essential Hypercholesterolemia  - atorvastatin (LIPITOR) 20 MG tablet [Pharmacy Med Name: ATORVASTATIN 20 MG TABLET]; TAKE 1 TABLET BY MOUTH EVERYDAY AT BEDTIME  Dispense: 90 tablet; Refill: 3    3. Essential hypertension  - hydroCHLOROthiazide (HYDRODIURIL) 25 MG tablet [Pharmacy Med Name: HYDROCHLOROTHIAZIDE 25 MG TAB]; TAKE 1 TABLET BY MOUTH EVERY DAY  Dispense: 90 tablet; Refill: 3    If protocol passes may refill for 12 months if within 3 months of last provider visit (or a total of 15 months).             atorvastatin (LIPITOR) 20 MG tablet [Pharmacy Med Name: ATORVASTATIN 20 MG TABLET] 90 tablet 3     Sig: TAKE 1 TABLET BY MOUTH EVERYDAY AT BEDTIME       Statins Refill Protocol (Hmg CoA Reductase Inhibitors) Passed - 2/9/2020  9:47 AM        Passed - PCP or  prescribing provider visit in past 12 months      Last office visit with prescriber/PCP: 12/20/2019 Loli Alberts MD OR same dept: 12/20/2019 Loli Alberts MD OR same specialty: 12/20/2019 Loli Alberts MD  Last physical: 9/9/2019 Last MTM visit: Visit date not found   Next visit within 3 mo: Visit date not found  Next physical within 3 mo: Visit date not found  Prescriber OR PCP: Loli Alberts MD ([facility])  Last diagnosis associated with med order: 1. Gastroesophageal reflux disease without esophagitis  - omeprazole (PRILOSEC) 20 MG capsule [Pharmacy Med Name: OMEPRAZOLE DR 20 MG CAPSULE]; TAKE 1 CAPSULE BY MOUTH EVERY DAY  Dispense: 90 capsule; Refill: 3    2. Essential Hypercholesterolemia  - atorvastatin (LIPITOR) 20 MG tablet [Pharmacy Med Name: ATORVASTATIN 20 MG TABLET]; TAKE 1 TABLET BY MOUTH EVERYDAY AT BEDTIME  Dispense: 90 tablet; Refill: 3    3. Essential hypertension  - hydroCHLOROthiazide (HYDRODIURIL) 25 MG tablet [Pharmacy Med Name: HYDROCHLOROTHIAZIDE 25 MG TAB]; TAKE 1 TABLET BY MOUTH EVERY DAY  Dispense: 90 tablet; Refill: 3    If protocol passes may refill for 12 months if within 3 months of last provider visit (or a total of 15 months).             hydroCHLOROthiazide (HYDRODIURIL) 25 MG tablet [Pharmacy Med Name: HYDROCHLOROTHIAZIDE 25 MG TAB] 90 tablet 3     Sig: TAKE 1 TABLET BY MOUTH EVERY DAY       Diuretics/Combination Diuretics Refill Protocol  Passed - 2/9/2020  9:47 AM        Passed - Visit with PCP or prescribing provider visit in past 12 months     Last office visit with prescriber/PCP: 12/20/2019 Loli Alberts MD OR same dept: 12/20/2019 Loli Alberts MD OR same specialty: 12/20/2019 Loli Alberts MD  Last physical: 9/9/2019 Last MTM visit: Visit date not found   Next visit within 3 mo: Visit date not found  Next physical within 3 mo: Visit date not found  Prescriber OR PCP: Loli Alberts MD ([place])  Last diagnosis  associated with med order: 1. Gastroesophageal reflux disease without esophagitis  - omeprazole (PRILOSEC) 20 MG capsule [Pharmacy Med Name: OMEPRAZOLE DR 20 MG CAPSULE]; TAKE 1 CAPSULE BY MOUTH EVERY DAY  Dispense: 90 capsule; Refill: 3    2. Essential Hypercholesterolemia  - atorvastatin (LIPITOR) 20 MG tablet [Pharmacy Med Name: ATORVASTATIN 20 MG TABLET]; TAKE 1 TABLET BY MOUTH EVERYDAY AT BEDTIME  Dispense: 90 tablet; Refill: 3    3. Essential hypertension  - hydroCHLOROthiazide (HYDRODIURIL) 25 MG tablet [Pharmacy Med Name: HYDROCHLOROTHIAZIDE 25 MG TAB]; TAKE 1 TABLET BY MOUTH EVERY DAY  Dispense: 90 tablet; Refill: 3    If protocol passes may refill for 12 months if within 3 months of last provider visit (or a total of 15 months).             Passed - Serum Potassium in past 12 months      Lab Results   Component Value Date    Potassium 4.4 12/20/2019             Passed - Serum Sodium in past 12 months      Lab Results   Component Value Date    Sodium 144 12/20/2019             Passed - Blood pressure on file in past 12 months     BP Readings from Last 1 Encounters:   12/20/19 130/82             Passed - Serum Creatinine in past 12 months      Creatinine   Date Value Ref Range Status   12/20/2019 0.97 0.60 - 1.10 mg/dL Final

## 2021-06-06 NOTE — TELEPHONE ENCOUNTER
Pharmacy calling to check on status of refills- reviewed that they are currently in process to be filled.     Shereen Campos RN [facility] Triage/Med Refill 2/12/2020 9:31 AM

## 2021-06-06 NOTE — TELEPHONE ENCOUNTER
ANTICOAGULATION  MANAGEMENT    Assessment     Today's INR result of 2.7 is Therapeutic (goal INR of 2.0-3.0)        Warfarin taken as previously instructed    No new diet changes affecting INR    No new medication/supplements affecting INR    Continues to tolerate warfarin with no reported s/s of bleeding or thromboembolism     Previous INR was Therapeutic    Plan:     Spoke with Elvira regarding INR result and instructed:     Warfarin Dosing Instructions:  Continue current warfarin dose 5 mg daily  (0 % change)    Instructed patient to follow up no later than: 4 weeks     Education provided: importance of therapeutic range, importance of following up for INR monitoring at instructed interval and importance of taking warfarin as instructed    Pat verbalizes understanding and agrees to warfarin dosing plan.    Instructed to call the [facility] for any changes, questions or concerns. (#[phone])   ?   Agueda Pettit RN    Subjective/Objective:      Elvira Bush, a 79 y.o. female is on warfarin.     Elvira reports:     Home warfarin dose: verbally confirmed home dose with Pat  and updated on anticoagulation calendar     Missed doses: No     Medication changes:  No     S/S of bleeding or thromboembolism:  No     New Injury or illness:  No     Changes in diet or alcohol consumption:  No     Upcoming surgery, procedure or cardioversion:  No    Anticoagulation Episode Summary     Current INR goal:   2.0-3.0   TTR:   76.8 % (1 y)   Next INR check:   3/12/2020   INR from last check:   2.70 (2/13/2020)   Weekly max warfarin dose:      Target end date:   Indefinite   INR check location:      Preferred lab:      Send INR reminders to:   LAINE ENRIQUEZ       Comments:            Anticoagulation Care Providers     Provider Role Specialty Phone number    Loli Alberts MD Referring Family Medicine [phone]

## 2021-06-07 NOTE — TELEPHONE ENCOUNTER
RN cannot approve Refill Request    RN can NOT refill this medication med is not covered by policy/route to provider. Last office visit: 12/20/2019 Loli Alberts MD Last Physical: 9/9/2019 Last MTM visit: Visit date not found Last visit same specialty: 12/20/2019 Loli Alberts MD.  Next visit within 3 mo: Visit date not found  Next physical within 3 mo: Visit date not found      Carmela Covarrubias, [facility] Triage/Med Refill 4/4/2020    Requested Prescriptions   Pending Prescriptions Disp Refills     warfarin ANTICOAGULANT (COUMADIN/JANTOVEN) 5 MG tablet [Pharmacy Med Name: WARFARIN SODIUM 5 MG TABLET] 90 tablet 1     Sig: TAKE ONE TABLET BY MOUTH DAILY OR AS INSTRUCTED. ADJUST DOSE BASED ON INR RESULTS AS DIRECTED.       Warfarin Refill Protocol  Failed - 4/4/2020  9:24 AM        Failed -  Route to appropriate pool/provider     Last Anticoagulation Summary:   Anticoagulation Episode Summary     Current INR goal:   2.0-3.0   TTR:   69.1 % (11.9 mo)   Next INR check:   4/9/2020   INR from last check:   3.20! (3/26/2020)   Weekly max warfarin dose:      Target end date:   Indefinite   INR check location:      Preferred lab:      Send INR reminders to:   LAINE ENRIQUEZ       Comments:            Anticoagulation Care Providers     Provider Role Specialty Phone number    Loli Alberts MD Referring Family Medicine [phone]                Passed - Provider visit in last year     Last office visit with prescriber/PCP: 12/20/2019 Loli Alberts MD OR same dept: 12/20/2019 Loli Alberts MD OR same specialty: 12/20/2019 Loli Alberts MD  Last physical: 9/9/2019 Last MTM visit: Visit date not found    Next appt within 3 mo: Visit date not found Next physical within 3 mo: Visit date not found  Prescriber OR PCP: Loli Alberts MD ([place])  Last diagnosis associated with med order: 1. Chronic atrial fibrillation  - warfarin ANTICOAGULANT  (COUMADIN/JANTOVEN) 5 MG tablet [Pharmacy Med Name: WARFARIN SODIUM 5 MG TABLET]; TAKE ONE TABLET BY MOUTH DAILY OR AS INSTRUCTED. ADJUST DOSE BASED ON INR RESULTS AS DIRECTED.  Dispense: 90 tablet; Refill: 1    If protocol passes may refill for 6 months if within 3 months of last provider visit (or a total of 9 months).

## 2021-06-07 NOTE — TELEPHONE ENCOUNTER
ANTICOAGULATION  MANAGEMENT    Assessment     Today's INR result of 2.0 is Therapeutic (goal INR of 2.0-3.0)        Warfarin taken as previously instructed    No new diet changes affecting INR    No new medication/supplements affecting INR    Continues to tolerate warfarin with no reported s/s of bleeding or thromboembolism     Previous INR was Subtherapeutic    Plan:     Spoke with Elvira regarding INR result and instructed:     Warfarin Dosing Instructions:  Continue current warfarin dose 2.5 mg daily on Mondays; and 5 mg daily rest of week  (0 % change)    Instructed patient to follow up no later than: 2 weeks     Education provided: importance of consistent vitamin K intake, vitamin K content of foods, importance of therapeutic range, target INR goal and significance of current INR result and importance of taking warfarin as instructed    Pat verbalizes understanding and agrees to warfarin dosing plan.    Instructed to call the [facility] for any changes, questions or concerns. (#[phone])   ?   Agueda Pettit RN    Subjective/Objective:      Elvira Bush, a 80 y.o. female is on warfarin.     Elvira reports:     Home warfarin dose: verbally confirmed home dose with Pat and updated on anticoagulation calendar     Missed doses: No     Medication changes:  No     S/S of bleeding or thromboembolism:  No     New Injury or illness:  No     Changes in diet or alcohol consumption:  No     Upcoming surgery, procedure or cardioversion:  No    Anticoagulation Episode Summary     Current INR goal:   2.0-3.0   TTR:   65.2 % (1 y)   Next INR check:   5/7/2020   INR from last check:   2.00 (4/23/2020)   Weekly max warfarin dose:      Target end date:   Indefinite   INR check location:      Preferred lab:      Send INR reminders to:   LAINE ENRIQUEZ       Comments:            Anticoagulation Care Providers     Provider Role Specialty Phone number    Loli Alberts MD Referring Family Medicine  [phone]

## 2021-06-07 NOTE — TELEPHONE ENCOUNTER
ANTICOAGULATION  MANAGEMENT    Assessment     Today's INR result of 1.9 is Subtherapeutic (goal INR of 2.0-3.0)        Warfarin taken as previously instructed    No new diet changes affecting INR    No new medication/supplements affecting INR    Continues to tolerate warfarin with no reported s/s of bleeding or thromboembolism     Previous INR was Supratherapeutic    Plan:     Spoke with Elvira regarding INR result and instructed:     Warfarin Dosing Instructions:  Continue current warfarin dose 2.5 mg daily on Mondays; and 5 mg daily rest of week  (8.3 % change)    Instructed patient to follow up no later than: 2 weeks     Education provided: importance of consistent vitamin K intake, importance of therapeutic range, importance of following up for INR monitoring at instructed interval, importance of taking warfarin as instructed and monitoring for clotting signs and symptoms    Pat verbalizes understanding and agrees to warfarin dosing plan.    Instructed to call the [facility] for any changes, questions or concerns. (#[phone])   ?   Agueda Pettit RN    Subjective/Objective:      Elvira Bush, a 80 y.o. female is on warfarin.     Elvira reports:     Home warfarin dose: verbally confirmed home dose with Pat and updated on anticoagulation calendar     Missed doses: No     Medication changes:  No     S/S of bleeding or thromboembolism:  No     New Injury or illness:  No     Changes in diet or alcohol consumption:  No     Upcoming surgery, procedure or cardioversion:  No    Anticoagulation Episode Summary     Current INR goal:   2.0-3.0   TTR:   69.0 % (1 y)   Next INR check:   4/23/2020   INR from last check:   1.90! (4/9/2020)   Weekly max warfarin dose:      Target end date:   Indefinite   INR check location:      Preferred lab:      Send INR reminders to:   LAINE ENRIQUEZ       Comments:            Anticoagulation Care Providers     Provider Role Specialty Phone number    Loli Alberts  MD [facility] Medicine [phone]

## 2021-06-08 NOTE — TELEPHONE ENCOUNTER
ANTICOAGULATION  MANAGEMENT    Assessment     Today's INR result of 2.2 is Therapeutic (goal INR of 2.0-3.0)        Warfarin taken as previously instructed    No new diet changes affecting INR    No new medication/supplements affecting INR    Continues to tolerate warfarin with no reported s/s of bleeding or thromboembolism     Previous INR was Therapeutic    Plan:     Spoke with Elvira regarding INR result and instructed:     Warfarin Dosing Instructions:  Continue current warfarin dose 2.5 mg daily on Mondays; and 5 mg daily rest of week  (0 % change)    Instructed patient to follow up no later than: 4 weeks     Education provided: importance of therapeutic range, importance of following up for INR monitoring at instructed interval and importance of taking warfarin as instructed    Pat verbalizes understanding and agrees to warfarin dosing plan.    Instructed to call the [facility] for any changes, questions or concerns. (#[phone])   ?   Agueda Pettit RN    Subjective/Objective:      Elvira Bush, a 80 y.o. female is on warfarin.     Elvira reports:     Home warfarin dose: verbally confirmed home dose with Pat and updated on anticoagulation calendar     Missed doses: No     Medication changes:  No     S/S of bleeding or thromboembolism:  No     New Injury or illness:  No     Changes in diet or alcohol consumption:  No     Upcoming surgery, procedure or cardioversion:  No    Anticoagulation Episode Summary     Current INR goal:   2.0-3.0   TTR:   65.2 % (1 y)   Next INR check:   6/4/2020   INR from last check:   2.20 (5/7/2020)   Weekly max warfarin dose:      Target end date:   Indefinite   INR check location:      Preferred lab:      Send INR reminders to:   LAINE ENRIQUEZ       Comments:            Anticoagulation Care Providers     Provider Role Specialty Phone number    Loli Alberts MD Referring Family Medicine [phone]

## 2021-06-08 NOTE — TELEPHONE ENCOUNTER
ANTICOAGULATION  MANAGEMENT    Assessment     Today's INR result of 2.2 is Therapeutic (goal INR of 2.0-3.0)        Warfarin taken as previously instructed    No new diet changes affecting INR    No new medication/supplements affecting INR    Continues to tolerate warfarin with no reported s/s of bleeding or thromboembolism     Previous INR was Therapeutic    Plan:     Left a detailed message for Elvira regarding INR result and instructed:     Warfarin Dosing Instructions:  Continue current warfarin dose 2.5 mg daily on Mondays; and 5 mg daily rest of week  (0 % change)    Instructed patient to follow up no later than: 4 weeks       Instructed to call the [facility] for any changes, questions or concerns. (#[phone])   ?   Agueda Pettit RN    Subjective/Objective:      Elviratahir Bush, a 80 y.o. female is on warfarin.     Elvira reports:     Home warfarin dose: verbally confirmed home dose with Pat  and updated on anticoagulation calendar     Missed doses: No     Medication changes:  No     S/S of bleeding or thromboembolism:  No     New Injury or illness:  No     Changes in diet or alcohol consumption:  No     Upcoming surgery, procedure or cardioversion:  No    Anticoagulation Episode Summary     Current INR goal:   2.0-3.0   TTR:   67.5 % (1 y)   Next INR check:   7/2/2020   INR from last check:   2.20 (6/4/2020)   Weekly max warfarin dose:      Target end date:   Indefinite   INR check location:      Preferred lab:      Send INR reminders to:   LAINE ENRIQUEZ       Comments:            Anticoagulation Care Providers     Provider Role Specialty Phone number    Loli Alberts MD Referring Family Medicine [phone]

## 2021-06-09 NOTE — PROGRESS NOTES
Assessment and Plan:   Return to clinic in 3 months.  Sooner if needed.    1. Medicare annual wellness visit, subsequent    2. Encounter for general adult medical examination with abnormal findings  Patient with recent decrease in appetite, weight loss and elevated liver function enzymes    3. Localization-related focal epilepsy with simple partial seizures (H)  Last level was greater than 96, will recheck this lab.  Patient is currently taking thousand milligrams twice daily.  Patient has had seizures since increasing the dose.  - Levetiracetam [Keppra ]    4. Elevated liver enzymes  Extremely elevated, sudden onset.  Unknown whether it is related to high Keppra level.  - Hepatic Profile    5. Poor appetite  I believe this is due to elevated liver enzymes    6. Chronic atrial fibrillation (H)  Patient was a bit supratherapeutic at last check, will recheck  - INR    7. Essential hypertension  Well-controlled had recent labs    8. Essential Hypercholesterolemia  Well-controlled, recent labs were have been done.        The patient's current medical problems were reviewed.    Patient is here for her annual wellness visit but  The following health maintenance schedule was reviewed with the patient and provided in printed form in the after visit summary:   Health Maintenance   Topic Date Due     MEDICARE ANNUAL WELLNESS VISIT  03/28/2003     FALL RISK ASSESSMENT  04/19/2020     INFLUENZA VACCINE RULE BASED (1) 08/01/2020     DXA SCAN  11/07/2021     ADVANCE CARE PLANNING  04/19/2024     TD 18+ HE  01/08/2026     PNEUMOCOCCAL IMMUNIZATION 65+ LOW/MEDIUM RISK  Completed     ZOSTER VACCINES  Completed        Subjective:   Chief Complaint: Elvira Bush is an 80 y.o. female here for an Annual Wellness visit.   HPI: Patient has a past medical history for well-controlled hypertension, hyperlipidemia, atrial fibrillation on warfarin.  Her problem right now is her seizure disorder which she  developed after she a stroke.   "She is continuing to have seizure as recently as 4 July.  Her neurologist has increased her medication, Keppra, 2000 mg twice daily which put her level at 96.4 nearly double the usual blood level.  This was prior to her having 4 July seizure.  When she had recent labs it was noted that her liver enzymes were very elevated with an alk phos at 767, AST at 180 and ALT at 179.  Total bili was 5.8.  A year prior her liver function tests were completely normal.  She describes her seizures to me.  She likely gets a prodrome of feeling \"cold\" and then she goes down and feels so heavy that she cannot get up and many times needs to have somebody help her.  And then she will sleep for a long time.  She called her neurology office to discuss these issues of labs and her primary neurologist is out during COVID  and she does not know exactly why but she is unavailable.  Covering neurologist did not want to change the medication.  She does have a future EEG scheduled for next Monday, July 20.  We discussed perhaps they could do an additional medication and back off on the Keppra especially if the liver enzymes are related.  I looked up and it is a very rare occurrence that liver enzymes elevate with high Keppra levels.  Hopefully this is not due to a more concerning reason.  We discussed we could do an abdominal ultrasound if recheck of labs show a continuing increased increase.  I asked her what her symptoms are and basically she tells me that she feels great except for the fact that she has absolutely no appetite.  Her mood continues to be pretty good considering everything.  COVID is getting to her because she is feeling isolated and she is a person who usually likes to be on the go and now she is forced to stay at home.  She says she is so jairo to have a ground-floor patio to be outside and then also to be able to visit with family via patio.    Review of Systems:    Review of Systems -   General ROS: negative  Psychological " ROS: negative  Ophthalmic ROS: negative, no icterus  ENT ROS: negative  Allergy and Immunology ROS: negative  Hematological and Lymphatic ROS: negative  Endocrine ROS: negative  Breast ROS: negative  Respiratory ROS: negative  Cardiovascular ROS: negative  Gastrointestinal ROS: positive for - poor appetite  Genito-Urinary ROS: negative  Musculoskeletal ROS: negative  Neurological ROS: positive for - seizures  Dermatological ROS: negative      Patient Care Team:  Loli Alberts MD as PCP - General (Family Medicine)  Shekhar Dominguez MD as Physician (Hematology and Oncology)  Horacio Tomas MD as Physician (Neurology)  Mahi Lopez CNP as Physician (Neurology)  Loli Alberts MD as Assigned PCP     Patient Active Problem List   Diagnosis     Osteopenia     Esophageal reflux     Breast cancer of lower-outer quadrant of left female breast (H)     Essential Hypercholesterolemia     Essential hypertension     Obesity     Headache     Meningioma (H)     Chronic atrial fibrillation (H)     Convulsions, unspecified convulsion type (H)     Encephalomalacia on imaging study     Localization-related focal epilepsy with simple partial seizures (H)     History of left breast cancer     Past Medical History:   Diagnosis Date     Acute respiratory failure, unspecified whether with hypoxia or hypercapnia (H)      Atrial fibrillation (H)      Breast cancer (H) 3-2014     Closed displaced fracture of left pubis, initial encounter (H)      Closed fracture of spinous process of lumbar vertebra (H) 9/9/2017     Encephalopathy      GERD (gastroesophageal reflux disease)      HTN (hypertension)      Hx of radiation therapy 5-2014    Left Breast     Hypercholesterolemia      Intraabdominal hemorrhage 9/9/2017     Osteopenia      Pelvic fracture (H) 9/9/2017     Skin cancer      Transient alteration of awareness       Past Surgical History:   Procedure Laterality Date     BREAST LUMPECTOMY Left 3-2014     KNEE  ARTHROSCOPY Right 2009     [facility]  7/27/2017          SKIN CANCER EXCISION       TENDON RELEASE        Family History   Problem Relation Age of Onset     Coronary artery disease Mother      No Medical Problems Daughter      Stroke Maternal Grandmother      Prostate cancer Maternal Grandfather      Cerebral aneurysm Maternal Aunt      No Medical Problems Paternal Aunt      Skin cancer Son      BRCA 1/2 Neg Hx      Breast cancer Neg Hx      Cancer Neg Hx      Colon cancer Neg Hx      Endometrial cancer Neg Hx      Ovarian cancer Neg Hx       Social History     Socioeconomic History     Marital status:      Spouse name: Not on file     Number of children: Not on file     Years of education: Not on file     Highest education level: Not on file   Occupational History     Not on file   Social Needs     Financial resource strain: Not on file     Food insecurity     Worry: Not on file     Inability: Not on file     Transportation needs     Medical: Not on file     Non-medical: Not on file   Tobacco Use     Smoking status: Never Smoker     Smokeless tobacco: Never Used   Substance and Sexual Activity     Alcohol use: Yes     Alcohol/week: 2.0 standard drinks     Types: 2 Glasses of wine per week     Comment: no alcohol this summer     Drug use: No     Sexual activity: Not on file   Lifestyle     Physical activity     Days per week: Not on file     Minutes per session: Not on file     Stress: Not on file   Relationships     Social connections     Talks on phone: Not on file     Gets together: Not on file     Attends Yazdanism service: Not on file     Active member of club or organization: Not on file     Attends meetings of clubs or organizations: Not on file     Relationship status: Not on file     Intimate partner violence     Fear of current or ex partner: Not on file     Emotionally abused: Not on file     Physically abused: Not on file     Forced sexual activity: Not on file   Other Topics Concern     Not on file  "  Social History Narrative     Not on file      Current Outpatient Medications   Medication Sig Dispense Refill     aspirin 81 MG tablet Take 81 mg by mouth daily.       atenoloL (TENORMIN) 100 MG tablet TAKE 1 TABLET BY MOUTH EVERY DAY 90 tablet 1     atorvastatin (LIPITOR) 20 MG tablet TAKE 1 TABLET BY MOUTH EVERYDAY AT BEDTIME 90 tablet 3     calcium-vitamin D (CALCIUM-VITAMIN D) 500 mg(1,250mg) -200 unit per tablet Take 2 tablets by mouth in the morning and 1 tablet by mouth at night.             hydroCHLOROthiazide (HYDRODIURIL) 25 MG tablet TAKE 1 TABLET BY MOUTH EVERY DAY 90 tablet 3     levETIRAcetam (KEPPRA) 500 MG tablet Take 1,000 mg by mouth.       magnesium oxide (MAG-OX) 400 mg tablet Take 1 tablet (400 mg total) by mouth daily.  0     multivitamin (MULTIVITAMIN) per tablet Take 1 tablet by mouth daily.       omeprazole (PRILOSEC) 20 MG capsule TAKE 1 CAPSULE BY MOUTH EVERY DAY 90 capsule 3     warfarin ANTICOAGULANT (COUMADIN/JANTOVEN) 5 MG tablet Take 1/2-1 tablet (2.5-5 mg) daily as directed. Adjust dose per INR results. 90 tablet 1     levETIRAcetam (KEPPRA) 750 MG tablet Take 750 mg by mouth 2 (two) times a day.       No current facility-administered medications for this visit.       Objective:   Vital Signs:   Visit Vitals  /72   Pulse 74   Ht 5' 3\" (1.6 m)   Wt 110 lb (49.9 kg)   SpO2 98%   Breastfeeding No   BMI 19.49 kg/m           VisionScreening:  No exam data present     PHYSICAL EXAM  General appearance - alert, well appearing,thin and in no distress  Mental status - normal mood, behavior, speech, dress, motor activity, and thought processes  Eyes - pupils equal and reactive, extraocular eye movements intact  Ears - bilateral TM's and external ear canals normal  Nose - normal and patent, no erythema, discharge or polyps  Mouth - mucous membranes moist, pharynx normal without lesions  Neck - supple, no significant adenopathy, carotids upstroke normal bilaterally, no bruits, thyroid " exam: thyroid is normal in size without nodules or tenderness  Lymphatics - no palpable lymphadenopathy, no hepatosplenomegaly  Chest - clear to auscultation, no wheezes, rales or rhonchi, symmetric air entry  Heart - normal rate and regular rhythm, S1 and S2 normal  Abdomen - soft, nontender, nondistended, no masses or organomegaly  Breasts - not examined  Pelvic - examination not indicated  Back exam - full range of motion, no tenderness, palpable spasm or pain on motion  Neurological - alert, oriented, normal speech, no focal findings or movement disorder noted, cranial nerves II through XII intact, DTR's normal and symmetric  Musculoskeletal - no joint tenderness, deformity or swelling  Extremities - peripheral pulses normal, no pedal edema, no clubbing or cyanosis  Skin - normal coloration and turgor, no rashes, no suspicious skin lesions noted      Assessment Results 7/14/2020   Activities of Daily Living No help needed   Instrumental Activities of Daily Living No help needed   Mini Cog Total Score 5   Some recent data might be hidden     A Mini-Cog score of 0-2 suggests the possibility of dementia, score of 3-5 suggests no dementia  Fall Risk screen done, see flowsheet  Cognitive Screen done, see flowsheet    Identified Health Risks:     The patient was counseled and encouraged to consider modifying their diet and eating habits. She was provided with information on recommended healthy diet options.  Patient's advanced directive was discussed and I am comfortable with the patient's wishes.

## 2021-06-09 NOTE — TELEPHONE ENCOUNTER
ANTICOAGULATION  MANAGEMENT    Assessment     Today's INR result of 3.74 is Supratherapeutic (goal INR of 2.0-3.0)        Warfarin taken as previously instructed    Lack of appetitte  may be affecting diet and INR    Interaction between tylenol  and warfarin may be affecting INR - was using for an upset stomach     Continues to tolerate warfarin with no reported s/s of bleeding or thromboembolism     Previous INR was Therapeutic    Plan:     Spoke with Elvira regarding INR result and instructed:     Warfarin Dosing Instructions:  Hold warfarin tonight,  then continue current warfarin dose 2.5 mg daily on Mondays; and 5 mg daily rest of week  (0 % change)    Instructed patient to follow up no later than: 2 weeks     Education provided: importance of consistent vitamin K intake, importance of therapeutic range, importance of following up for INR monitoring at instructed interval, importance of taking warfarin as instructed, monitoring for bleeding signs and symptoms and importance of notifying clinic for diarrhea, nausea/vomiting, reduced intake and/or illness, discussed eating smaller meals to help with appetite     Kym verbalizes understanding and agrees to warfarin dosing plan.    Instructed to call the [facility] for any changes, questions or concerns. (#[phone])   ?   Agueda Pettit RN    Subjective/Objective:      Elvira Bush, a 80 y.o. female is on warfarin.     Elvira reports:     Home warfarin dose: verbally confirmed home dose with Kym  and updated on anticoagulation calendar     Missed doses: No     Medication changes:  Yes: tylenol intake, not ongoing      S/S of bleeding or thromboembolism:  No     New Injury or illness:  No     Changes in diet or alcohol consumption:  Yes: decrease appetite      Upcoming surgery, procedure or cardioversion:  No    Anticoagulation Episode Summary     Current INR goal:   2.0-3.0   TTR:   65.5 % (1 y)   Next INR check:   7/14/2020   INR from last check:    3.74! (6/30/2020)   Weekly max warfarin dose:      Target end date:   Indefinite   INR check location:      Preferred lab:      Send INR reminders to:   LAINE ENRIQUEZ       Comments:            Anticoagulation Care Providers     Provider Role Specialty Phone number    Loli Alberts MD Referring Family Medicine [phone]

## 2021-06-09 NOTE — TELEPHONE ENCOUNTER
"ANTICOAGULATION  MANAGEMENT    Assessment     Today's INR result of 4.3 is Supratherapeutic (goal INR of 2.0-3.0)        Warfarin taken as previously instructed    Various, sometimes vitamin K other times none  may be affecting diet and INR - patient has a poor appetite, she has been dealing with Keppra intolerance and has not been eating well. Reports having to \"force\" herself to eat something. Patient also reports does not have reliable transportation to obtain same amount of vitamin K in diet     No new medication/supplements affecting INR    Continues to tolerate warfarin with no reported s/s of bleeding or thromboembolism     Previous INR was Supratherapeutic    Plan:     Spoke with Elvira regarding INR result and instructed:     Warfarin Dosing Instructions:  Hold warfarin tonight,  then change warfarin dose to 5 mg daily on Sundays, Tuesdays and Fridays; and 2.5 mg daily rest of week  (9.1 % change)    Closest change to protocol of 10%   She will have a servings of greens tonight       Instructed patient to follow up no later than: 7-10 days, scheduled 7/28    Education provided: importance of therapeutic range, importance of following up for INR monitoring at instructed interval, importance of taking warfarin as instructed, monitoring for bleeding signs and symptoms and when to seek medical attention/emergency care    Pat verbalizes understanding and agrees to warfarin dosing plan.    Instructed to call the [facility] for any changes, questions or concerns. (#[phone])   ?   Agueda Pettit RN    Subjective/Objective:      Elvira Bush, a 80 y.o. female is on warfarin.     Elvira reports:     Home warfarin dose: verbally confirmed home dose with Pat and updated on anticoagulation calendar     Missed doses: No     Medication changes:  No     S/S of bleeding or thromboembolism:  No     New Injury or illness:  No     Changes in diet or alcohol consumption:  Yes, ongoing poor appetite      Upcoming " surgery, procedure or cardioversion:  No    Anticoagulation Episode Summary     Current INR goal:   2.0-3.0   TTR:   59.7 % (1 y)   Next INR check:   7/28/2020   INR from last check:   4.30! (7/21/2020)   Weekly max warfarin dose:      Target end date:   Indefinite   INR check location:      Preferred lab:      Send INR reminders to:   LAINE ENRIQUEZ       Comments:            Anticoagulation Care Providers     Provider Role Specialty Phone number    Loli Alberts MD Referring Family Medicine [phone]

## 2021-06-09 NOTE — PROGRESS NOTES
Pt arrived at [facility] to see Nabor BARAHONA. Pt has Breast Cancer and is here for f/u.

## 2021-06-09 NOTE — TELEPHONE ENCOUNTER
Refill Approved    Rx renewed per Medication Renewal Policy. Medication was last renewed on 10/4/19.    Margareth Huizar, [facility] Triage/Med Refill 6/21/2020     Requested Prescriptions   Pending Prescriptions Disp Refills     atenoloL (TENORMIN) 100 MG tablet [Pharmacy Med Name: ATENOLOL 100 MG TABLET] 90 tablet 2     Sig: TAKE 1 TABLET BY MOUTH EVERY DAY       Beta-Blockers Refill Protocol Passed - 6/20/2020  9:20 AM        Passed - PCP or prescribing provider visit in past 12 months or next 3 months     Last office visit with prescriber/PCP: 12/20/2019 Loli Alberts MD OR same dept: 12/20/2019 Loli Alberts MD OR same specialty: 12/20/2019 Loli Alberts MD  Last physical: 9/9/2019 Last MTM visit: Visit date not found   Next visit within 3 mo: Visit date not found  Next physical within 3 mo: Visit date not found  Prescriber OR PCP: Carlos) RASTA Alberts MD  Last diagnosis associated with med order: 1. Essential hypertension  - atenoloL (TENORMIN) 100 MG tablet [Pharmacy Med Name: ATENOLOL 100 MG TABLET]; TAKE 1 TABLET BY MOUTH EVERY DAY  Dispense: 90 tablet; Refill: 2    If protocol passes may refill for 12 months if within 3 months of last provider visit (or a total of 15 months).             Passed - Blood pressure filed in past 12 months     BP Readings from Last 1 Encounters:   12/20/19 130/82

## 2021-06-09 NOTE — TELEPHONE ENCOUNTER
Keppra level is outside order from Neurologist. This was forwarded to her neurology team.     Left message to call back for: patient  Information to relay to patient:  See below and help schedule appt today regarding her abnormal labs.

## 2021-06-09 NOTE — TELEPHONE ENCOUNTER
This is a situation that needs to be dealt with by the neurologist.  I am not a specialist on seizures and the neurologist is in another health system and needs to be able to see the results directly and ASAP.  She should have the studies done in the other health system so that this can be done.

## 2021-06-09 NOTE — TELEPHONE ENCOUNTER
----- Message from Esdras Cuellar MD sent at 7/2/2020  7:59 AM CDT -----  This patient of Dr. Bush's has some red flag test results.  Her Keppra blood level is markedly elevated and she has significantly abnormal liver function tests.  They should be addressed urgently and I would suggest that the patient be seen in the clinic today face-to-face.  Additional testing will be needed and her Keppra dose will need to be adjusted which is used for seizures.  Please help with this need for Pat.  Dr. Cuellar

## 2021-06-09 NOTE — TELEPHONE ENCOUNTER
INITIAL INR > 5.5 NOTIFICATION- Anticoagulation Management Program    Elvira Bush had an initial point of care INR of 5.7.     Venous confirmation of INR required per protocol. STAT venous sample drawn and being sent out for confirmation.    Anticoagulation template scanned into EHR. Patient phone call with [facility] Program ([facility]) being arranged for patient triage prior to discharge.     Darlene Salazar, RT (R)

## 2021-06-09 NOTE — TELEPHONE ENCOUNTER
Upcoming Appointment Question  When is the appointment: 6/30/2020  What is your appointment for?:   Labs  Who is your appointment scheduled with?: [facility]  What is your question/concern?:   Patient is questioning if Provider has put in lab orders for yearly labs to be done at above appointment.  Please reach out and advise patient.  Okay to leave a detailed message?: Yes

## 2021-06-09 NOTE — TELEPHONE ENCOUNTER
"ANTICOAGULATION  MANAGEMENT    Elvira Bush is on warfarin and had a point of care INR result > 5.5 or an \"error message\" on point of care meter today.    Afternoon INR; STAT venous INR processing and STAT  requested from lab    Spoke with Elvira via phone while at the lab and reviewed triage questions for potential signs and symptoms of bleeding.      Patient Response     Have you had any bleeding in the last week?   No   Have you passed any red, black, or tarry stools in last week?   No   Have you vomited/spit up any red or coffee ground material in last week?   No   Have you had any new, severe abdominal pain or bloating develop in last week?   No, but some constipation    Have you fallen or had any injuries in last week?   No   Do you currently have a severe, sudden onset headache?   No   Do you currently have any severe sudden changes in your vision?   No   Do you currently have any new onset numbness, weakness/paralysis?   No     Assessment/Plan:     Elvira's responses were negative for signs and symptoms of bleeding; may discharge from clinic    Elvira instructed to:       Hold warfarin today until venous INR result returns and receives follow up call from [facility]    Seek medical attention for new signs and symptoms of bleeding or a fall with injury.     Decrease green intake in the past week, denies any alcohol intake, had tylenol past couple of nights, she is on keppra long term,  Recent lack of appetite.       "

## 2021-06-09 NOTE — TELEPHONE ENCOUNTER
Left message to call back for: PT DAUGHTER JAGJIT   Information to relay to patient:  PLEASE RELAY DR ACOSTA MESSAGE BELOW

## 2021-06-09 NOTE — TELEPHONE ENCOUNTER
Who is calling:  Patient's daughter, Na  Reason for Call:  Caller stated she is concerned about the patient's seizures as she had another episode on Saturday. Caller stated her brother found the patient on the floor custodial into the house/patio. Caller stated she remembers Dr. Harvey, the patient's neurologist at [facility], had mentioned if the patient had more episodes then they would do a scan. Caller asked if Loli Alberts MD would call the patient neurologist's office to come up with a plan on the patient's seizures as they have not been good in the last 2 months?    Caller was informed to reach out to Dr. Harvey's office about the seizure episode that just occurred on Saturday and ask about the ordering a scan. Caller is questioning if Loli Alberts MD wants to order the scan to be done or should Dr. Harvey order it? Caller reported the patient has not been eating well/does not have much of an appetite. Caller stated she has noticed the patient has lost weight since the last time that she saw the patient. Caller stated she just wants a call back to discuss a plan for the patient in getting her to feel better and to have the seizures under control.  Date of last appointment with primary care: 7/2/20  Okay to leave a detailed message: No  [phone]

## 2021-06-09 NOTE — PROGRESS NOTES
"  SUBJECTIVE: Elvira Bush is a 80 y.o. White or  female who presents today to discuss the recent results of lab work done on 6/30.  She has a history of stroke causing focal changes and subsequent seizure.  She has chronic atrial fibrillation and is now currently anticoagulated on warfarin.  She is seen at [facility] neurology by Dr. Shamar Harvey.  Her labs showed that she was very supratherapeutic in her treatment with Keppra with a level of 96.4 with a normal level being between 6 and 46.  Prior to that she had levels at 65.5 and 50.4.  She had been taking 750 mg twice daily.  I review the visit with the neurologist on 6/9.  Her Keppra was increased at that time to 1000 mg twice daily because in early April she had episodes but slept words and \"not talking sense\" per her children.  It was thought she was having seizure issues.  The follow-up level of Keppra was ordered.  Dr. Bush ordered her other labs.  Her LFTs jumped quite a bit with an AST level of 180 and ALT level of 175.  Alk phos was 767.  Her BMP was completely normal.  Her LDL was 74.  Her total bili was 5.8 and the hemoglobin was 13.7.  I am unsure if the elevated liver enzymes are due to the Keppra dosing or by some other precipitating factor.  She does have a history of breast cancer 8 years ago.  She does tell me that since her Keppra dose was changed she has had poor appetite and is constipated.  She has had stomach issues with discomfort and so she took Tylenol to treat her discomfort.  This of course made things worse.  We discussed the necessity to have her labs faxed to her neurologist.  She does have a future appointment with them in October.  We discussed the fact that she needs to not take any Tylenol or drink alcohol and to back off on the Keppra as directed by her neurologist.  We discussed she will need to have these tests redone in a couple weeks.  She can come and see me at that time.  We need to monitor her " "condition.  We need to make sure it is the Keppra causing this and not some other reason.  Incidentally I looked back in her record and she had completely normal liver function tests in April 2019.    OBJECTIVE: /62   Pulse 80   Resp 16   Ht 5' 3\" (1.6 m)   Wt 110 lb 5 oz (50 kg)   SpO2 96%   Breastfeeding No   BMI 19.54 kg/m    General: Thinner appearing octogenarian in no acute physical distress  HEENT: Eyes without icterus  Heart: Irregularly irregular  Lungs: Clear bilaterally  Abdomen: Soft, nontender  Extremities: Warm, dry and without edema  Psych: Patient does not appear confused, but is not the best historian either    ASSESSMENT & PLAN:     1. Localization-related focal epilepsy with simple partial seizures (H)  Recent increase in Keppra dosing to 100 mg twice daily secondary to suspected seizure in April.    2. Elevated liver enzymes  I suspect this is due to overdosing with Keppra?  Labs faxed to her neurologist.    3. Chronic atrial fibrillation (H)  Anticoagulated on warfarin.    4.  History of CVA  Happened before she was anticoagulated and identified as having atrial fibrillation    5.  Hypertension  Well-controlled    6. Personal history of malignant neoplasm of breast  Hopefully this does not have to do with the increase in LFTs    We must follow her closely and so I want to see her back in 2 weeks time to see me and get labs rechecked as well.        Patient Active Problem List   Diagnosis     Osteopenia     Esophageal reflux     Breast cancer of lower-outer quadrant of left female breast (H)     Essential Hypercholesterolemia     Essential hypertension     Obesity     Headache     Meningioma (H)     Chronic atrial fibrillation (H)     Convulsions, unspecified convulsion type (H)     Encephalomalacia on imaging study     Localization-related focal epilepsy with simple partial seizures (H)     History of left breast cancer       Current Outpatient Medications on File Prior to Visit "   Medication Sig Dispense Refill     aspirin 81 MG tablet Take 81 mg by mouth daily.       atenoloL (TENORMIN) 100 MG tablet TAKE 1 TABLET BY MOUTH EVERY DAY 90 tablet 1     atorvastatin (LIPITOR) 20 MG tablet TAKE 1 TABLET BY MOUTH EVERYDAY AT BEDTIME 90 tablet 3     calcium-vitamin D (CALCIUM-VITAMIN D) 500 mg(1,250mg) -200 unit per tablet Take 2 tablets by mouth in the morning and 1 tablet by mouth at night.             hydroCHLOROthiazide (HYDRODIURIL) 25 MG tablet TAKE 1 TABLET BY MOUTH EVERY DAY 90 tablet 3     levETIRAcetam (KEPPRA) 500 MG tablet Take 1,000 mg by mouth.       magnesium oxide (MAG-OX) 400 mg tablet Take 1 tablet (400 mg total) by mouth daily.  0     multivitamin (MULTIVITAMIN) per tablet Take 1 tablet by mouth daily.       omeprazole (PRILOSEC) 20 MG capsule TAKE 1 CAPSULE BY MOUTH EVERY DAY 90 capsule 3     warfarin ANTICOAGULANT (COUMADIN/JANTOVEN) 5 MG tablet Take 1/2-1 tablet (2.5-5 mg) daily as directed. Adjust dose per INR results. 90 tablet 1     levETIRAcetam (KEPPRA) 750 MG tablet Take 750 mg by mouth 2 (two) times a day.       No current facility-administered medications on file prior to visit.

## 2021-06-09 NOTE — TELEPHONE ENCOUNTER
ANTICOAGULATION  MANAGEMENT    Assessment     Today's INR result of 4.3 is Supratherapeutic (goal INR of 2.0-3.0)        Warfarin taken as previously instructed    Poor appetite  may be affecting diet and INR  - appears to be ongoing, having issues with keppra     Decrease in greens reported related to poor appetite     No new medication/supplements affecting INR    Continues to tolerate warfarin with no reported s/s of bleeding or thromboembolism     Previous INR was Supratherapeutic    Plan:     Spoke with Elvira regarding INR result and instructed:     Warfarin Dosing Instructions:  Hold warfarin dosing today, then change warfarin dose to  2.5 mg daily on Mondays, Wednesdays and Fridays; and 5 mg daily rest of week  (15.4 % change)    Instructed patient to follow up no later than: 7-10 days     Education provided: importance of therapeutic range, importance of following up for INR monitoring at instructed interval, importance of taking warfarin as instructed, monitoring for bleeding signs and symptoms and when to seek medical attention/emergency care    Pat verbalizes understanding and agrees to warfarin dosing plan.    Instructed to call the [facility] for any changes, questions or concerns. (#[phone])   ?   Agueda Pettit RN    Subjective/Objective:      Elvira Bush, a 80 y.o. female is on warfarin.     Elvira reports:     Home warfarin dose: verbally confirmed home dose with Pat and updated on anticoagulation calendar     Missed doses: No     Medication changes:  No     S/S of bleeding or thromboembolism:  No     New Injury or illness:  No     Changes in diet or alcohol consumption:  Yes: Poor appetite      Upcoming surgery, procedure or cardioversion:  No    Anticoagulation Episode Summary     Current INR goal:   2.0-3.0   TTR:   61.7 % (1 y)   Next INR check:   7/21/2020   INR from last check:   4.30! (7/14/2020)   Weekly max warfarin dose:      Target end date:   Indefinite   INR check  location:      Preferred lab:      Send INR reminders to:   LAINE ENRIQUEZ       Comments:            Anticoagulation Care Providers     Provider Role Specialty Phone number    Loli Alberts MD Referring Family Medicine [phone]

## 2021-06-09 NOTE — TELEPHONE ENCOUNTER
Pt notified. She would also like her Keppra level checked - this is recommended by her neurologist office but no order has been sent by them after numerous requests to do so.     Orders for Keppra from neuro office received and given to lab.

## 2021-06-09 NOTE — PROGRESS NOTES
[facility] Hematology and Oncology Progress Note    Patient: Elvira Bush  MRN: [id number]  Date of Service: 7/16/2020       Reason for Visit:    Scheduled follow up    Assessment and Plan:    1 Breast cancer of LOQ (L) female breast     T1c, NX, cM0   Triple negative    80 y.o.     2014, March - abnormal mammogram    03/24/14 - lumpectomy:    Grade 3, 1.1 cm invasive ductal carcinoma,    ER negative, OK barely positive, HER-2/lolita negative    Negative margins.      No lymph node in the biopsy sample.       Lymphovascular invasion.      Patient opted against chemotherapy.      07/14/14 - completed 6040 cGy breast conservation EBRT.    07/26 - 08/02/17 hospitalized with symptoms concerning for a stroke with slurred speech, left hand and left foot weakness.  CTA showed encephalomalacia involving the right temporal lobe likely due to chronic infarct.  There was also likely a subacute infarct in the left basal ganglia. CTA did not show any high grade stenosis or aneurysm.  A partially calcified likely meningioma along right aspect of anterio rfalx was also noted.  MRI was also negative for any acute ischemia.  In the ED she became unresponsive and started seizing which lasted for about 1 minute.  She was loaded with keppra and then started on maintenance dose.  She became unresponsive following the seizure and was intubated and admitted to the ICU.  She was extubated on 7/29/2017.  The patient was found to have a UTI and her culture grew klebsiella, treated with IV rocephin, switched to oral cefdinir at discharge.   She was diagnosed with atrial fibrillation on 6/20/17 during a pre op physical.  She was asymptomatic.  ECHO showed her LVEF @ 55%.  Once she was stabilized she was started on coumadin with an INR goal of 2-2.5.  CHADS2 vasc score is at least 2 given age and HTN and with the old stroke seen on MRI is likely higher.     09/09 - 09/12/17 hospitalized with a left sacral and pubic rami fracture, displaced L5  "transverse process fracture and mechanical fall after tripping off a curb.  Orthopedics declared that she was not a surgical candidate.  She was discharged to [facility] for physical therapy and subsequently discharged from there on October 4, 2017.  During her TCU stay her strength and stability improved to the point that she was able to ambulate with a walker and after PT transitioned to a cane.  She was taking Tylenol, 2-500 mg tabs twice daily during that time.    11/02/17 (B) screening mammogram - benign findings.    11/02/17 DXA scan - The spine bone density L1-L3 with T-score -1.3, stable compared to 2013.  Femoral bone densities show left total hip T- score -1.1 and right femoral neck T-score -1.3 and significant decline of 4.7% on the left hip and 5.6% on the right hip compared to 2013.  Trabecular bone score indicates good trabecular bone architecture.Appropriate evaluation and treatment recommended with follow up bone density scan in 1 year.    11/05/18 (B) screening mammogram - benign findings.    07/16/20:    Kym presents alone, looking good but feeling a bit down.  She has a history of seizures on Keppra and is [facility]  Recently her Keppra dose was increased due to another \"seizure\".  She became supratherapeutic with Keppra and her LFTs and bili increased significantly as well.  She was also taking more Tylenol at the time.   She has since DC'd Tylenol and alcohol and decreased the Keppra dose and her LFTs and bili have dropped a lot, but remain a bit elevated.  She and her labs are being monitored closely by her PCP.  It certainly appears her elevated LFTs and bili are medication related as these lab values were WNL in April 2019.  However, with her history of triple negative breast cancer and opting against chemotherapy, frank is also concern for metastatic disease.  Also, since her Keppra dose was increased she's had a poor appetite and " "\"stomach discomfort\" which is why she was taking the Tylenol.  She's lost ~ 10 pounds weight this past year.  With the covid issues she has been forced away from her prior very active life, attending an exercise activity program generally 6 days/week and every other day swimming.     Elevated LFTs and bili significantly improved with medication changes.    If chemistries don't continue to improve and return to normal value limits would consider CT CAP to thoroughly evaluate due to her history of triple negative breast cancer.    Negative clinical breast and physical exam.  Continue self-breast exams.  Yearly mammogram in November - ordered.    Osteopenia - continue daily calcium 6120-4494 mg and vitamin D 0853-6980 iu supplements and bone strengthening activities.    She has f/u with her Neurologist scheduled to discuss Keppra dosing and other anti-seizure medication options considering her apparent intolerance of Keppra dosing.    07/16/21 - yearly follow up without labs, sooner if LFT and bili elevations don't return to normal with medication adjustments.    2) Atrial fib:    Coumadin dosing and monitoring through PCP.    ECOG Performance:     ECOG Performance Status: 0    Distress Assessment:    Distress Assessment Score: 3        TT > 25 minutes face to face with > 50% counseling and care coordination.    Nabor Masters, CNP     CC: Loli Alberts MD  _____________________________________________    Interim History:    Pat presents alone, looking good but feeling a bit down.  She has a history of seizures on Keppra and is followed by Dr. Shamar Harvey, Neurologist at [facility].  Recently her Keppra dose was increased due to another \"seizure\".  She became supratherapeutic with Keppra and her LFTs and bili increased significantly as well.  She was also taking more Tylenol at the time.   She has since DC'd Tylenol and alcohol and decreased the Keppra dose and her LFTs and bili have dropped a lot, " "but remain a bit elevated.  She and her labs are being monitored closely by her PCP.  It certainly appears her elevated LFTs and bili are medication related as these lab values were WNL in April 2019.  However, with her history of triple negative breast cancer and opting against chemotherapy, frank is also concern for metastatic disease.  Also, since her Keppra dose was increased she's had a poor appetite and \"stomach discomfort\" which is why she was taking the Tylenol.  She's lost another ~ 10 pounds weight this past year after intentionally losing ~40 pounds over the prior couple years through diet and exercise.  With the covid issues she has been forced away from her prior very active life, attending an exercise activity program generally 6 days/week and every other day swimming.  She denies pain, cough, fever, chills, shortness of breath, unusual headaches, visual or mentation disturbance, bowel or bladder issues, rash.  She performs self breast exams in the shower and has not noted any concerning changes or findings.       Pain Status:    Currently in Pain: No/denies      Review of Systems:    Constitutional  Constitutional (WDL): All constitutional elements are within defined limits  Neurosensory  Neurosensory (WDL): All neurosensory elements are within defined limits  Cardiovascular  Cardiovascular (WDL): All cardiovascular elements are within defined limits  Pulmonary  Respiratory (WDL): Within Defined Limits  Gastrointestinal  Gastrointestinal (WDL): Exceptions to WDL  Anorexia: Oral intake altered without significant weight loss or malnutrition, oral nutritional supplements indicated  Genitourinary  Genitourinary (WDL): All genitourinary elements are within defined limits  Integumentary  Integumentary (WDL): All integumentary elements are within defined limits  Patient Coping  Patient Coping: Accepting  Distress Assessment  Distress Assessment Score: 3  Accompanied by  Accompanied by: Alone    Past " Histories:    Past Medical History:   Diagnosis Date     Acute respiratory failure, unspecified whether with hypoxia or hypercapnia (H)      Atrial fibrillation (H)      Breast cancer (H) 3-2014     Closed displaced fracture of left pubis, initial encounter (H)      Closed fracture of spinous process of lumbar vertebra (H) 9/9/2017     Encephalopathy      GERD (gastroesophageal reflux disease)      HTN (hypertension)      Hx of radiation therapy 5-2014    Left Breast     Hypercholesterolemia      Intraabdominal hemorrhage 9/9/2017     Osteopenia      Pelvic fracture (H) 9/9/2017     Skin cancer      Transient alteration of awareness          Past Surgical History:   Procedure Laterality Date     BREAST LUMPECTOMY Left 3-2014     KNEE ARTHROSCOPY Right 2009     PICC  7/27/2017          SKIN CANCER EXCISION       TENDON RELEASE         Physical Exam:    Recent Vitals 7/16/2020   Height -   Weight 111 lbs 6 oz   BSA (m2) 1.5 m2   /83   Pulse 83   Temp 97.7   Temp src 1   SpO2 95   Some recent data might be hidden     General: Alert and oriented.  No distress.  Very pleasant.  HEENT: Anicteric sclera.  EOMI.  JIA.  No mucosal lesions.     Chest:  Lungs clear.    Cardiac: S1, S2 heard.  Regular rate and rhythm.  Soft systolic murmur.    Abdomen: Soft.  Not tender.  Not distended.  No palpable hepatosplenomegaly, masses or ascites.    Extremities:   No edema, cyanosis or clubbing.    Lymphatics:  No palpable lymphadenopathy in the neck, supraclavicular fossa or the armpit.        Breasts:  (R) no palpable lumps or bumps.  Nipple appears normal.  No skin changes.  Unremarkable axilla.    (L) no palpable lumps or bumps.  Well healed scar in the UOQ.  Nipple appears normal.  No skin changes.  Unremarkable axilla.    Patient declined offer of female  during exam.    Lab Results:    I reviewed OS 06/30/20 and 07/14/20 laboratories with patient.    Imaging:    No results found.

## 2021-06-09 NOTE — TELEPHONE ENCOUNTER
Spoke with daughter this morning. They have spoken with neurologist and have a scan set up for later this month. When asked if they knew about the Keppra lab level abnormality, the daughter didn't that was discussed or not. She will call neuro back and confirm this was acknowledged.     H.DeGree, CMA

## 2021-06-10 NOTE — PROGRESS NOTES
[facility] Hematology and Oncology Progress Note    Patient: Elvira Bush  MRN: [id number]  Date of Service: 04/19/2017        Reason for Visit    Follow up breast cancer    Assessment and Plan  Breast cancer of lower-outer quadrant of left female breast    Staging form: Breast, AJCC 7th Edition      Clinical: No stage assigned - Unsigned        Prognostic indicators: Triple negative        Pathologic: Stage Unknown (T1c, NX, cM0) - Signed by Loli Escalona MD on 6/10/2014        Prognostic indicators: Triple negative      ECOG Performance   ECOG Performance Status: 0     Distress Assessment  Distress Assessment Score: 4    Pain  Currently in Pain: No/denies      A 77-year-old female with pT1c left breast invasive ductal carcinoma, triple negative (low-level WV positive), status post left lumpectomy followed by adjuvant radiation therapy.  She declined adjuvant chemotherapy and endocrine therapy.      -No clinical evidence of breast cancer recurrence.   -Discussed about ongoing surveillance by clinical exam and mammogram and she agreed to continue that.  Continue with self breast exam.   -Continue surveillance with every 6 month clinical exams and lab, yearly bilateral mammogram in May.   -Advised on weight management and daily exercises.      #.  Right lateral breast nodule.  Stable tiny benign-appearing breast nodule.   -Stable appearing by mammograms since 2009.      Problem List    1. Breast cancer of lower-outer quadrant of left female breast  Comprehensive Metabolic Panel    HM1(CBC and Differential)    Mammo Screening Bilateral   2. Encounter for screening mammogram for malignant neoplasm of breast   Mammo Screening Bilateral      ______________________________________________________________________________    Diagnosis  Stage unknown(pT1c, pNx, cM0) invasive ductal carcinoma with micropapillary pattern of the left breast  - ER (negative, 0%), WV (low positive, 3%) HER2 (negative, score of 1+  by IHC)  - Kenneth grade 3  - Tumor size: 1.1 cm  - Margins negative  - Angiolymphatic invasion present - multifocal.  - No sentinel lymph node examined  - associated DCIS -high-grade (+)      Therapy to date:  3/25/14- left partial mastectomy with sentinel lymph node biopsy-however no lymph node tissue was identified on pathology.  7/14/14- adjuvant radiation therapy 6040 cGy to left breast    History of Present Illness    Mr. Bush is previously followed by Dr. Dominguez.  She is feeling great without any concern.  She denies any headache, or bone pain.  Her appetite is good.  She is fairly active with daily activities.  She denies any breast concern.  She reported she had a follow-up with Dr. Sevilla a couple weeks ago and she was told everything is well.  Apart from describing in HPI and ROS, the remainder of comprehensive ROS was negative.      Pain Status  Currently in Pain: No/denies    Review of Systems    Constitutional  Constitutional (WDL): All constitutional elements are within defined limits  Neurosensory  Neurosensory (WDL): All neurosensory elements are within defined limits  Eye   Eye Disorder (WDL): All eye disorder elements are within defined limits  Ear  Ear Disorder (WDL): All ear disorder elements are within defined limits  Cardiovascular  Cardiovascular (WDL): All cardiovascular elements are within defined limits  Pulmonary  Respiratory (WDL): Within Defined Limits  Gastrointestinal  Gastrointestinal (WDL): All gastrointestinal elements are within defined limits  Genitourinary  Genitourinary (WDL): All genitourinary elements are within defined limits  Lymphatic  Lymph (WDL): All lymph disorder elements are within defined limits  Musculoskeletal and Connective Tissue  Musculoskeletal and Connetive Tissue Disorders (WDL): All Musculoskeletal and Connetive Tissue Disorder elements are within defined limits  Integumentary  Integumentary (WDL): All integumentary elements are within defined  "limits  Patient Coping  Patient Coping: Accepting  Distress Assessment  Distress Assessment Score: 4  Accompanied by  Accompanied by: Alone  Oral Chemo Adherence       Past History  Past Medical History:   Diagnosis Date     Breast cancer 3-2014     Hx of radiation therapy 5-2014    Left Breast     Skin cancer        Physical Exam    Recent Vitals 4/19/2017   Height 5' 4.75\"   Weight 165 lbs 3 oz   BSA (m2) 1.85 m2   /86   Pulse 78   SpO2 99     General: alert, awake, not in acute distress  HEENT: Head: Normal, normocephalic, atraumatic.  Eye: Normal external eye, conjunctiva, lids cornea, JIA.  Ears: Normal TM's bilaterally. Normal auditory canals and external ears. Non-tender.  Nose: Normal external nose, mucus membranes and septum.  Pharynx: Dental Hygiene adequate. Normal buccal mucosa. Normal pharynx.  Neck / Thyroid: Supple, no masses, nodes, nodules or enlargement.  Lymphatics: No abnormally enlarged lymph nodes.  Chest: Normal chest wall and respirations. Clear to auscultation.  Breasts: Pendulous breasts.  Nipples are flat.  Fibro glandular breasts but no discrete nodularity noted.  Heart: S1 S2 RRR, no murmur.   Abdomen: abdomen is soft without significant tenderness, masses, organomegaly or guarding  Extremities: normal strength, tone, and muscle mass.  No tenderness on percussion of the spine.  Skin: normal. no rash or abnormalities  CNS: non focal.      Lab Results    Recent Results (from the past 168 hour(s))   Hepatic Profile   Result Value Ref Range    Bilirubin, Total 0.6 0.0 - 1.0 mg/dL    Bilirubin, Direct 0.2 <=0.5 mg/dL    Protein, Total 7.4 6.0 - 8.0 g/dL    Albumin 4.1 3.5 - 5.0 g/dL    Alkaline Phosphatase 71 45 - 120 U/L    AST 20 0 - 40 U/L    ALT 18 0 - 45 U/L       Imaging    No results found.      Signed by: Vangie Montoya MD  "

## 2021-06-10 NOTE — TELEPHONE ENCOUNTER
"Called and spoke with pt. She states that whatever she needed has been taken care of. She said she did speak with neurology and they \"fixed\" it for her. Nothing needed at this time.    Marge Jaimes CMA  "

## 2021-06-10 NOTE — TELEPHONE ENCOUNTER
ANTICOAGULATION  MANAGEMENT    Assessment     Today's INR result of 3.8 is Supratherapeutic (goal INR of 2.0-3.0)        Warfarin taken as previously instructed    Decreased greens/vitamin K intake may be affecting INR    No new medication/supplements affecting INR    Continues to tolerate warfarin with no reported s/s of bleeding or thromboembolism     Previous INR was Supratherapeutic    Plan:     Spoke with Elvira regarding INR result and instructed:     Warfarin Dosing Instructions:  Hold warfarin tonight,  then change warfarin dose to 5 mg daily on Sundays and Wednesdays; and 2.5 mg daily rest of week  (10 % change)    Instructed patient to follow up no later than: 2 weeks     Education provided: importance of therapeutic range, importance of following up for INR monitoring at instructed interval, importance of taking warfarin as instructed, monitoring for bleeding signs and symptoms and when to seek medical attention/emergency care    Pat verbalizes understanding and agrees to warfarin dosing plan.    Went over dosing instructions, phone hung up, unable to schedule follow up. Called patient back, left a detailed message recapping conversation, offered call back for follow up appointment.     Instructed to call the [facility] for any changes, questions or concerns. (#[phone])   ?   Agueda Pettit RN    Subjective/Objective:      Elvira Bush, a 80 y.o. female is on warfarin.     Elvira reports:     Home warfarin dose: template incorrect; verbally confirmed home dose with Pat and updated on anticoagulation calendar     Missed doses: No     Medication changes:  No     S/S of bleeding or thromboembolism:  No     New Injury or illness:  No, ongoing decrease in greens, upset GI still having elevation in INR r/t keppra intolerance      Changes in diet or alcohol consumption:  No, decrease greens     Upcoming surgery, procedure or cardioversion:  No    Anticoagulation Episode Summary     Current INR  goal:   2.0-3.0   TTR:   57.8 % (1 y)   Next INR check:   8/11/2020   INR from last check:   3.80! (7/28/2020)   Weekly max warfarin dose:      Target end date:   Indefinite   INR check location:      Preferred lab:      Send INR reminders to:   LAINE ENRIQUEZ       Comments:            Anticoagulation Care Providers     Provider Role Specialty Phone number    Loli Alberts MD Referring Family Medicine [phone]

## 2021-06-10 NOTE — TELEPHONE ENCOUNTER
I called daughter/Na - I faxed to the number provided yesterday. Na stating they have not received. I asked to give them a call and see if the number was correct and if not I can re fax her lab results. Gave Na my direct number

## 2021-06-10 NOTE — TELEPHONE ENCOUNTER
ANTICOAGULATION  MANAGEMENT    Assessment     Today's INR result of 1.2 is Subtherapeutic (goal INR of 2.0-3.0)        Warfarin taken as previously instructed    Eating well  may be affecting diet and INR - reports now having 3 meals a day, previously had a drastic change in appetite     Keppra dose adjustments are going well, patient feels much better     No new medication/supplements affecting INR    Continues to tolerate warfarin with no reported s/s of bleeding or thromboembolism     Previous INR was Supratherapeutic    Plan:     Spoke on phone with Elvira regarding INR result and instructed:     Warfarin Dosing Instructions:  Take one time booster dose warfarin 5 mg  then change warfarin dose to 5 mg daily on Sundays, Wednesdays and Fridays; and 2.5 mg daily rest of week  (0 % change)    Instructed patient to follow up no later than: 8/31/2020    Education provided: importance of consistent vitamin K intake, vitamin K content of foods, importance of therapeutic range, importance of following up for INR monitoring at instructed interval, importance of taking warfarin as instructed, monitoring for clotting signs and symptoms and when to seek medical attention/emergency care    Pat verbalizes understanding and agrees to warfarin dosing plan.    Instructed to call the [facility] for any changes, questions or concerns. (#[phone])   ?   Agueda Pettit RN    Subjective/Objective:      Elvira Bush, a 80 y.o. female is on warfarin.     Elvira reports:     Home warfarin dose: verbally confirmed home dose with Pat and updated on anticoagulation calendar     Missed doses: No     Medication changes:  No     S/S of bleeding or thromboembolism:  No     New Injury or illness:  No     Changes in diet or alcohol consumption:  Yes: back to eating 3 meals a day      Upcoming surgery, procedure or cardioversion:  No    Anticoagulation Episode Summary     Current INR goal:   2.0-3.0   TTR:   51.8 % (1 y)   Next INR  check:   8/31/2020   INR from last check:   1.20! (8/25/2020)   Weekly max warfarin dose:      Target end date:   Indefinite   INR check location:      Preferred lab:      Send INR reminders to:   LAINE ENRIQUEZ       Comments:            Anticoagulation Care Providers     Provider Role Specialty Phone number    Loli Alberts MD Referring Family Medicine [phone]

## 2021-06-10 NOTE — TELEPHONE ENCOUNTER
ANTICOAGULATION  MANAGEMENT    Assessment     Today's INR result of 3.5 is Supratherapeutic (goal INR of 2.0-3.0)        More warfarin taken than instructed which may be affecting INR    adjustments of her Keppra and side effects may be affecting diet and INR    Keppra dose adjustments    Continues to tolerate warfarin with no reported s/s of bleeding or thromboembolism     Previous INR was Supratherapeutic    Plan:     Spoke on phone with Elvira regarding INR result and instructed:     Warfarin Dosing Instructions:  Change warfarin dose to 5 mg daily on Sundays and Fridays; and 2.5 mg daily rest of week  (11.1 % change)    Instructed patient to follow up no later than: two weeks.    Education provided: importance of consistent vitamin K intake, impact of vitamin K foods on INR, importance of therapeutic range, importance of following up for INR monitoring at instructed interval and importance of taking warfarin as instructed    Pat verbalizes understanding and agrees to warfarin dosing plan.    Instructed to call the [facility] for any changes, questions or concerns. (#[phone])   ?   Fatemeh Shetty RN    Subjective/Objective:      Elvira BRAD Bush, a 80 y.o. female is on warfarin.     Elvira reports:     Home warfarin dose: template incorrect; verbally confirmed home dose with Pat and updated on anticoagulation calendar     Missed doses: No     Medication changes:  Yes: her Keppra has been being adjust past few weeks. Causing side effects.      S/S of bleeding or thromboembolism:  No     New Injury or illness:  Yes: side effects of dose adjustment of her Keppra. Could not eat due to nausea.     Changes in diet or alcohol consumption:  Yes: she is finally starting to eat again.     Upcoming surgery, procedure or cardioversion:  No    Anticoagulation Episode Summary     Current INR goal:   2.0-3.0   TTR:   54.0 % (1 y)   Next INR check:   8/11/2020   INR from last check:   3.50! (8/11/2020)   Weekly  max warfarin dose:      Target end date:   Indefinite   INR check location:      Preferred lab:      Send INR reminders to:   LAINE ENRIQUEZ       Comments:            Anticoagulation Care Providers     Provider Role Specialty Phone number    Loli Alberts MD Referring [facility] [phone]

## 2021-06-10 NOTE — TELEPHONE ENCOUNTER
Spoke with Dr. Alberts, her adv would be to contact pt neurologist. Dr. Calderón's thinks her sx are after affects of a seizure she may be having w/o realizing it. Especially if pt is missing medications. Pt should keep her f/u appt with Dr Alberts on Monday 8/3/2020

## 2021-06-10 NOTE — TELEPHONE ENCOUNTER
"Who is calling:  Na, the patient's daughter  Reason for Call:  The caller states the patient \" had an episode\" yesterday. The patient spent the day at the caller's house yesterday and the patient was very fatigued, weak, very minimal strength to walk, no appetite and slightly confused over the duration of the day. On the ride back to the patient's home the patient states she was feeling \" hot\" while in the air conditioned car. The caller states the patient did not have a fever, denied any urinary symptoms. The caller states she spoke to the patient this morning and the patient had an appetite, not as tired today as much as yesterday.The caller states she is somewhat concerned if the patient is taking her medications correctly.  Date of last appointment with primary care:   Okay to leave a detailed message: Yes  The caller would like a call.      "

## 2021-06-10 NOTE — TELEPHONE ENCOUNTER
Patient Returning Call  Reason for call:  Return call  Information relayed to patient:  n/a  Patient has additional questions:  Yes  If YES, what are your questions/concerns:  Patient is returning call. Please call patient back.  Okay to leave a detailed message?: No  [phone]

## 2021-06-10 NOTE — TELEPHONE ENCOUNTER
Who is calling:  Na   Reason for Call:  Caller stated that she is needing to know if the results were faxed over or not. Caller would like a call back to be informed.   Date of last appointment with primary care:   Okay to leave a detailed message: Yes

## 2021-06-11 ENCOUNTER — COMMUNICATION - HEALTHEAST (OUTPATIENT)
Dept: ADMINISTRATIVE | Facility: HOSPITAL | Age: 81
End: 2021-06-11

## 2021-06-11 NOTE — TELEPHONE ENCOUNTER
Routed to the anticoagulation pool    Brenna Green, RN     [facility] Medication Refill and Triage Nurse  9:16 AM  9/25/2020

## 2021-06-11 NOTE — TELEPHONE ENCOUNTER
"ANTICOAGULATION  MANAGEMENT    Assessment     Today's INR result of 1.8 is Subtherapeutic (goal INR of 2.0-3.0)        Warfarin taken as previously instructed    No new diet changes affecting INR     Patient was previously having issues with eating patterns, she is now eating well having at least 2 big meals during the day, she denies eating much greens but still has some in her diet. States she \"feels great\"     No new medication/supplements affecting INR    Continues to tolerate warfarin with no reported s/s of bleeding or thromboembolism     Previous INR was Subtherapeutic X3     Plan:     Spoke on phone with Elvira regarding INR result and instructed:     Warfarin Dosing Instructions:  Change warfarin dose to 5 mg daily  (7.7 % change)    Instructed patient to follow up no later than: 2 weeks     Education provided: importance of consistent vitamin K intake, importance of therapeutic range, importance of following up for INR monitoring at instructed interval, importance of taking warfarin as instructed, importance of notifying clinic for changes in medications, when to seek medical attention/emergency care and importance of notifying clinic for diarrhea, nausea/vomiting, reduced intake and/or illness    Kym verbalizes understanding and agrees to warfarin dosing plan.    Instructed to call the [facility] for any changes, questions or concerns. (#[phone])   ?   Agueda Pettit RN    Subjective/Objective:      Elvira BRAD Bush, a 80 y.o. female is on warfarin.     Elvira reports:     Home warfarin dose: verbally confirmed home dose with Kym and updated on anticoagulation calendar     Missed doses: No     Medication changes:  No     S/S of bleeding or thromboembolism:  No     New Injury or illness:  No     Changes in diet or alcohol consumption:  No     Upcoming surgery, procedure or cardioversion:  No    Anticoagulation Episode Summary     Current INR goal:   2.0-3.0   TTR:   46.0 % (1 y)   Next INR check: "   9/29/2020   INR from last check:   1.80! (9/15/2020)   Weekly max warfarin dose:      Target end date:   Indefinite   INR check location:      Preferred lab:      Send INR reminders to:   LAINE ENRIQUEZ       Comments:            Anticoagulation Care Providers     Provider Role Specialty Phone number    Loli Alberts MD Referring Family Medicine [phone]

## 2021-06-11 NOTE — TELEPHONE ENCOUNTER
ANTICOAGULATION  MANAGEMENT    Assessment     Today's INR result of 1.4 is Subtherapeutic (goal INR of 2.0-3.0)        Warfarin taken as previously instructed    No new diet changes affecting INR    No new medication/supplements affecting INR    Continues to tolerate warfarin with no reported s/s of bleeding or thromboembolism     Previous INR was Subtherapeutic    Plan:     Spoke on phone with Elvira regarding INR result and instructed:     Warfarin Dosing Instructions:  Change warfarin dose to 2.5 mg daily on Mondays and Fridays; and 5 mg daily rest of week  (20 % change)    Instructed patient to follow up no later than: 9/4    Education provided: importance of therapeutic range, importance of following up for INR monitoring at instructed interval, importance of taking warfarin as instructed, monitoring for clotting signs and symptoms and when to seek medical attention/emergency care     Patient back to normal eating patterns, may need to consider old dose, warfarin 2.5 once a week and 5 all other days. Would like to recheck prior to long weekend.     Pat verbalizes understanding and agrees to warfarin dosing plan.    Instructed to call the [facility] for any changes, questions or concerns. (#[phone])   ?   Agueda Pettit RN    Subjective/Objective:      Elvira Bush, a 80 y.o. female is on warfarin.     Elvira reports:     Home warfarin dose: verbally confirmed home dose with Pat  and updated on anticoagulation calendar     Missed doses: No     Medication changes:  No     S/S of bleeding or thromboembolism:  No     New Injury or illness:  No     Changes in diet or alcohol consumption:  No     Upcoming surgery, procedure or cardioversion:  No    Anticoagulation Episode Summary     Current INR goal:   2.0-3.0   TTR:   50.2 % (1 y)   Next INR check:   9/4/2020   INR from last check:   1.40! (8/31/2020)   Weekly max warfarin dose:      Target end date:   Indefinite   INR check location:      Preferred  lab:      Send INR reminders to:   LAINE ENRIQUEZ       Comments:            Anticoagulation Care Providers     Provider Role Specialty Phone number    Loli Alberts MD Referring Family Medicine [phone]

## 2021-06-11 NOTE — TELEPHONE ENCOUNTER
ANTICOAGULATION  MANAGEMENT    Assessment     Today's INR result of 2.4 is Therapeutic (goal INR of 2.0-3.0)        Warfarin taken as previously instructed    No new diet changes affecting INR    No new medication/supplements affecting INR    Continues to tolerate warfarin with no reported s/s of bleeding or thromboembolism     Previous INR was Subtherapeutic    Plan:     Spoke on phone with Elvira regarding INR result and instructed:     Warfarin Dosing Instructions:  Continue current warfarin dose 5 mg daily (0 % change)    Instructed patient to follow up no later than: 2 weeks     Education provided: importance of therapeutic range, target INR goal and significance of current INR result, importance of following up for INR monitoring at instructed interval, importance of taking warfarin as instructed, importance of notifying clinic for changes in medications, when to seek medical attention/emergency care and importance of notifying clinic for diarrhea, nausea/vomiting, reduced intake and/or illness    Pat verbalizes understanding and agrees to warfarin dosing plan.    Instructed to call the [facility] for any changes, questions or concerns. (#[phone])   ?   Elma Pettit RN    Subjective/Objective:      Elvira Bush, a 80 y.o. female is on warfarin.     Elvira reports:     Home warfarin dose: verbally confirmed home dose with Pat and updated on anticoagulation calendar     Missed doses: No     Medication changes:  No     S/S of bleeding or thromboembolism:  No     New Injury or illness:  No     Changes in diet or alcohol consumption:  No     Upcoming surgery, procedure or cardioversion:  No    Anticoagulation Episode Summary     Current INR goal:   2.0-3.0   TTR:   44.8 % (1 y)   Next INR check:   10/15/2020   INR from last check:   2.40 (9/29/2020)   Weekly max warfarin dose:      Target end date:   Indefinite   INR check location:      Preferred lab:      Send INR reminders to:   LAINE  ZBIGNIEW ENRIQUEZ       Comments:            Anticoagulation Care Providers     Provider Role Specialty Phone number    Loli Alberts MD Referring Family Medicine [phone]

## 2021-06-11 NOTE — TELEPHONE ENCOUNTER
ANTICOAGULATION  MANAGEMENT    Assessment     Today's INR result of 1.8 is Subtherapeutic (goal INR of 2.0-3.0)        Warfarin taken as previously instructed    No new diet changes affecting INR    No new medication/supplements affecting INR    Continues to tolerate warfarin with no reported s/s of bleeding or thromboembolism     Previous INR was Subtherapeutic    Plan:     Spoke on phone with Elvira regarding INR result and instructed:     Warfarin Dosing Instructions:  Change warfarin dose to 2.5 mg daily on Mondays; and 5 mg daily rest of week  (8.3 % change)    Instructed patient to follow up no later than: 2 weeks     Education provided: importance of therapeutic range, importance of following up for INR monitoring at instructed interval, importance of taking warfarin as instructed and when to seek medical attention/emergency care    Pat verbalizes understanding and agrees to warfarin dosing plan. Was able to repeat back plan.    Instructed to call the [facility] for any changes, questions or concerns. (#[phone])   ?   Agueda Pettit RN    Subjective/Objective:      Elvira Bush, a 80 y.o. female is on warfarin.     Elvira reports:     Home warfarin dose: verbally confirmed home dose with Pat and updated on anticoagulation calendar     Missed doses: No     Medication changes:  No     S/S of bleeding or thromboembolism:  No     New Injury or illness:  No     Changes in diet or alcohol consumption:  No     Upcoming surgery, procedure or cardioversion:  No    Anticoagulation Episode Summary     Current INR goal:   2.0-3.0   TTR:   49.0 % (1 y)   Next INR check:   9/18/2020   INR from last check:   1.80! (9/4/2020)   Weekly max warfarin dose:      Target end date:   Indefinite   INR check location:      Preferred lab:      Send INR reminders to:   LAINE ENRIQUEZ       Comments:            Anticoagulation Care Providers     Provider Role Specialty Phone number    Loli Alberts MD  [facility] [phone]

## 2021-06-11 NOTE — PROGRESS NOTES
" /72 (Patient Site: Left Arm, Patient Position: Sitting, Cuff Size: Adult Regular)  Pulse 70  Temp 98.1  F (36.7  C) (Oral)   Ht 5' 4.5\" (1.638 m)  Wt 164 lb 11.2 oz (74.7 kg)  SpO2 99%  Breastfeeding? No  BMI 27.83 kg/m2    Elvira Bush is a 77 y.o. female here for preop physical.  She is scheduled for right hammertoe correction with  the foot and ankle clinic in [facility] on the 23rd June.  She has had problems with her right foot hammertoe with increasing pain and difficulty with shoe wear.  This is begun to limit her ability to walk and exercise.  She has tried a variety of conservative therapies without benefit.  We reviewed the risks and benefits of the surgery the risks of anesthesia her questions are asked and answered.  She is not had any problems with anesthesia in the past nor she aware of any family history of problems with anesthesia.    She has had no chest pains or shortness of breath we reviewed her recent visit with oncology and her recent labs that were normal.  Discussed heart disease prevention and cancer detection and she is given stool guaiac cards for colon cancer screening which is her preference.  Active Ambulatory Problems     Diagnosis Date Noted     Osteopenia      Esophageal Reflux      Breast cancer of lower-outer quadrant of left female breast      Essential Hypercholesterolemia      Essential Hypertension      Obesity      Headache      Elevated LFTs 04/22/2016     Resolved Ambulatory Problems     Diagnosis Date Noted     Skin Cancer      Imaging Studies Nonspecific Abnormal Findings Breast      Skin cancer 06/03/2014     Past Medical History:   Diagnosis Date     Breast cancer 3-2014     GERD (gastroesophageal reflux disease)      HTN (hypertension)      Hx of radiation therapy 5-2014     Hypercholesterolemia      Osteopenia      Skin cancer      Past Surgical History:   Procedure Laterality Date     BREAST LUMPECTOMY Left 3-2014     KNEE ARTHROSCOPY " Right 2009     SKIN CANCER EXCISION       TENDON RELEASE       Family History   Problem Relation Age of Onset     Coronary artery disease Mother      No Medical Problems Daughter      Stroke Maternal Grandmother      Prostate cancer Maternal Grandfather      Cerebral aneurysm Maternal Aunt      No Medical Problems Paternal Aunt      Skin cancer Son      BRCA 1/2 Neg Hx      Breast cancer Neg Hx      Cancer Neg Hx      Colon cancer Neg Hx      Endometrial cancer Neg Hx      Ovarian cancer Neg Hx        Current Outpatient Prescriptions:      aspirin 81 MG tablet, Take 81 mg by mouth daily., Disp: , Rfl:      atenolol (TENORMIN) 100 MG tablet, TAKE 1 TABLET (100 MG TOTAL) BY MOUTH DAILY., Disp: 90 tablet, Rfl: 1     atorvastatin (LIPITOR) 20 MG tablet, Take 1 tablet (20 mg total) by mouth bedtime. Call 24/7 to set up your follow up visit with labs for January, Disp: 90 tablet, Rfl: 1     calcium-vitamin D (CALCIUM-VITAMIN D) 500 mg(1,250mg) -200 unit per tablet, Take 1 tablet by mouth daily., Disp: , Rfl:      hydroCHLOROthiazide (HYDRODIURIL) 25 MG tablet, TAKE 1 TABLET (25 MG TOTAL) BY MOUTH DAILY., Disp: 90 tablet, Rfl: 1     multivitamin (MULTIVITAMIN) per tablet, Take 1 tablet by mouth daily., Disp: , Rfl:      omeprazole (PRILOSEC) 20 MG capsule, Take 1 capsule (20 mg total) by mouth daily. Please call now for your January visit with lab work. Call 24/7, Disp: 90 capsule, Rfl: 1  No Known Allergies  Immunization History   Administered Date(s) Administered     DT (pediatric) 11/04/2003     Influenza high dose, seasonal 09/23/2014, 09/13/2016     Influenza, inj, historic 10/16/2007, 10/21/2008, 09/30/2014     Influenza, seasonal,quad inj 36+ mos 09/29/2015     Influenza, seasonal,quad inj 6-35 mos 10/01/2009, 09/14/2010, 10/02/2012, 09/27/2013     Pneumo Conj 13-V (2010&after) 01/08/2016     Pneumo Polysac 23-V 11/04/2003     Td, historic 11/04/2003     Tdap 01/08/2016     ZOSTER 01/18/2011     Social History      Social History     Marital status:      Spouse name: N/A     Number of children: N/A     Years of education: N/A     Occupational History     Not on file.     Social History Main Topics     Smoking status: Never Smoker     Smokeless tobacco: Never Used     Alcohol use 1.2 oz/week     2 Glasses of wine per week     Drug use: No     Sexual activity: Not on file     Other Topics Concern     Not on file     Social History Narrative     Habits: She is a non-smoker use alcohol once a week and caffeine once a day  Her review of systems all otherwise negative    General Appearance:    Alert, cooperative, no distress, appears stated age   Head:    Normocephalic, without obvious abnormality, atraumatic   Eyes:    PERRL, conjunctiva/corneas clear, EOM's intact, fundi     benign, both eyes     glasses are warm    Ears:    Normal TM's and external ear canals, both ears   Nose:   Nares normal, septum midline, mucosa normal, no drainage    or sinus tenderness   Throat:   Lips, mucosa, and tongue normal; teeth and gums normal   Neck:   Supple, symmetrical, trachea midline, no adenopathy;        thyroid:  No enlargement/tenderness/nodules; no carotid    bruit or JVD   Back:     Symmetric, no curvature, ROM normal, no CVA tenderness   Lungs:     Clear to auscultation bilaterally, respirations unlabored   Chest wall:    No tenderness or deformity   Heart:    Regular rate and rhythm, S1 and S2 normal, no murmur, rub   or gallop   Abdomen:     Soft, non-tender, bowel sounds active all four quadrants,     no masses, no organomegaly           Extremities:  on her right foot her second toe has a significant hammertoe that nearly fixed the other toes have an element of hammertoe but are not quite so fixed but firm and the remainer of her extremity exam is normal    Pulses:   2+ and symmetric all extremities   Skin:   Skin color, texture, turgor normal, no rashes or lesions   Lymph nodes:   Cervical, supraclavicular, and axillary  nodes normal   Neurologic:   CNII-XII intact. Normal strength, sensation and reflexes       throughout    Lab and EKG pending    Assessment: Operative history and physical for right foot hammertoes with underlying hypertension hypercholesterolemia GERD osteopenia and previous breast CA    Plan: Okay for surgery and anesthesia I have asked her to follow-up here in 6 months or as needed through with oncology as directed    Addendum: EKG shows rate controlled atrial fibrillation which she is new for her.  She is asymptomatic with this.  It is my medical opinion that she is still safe to go ahead with her foot surgery but I have set her up to see cardiology will check thyroid studies and an echocardiogram.  We discussed briefly chads scoring and I encouraged her to continue with an aspirin a day (ZAHRA=2=HTN and Age) until she sees Cardiology.

## 2021-06-12 NOTE — PROGRESS NOTES
[facility]      Code Status:  FULL CODE  Visit Type: Review Of Multiple Medical Conditions     Facility:  [facility] [[id number]]           History of Present Illness: Elvira Bush is a 77 y.o. female who is currently admitted to the TCU as a transfer from the hospital.  This is a 77-year-old previously healthy female with underlying history of A. fib as well as a history of breast cancer in 2014 along with hypertension who was brought into the emergency room when she was driving by a friend with acute onset of slurring of speech and left-sided weakness including left hand and left foot with confusion while in the emergency room.  Patient became unresponsive and started having seizures.   she underwent an emergent CTA to rule out CVA.  She was intubated and subsequently extubated on 7/29/17.  Neurology saw this patient for complex partial seizures with negative MRI for any acute ischemia.  EEG did not show any specific finding other than nonspecific slowing and based on the recommendation she is currently on Keppra with advice to do an outpatient follow-up in 6-8 weeks  Her confusion seems to be resolving and she is becoming alert every day as per her.  Her left-sided weakness of the hand and foot seems to be resolving and she is ambulating using a walker.  In addition postictal course complicated by acute hypoxic respiratory failure requiring intubation as well as stridor for which she was given steroids likely related to mild upper airway edema as per ENT.  Both these issues have completely resolved  Was also noted to have a UTI and she has finished her treatment.  In addition she has chronic A. fib and remains asymptomatic.  Based on her risk profile she has been started on anticoagulation and her INR are being monitored closely  She has been sleeping much better and denies any difficulty.  Prior to the event she had significant insomnia      Past Medical History:    Diagnosis Date     Atrial fibrillation      Breast cancer 3-2014     GERD (gastroesophageal reflux disease)      HTN (hypertension)      Hx of radiation therapy 5-2014    Left Breast     Hypercholesterolemia      Osteopenia      Skin cancer      Past Surgical History:   Procedure Laterality Date     BREAST LUMPECTOMY Left 3-2014     KNEE ARTHROSCOPY Right 2009     [facility]  7/27/2017          SKIN CANCER EXCISION       TENDON RELEASE       Family History   Problem Relation Age of Onset     Coronary artery disease Mother      No Medical Problems Daughter      Stroke Maternal Grandmother      Prostate cancer Maternal Grandfather      Cerebral aneurysm Maternal Aunt      No Medical Problems Paternal Aunt      Skin cancer Son      BRCA 1/2 Neg Hx      Breast cancer Neg Hx      Cancer Neg Hx      Colon cancer Neg Hx      Endometrial cancer Neg Hx      Ovarian cancer Neg Hx      Social History     Social History     Marital status:      Spouse name: N/A     Number of children: N/A     Years of education: N/A     Occupational History     Not on file.     Social History Main Topics     Smoking status: Never Smoker     Smokeless tobacco: Never Used     Alcohol use 1.2 oz/week     2 Glasses of wine per week     Drug use: No     Sexual activity: Not on file     Other Topics Concern     Not on file     Social History Narrative     Current Outpatient Prescriptions   Medication Sig Dispense Refill     aspirin 81 MG tablet Take 81 mg by mouth daily.       atenolol (TENORMIN) 100 MG tablet Take 100 mg by mouth daily.       atorvastatin (LIPITOR) 20 MG tablet Take 20 mg by mouth daily.       calcium-vitamin D (CALCIUM-VITAMIN D) 500 mg(1,250mg) -200 unit per tablet Take 1 tablet by mouth daily.       hydroCHLOROthiazide (HYDRODIURIL) 25 MG tablet Take 25 mg by mouth daily.       levETIRAcetam (KEPPRA) 750 MG tablet Take 1 tablet (750 mg total) by mouth 2 (two) times a day. 60 tablet 0     magnesium oxide (MAG-OX) 400 mg tablet  Take 1 tablet (400 mg total) by mouth daily.  0     multivitamin (MULTIVITAMIN) per tablet Take 1 tablet by mouth daily.       omeprazole (PRILOSEC) 20 MG capsule Take 20 mg by mouth daily before breakfast.       QUEtiapine (SEROQUEL) 25 MG tablet Take 0.5 tablets (12.5 mg total) by mouth at bedtime. 30 tablet 0     warfarin (COUMADIN) 5 MG tablet Take 5mg by mouth daily 8/2/17 and 8/3/17. Adjust dose based on INR results 8/4/17. Goal INR 2-2.5 FOR ATRIAL FIBRILLATION 15 tablet 0     No current facility-administered medications for this visit.      No Known Allergies      Review of Systems:    Constitutional: Negative.  Negative for fever, chills, has activity change, appetite change and fatigue.   HENT: Negative for congestion and facial swelling.    Eyes: Negative for photophobia, redness and visual disturbance.   Respiratory: Negative for cough and chest tightness.    Cardiovascular: Negative for chest pain, palpitations and leg swelling.   Gastrointestinal: Negative for nausea, diarrhea, constipation, blood in stool and abdominal distention.   Genitourinary: Negative.    Musculoskeletal: Negative.    Right foot pain due to recent surgery with some swelling associated  Skin: Negative.    Neurological: Negative for dizziness, tremors, syncope, weakness, light-headedness and headaches.   Hematological: Does not bruise/bleed easily.   Psychiatric/Behavioral: Negative.      Vitals:    08/08/17 1045   BP: 138/88   Pulse: 72   Temp: 98  F (36.7  C)       Physical Exam:    GENERAL: no acute distress. Cooperative in conversation.   HEENT: pupils are equal, round and reactive. Oral mucosa is moist and intact.  RESP:Chest symmetric. Regular respiratory rate. No stridor.  CVS: S1S2.  irRegular heart rate  ABD: Nondistended, soft.  EXTREMITIES: No lower extremity edema.   NEURO: non focal. Alert and oriented x3.   PSYCH: within normal limits. No depression or anxiety.  SKIN: warm dry intact       Labs:      Cta Head And  Neck  Result Date: 7/26/2017  CONCLUSION: HEAD CTA: 1. Noncontrast head CT demonstrates encephalomalacia involving the right temporal lobe likely relating to a chronic infarct. A 0.9 x 0.7 cm low-attenuation area in the left basal ganglia is nonspecific and may relate to age indeterminate ischemic change (possibly subacute). Alternatively, this may represent an enlarged perivascular space. A 0.4 cm partially calcified lesion along the right aspect of the anterior falx may represent a partially calcified meningioma. Irregularity of the nasal bones is most  consistent with age indeterminate, possibly chronic, nasal bone fractures. 2. Head CTA demonstrates no aneurysm or significant stenosis in the proximal intracranial vessels. NECK CTA: 1. No significant stenosis in the neck vessels based on NASCET criteria. 2. No evidence for dissection. Head CT results called to Dr. Covarrubias at 329pm on 7/26/17. CTA results called to Dr. Covarrubias at 352pm.      Mr Brain With Without Contrast  Result Date: 7/27/2017      CONCLUSION: 1.  No acute intracranial finding. No evidence for recent ischemia, intracranial hemorrhage, or hydrocephalus. 2.  7 mm presumed meningioma projecting rightward from the anterior falx. 3.  Chronic infarct right temporoparietal junction. 4.  Age-related changes.      Assessment/Plan:    New onset complex partial seizures.  Patient was see by neurology and underwent an EEGwhich showed nonspecific slowing.  MRI was negative for any ischemic changes or acute ischemia currently on Keppra 750 twice daily with advised to follow-up closely with neurology in 6 weeks  Klebsiella UTI currently on antibiotics denies any dysuria .RESOLVED.  chronic A. fib continue with her beta-blocker  Based On her risk profile and chads 2 vascular score she is currently on Coumadin she will have an INR check done.  Echocardiogram done showed a EF of 55% with moderate MR  Acute hypoxic respiratory failure requiring intubation currently  resolved.  Stridor felt to be due to mild upper airway edema.  ENT saw her and prescribed steroids currently resolved   Basilar pneumonia-resolved with Rx.  NO COUGH  Hypertension-Continue with her home dosage of atenolol and hydrochlorothiazide  Hyperlipidemia-on lipitor  History of breast cancer status post radiation treatment currently in remission  Electrolyte imbalance-currently plan will be to check a BMP closely  Postictal confusion/encephalopathy seems to be resolving slowly.   After discussing with her especially her insomnia concerns will discontinue the Seroquel to as needed.  She understands that she will not be driving at least for 3 months till cleared by neurology  She is otherwise doing well and plans to discharge back to her apartment and then moved to independent living apartment.  Her Tate balance score is 45/56 and a tugs score is 11.  Cognitively she is much improved with a slums of 24 and a CPT of 5.    Total time spent was 35 minutes, more than half of it was in face-to-face counseling regarding disease state, treatment, side effects, documentation, review of clinical data and coordination of care  Electronically signed by: BLAKE Lee  This progress note was completed using Dragon software and there may be grammatical errors.

## 2021-06-12 NOTE — PROGRESS NOTES
SUBJECTIVE: Elvira Bush is a 77 y.o.  female who presents today with her son to establish care and for follow-up of an inpatient hospitalization at [facility] from 7/26 through 8/1/17.  She was last seen by Dr. Haley for full physical on 6/20/17.  At that time she was found to be in atrial fibrillation.  An echocardiogram was done showing some valve regurgitation but otherwise looked quite normal.  On the morning of admission she was found to have altered mental status with some slurring of speech and changes on her left side particularly her hand and foot.  She was brought in by ambulance.  They thought perhaps she was having a stroke.  She had an MRI which showed a chronic infarct of the right temporal and parietal areas.  Her CTA showed encephalomalacia of the right temporal area.  Neurology was called in and she was seen by Rogelio Tomas and Nas during her hospitalization.  They did an EEG and found nonspecific slowing and she was diagnosed as having complex partial seizure.  During her hospitalization she had to be intubated because of this.  She was started on Keppra 750 mg twice daily and was later switched to 500 mg twice daily.  Notably she was discharged on 750 mg twice daily and she is uncertain of what her dose should be.  We discussed that we will get a Keppra level to see where she is at.  She does have a follow-up with Dr. it bowel in 6 weeks time.  She was started on anticoagulation with a goal INR of 2-2.5.  She is also unsure of exactly how she is taking these medications.  She does seem to have some confusion and I am unaware of what her baseline is.  During her hospitalization she was treated for a UTI.  She was discharged to [facility] and had a week of physical therapy.    She has a past medical history significant for breast cancer for which she sees Dr. Wu or his PA every 6 months.  She has hypertension and hyperlipidemia as well as osteopenia.  It is noted  that the patient does not seem aware of what medications affect Coumadin, INRs.  Son also has not been educated as far as I can tell.  We discussed this a bit and we discuss a referral to Coumadin nursing where this education can continue to happen.  I discussed how the anticoagulation monitoring system works.    Review of Systems   History obtained from son and the patient  General ROS: positive for  - fatigue and decreased appetite  Psychological ROS: positive for - memory difficulties  Ophthalmic ROS: negative  ENT ROS: negative  Allergy and Immunology ROS: negative  Hematological and Lymphatic ROS: negative  Endocrine ROS: negative  Breast ROS: positive for -breast cancer history  Respiratory ROS: negative  Cardiovascular ROS: positive for - dyspnea on exertion  Gastrointestinal ROS: positive for - appetite loss and mild nausea  Genito-Urinary ROS: positive for - nocturia and urinary frequency/urgency  Musculoskeletal ROS: negative  Neurological ROS: positive for - confusion, memory loss, seizures and weakness  Dermatological ROS: negative    OBJECTIVE: /88  Pulse 78  Wt 151 lb 1.6 oz (68.5 kg)  BMI 25.14 kg/m2  General: Normal weight elderly female in no acute distress  Heart: Irregularly irregular, no murmur noted  Lungs: Clear but somewhat decreased effort  Abdomen: Soft, nontender  Extremities: Warm, dry and without edema  Neuro: No focal deficits noted, some memory or confusion is noted with this patient    ASSESSMENT & PLAN:    1. Partial symptomatic epilepsy with complex partial seizures, not intractable, without status epilepticus  Levetiracetam [Keppra ]   2. Chronic a-fib  INR    Ambulatory referral to Anticoagulation Monitoring   3. Essential hypertension with goal blood pressure less than 140/90     4. Essential Hypercholesterolemia     5. Osteopenia  Vitamin D, Total (25-Hydroxy)   6. Encounter to establish care with new doctor       I believe this patient might have early dementia which  has not ultimately been diagnosed.  I will get a Keppra level and get that faxed to Dr. Tomas's office so we can clarify the appropriate dosing of her Keppra.  Unfortunately she does not have follow-up for a number of weeks.  We will get an INR today and patient tried to fill out what she has been on dose wise previously but is unsure of the dosing.  I will do her first adjustment of dosing and am referring her to the anticoagulation monitoring for future dosing.  I also will get a vitamin D level as she has not had this done previously.  Other labs during her hospitalization looked good.  I did write some general notes down for the patient on anticoagulation INR goals and about Coumadin nursing and the fact that they will instruct her.  I also wrote about the Keppra level and how to follow-up on this.  I would like to see her back in a couple months time.    Patient Active Problem List   Diagnosis     Osteopenia     Esophageal Reflux     Breast cancer of lower-outer quadrant of left female breast     Essential Hypercholesterolemia     Essential Hypertension     Obesity     Headache     Elevated LFTs     Meningioma     Chronic atrial fibrillation     Acute respiratory failure, unspecified whether with hypoxia or hypercapnia     Convulsions, unspecified convulsion type     Increased tracheal secretions     Acute cystitis without hematuria     Pulmonary congestion     Encephalopathy     Transient alteration of awareness     Epilepsy with partial complex seizures, without status epilepticus     Chronic a-fib       Current Outpatient Prescriptions on File Prior to Visit   Medication Sig Dispense Refill     aspirin 81 MG tablet Take 81 mg by mouth daily.       atenolol (TENORMIN) 100 MG tablet Take 100 mg by mouth daily.       atorvastatin (LIPITOR) 20 MG tablet Take 20 mg by mouth daily.       calcium-vitamin D (CALCIUM-VITAMIN D) 500 mg(1,250mg) -200 unit per tablet Take 1 tablet by mouth daily.        hydroCHLOROthiazide (HYDRODIURIL) 25 MG tablet Take 25 mg by mouth daily.       levETIRAcetam (KEPPRA) 750 MG tablet Take 1 tablet (750 mg total) by mouth 2 (two) times a day. 60 tablet 0     magnesium oxide (MAG-OX) 400 mg tablet Take 1 tablet (400 mg total) by mouth daily.  0     multivitamin (MULTIVITAMIN) per tablet Take 1 tablet by mouth daily.       omeprazole (PRILOSEC) 20 MG capsule Take 20 mg by mouth daily before breakfast.       QUEtiapine (SEROQUEL) 25 MG tablet Take 0.5 tablets (12.5 mg total) by mouth at bedtime. 30 tablet 0     No current facility-administered medications on file prior to visit.

## 2021-06-12 NOTE — PROGRESS NOTES
[facility]      Code Status:  FULL CODE  Visit Type: H & P     Facility:  [facility] [[id number]]           History of Present Illness: Elvira Bush is a 77 y.o. female who is currently admitted to the TCU as a transfer from the hospital.  This is a 77-year-old previously healthy female with underlying history of A. fib as well as a history of breast cancer in 2014 along with hypertension who was brought into the emergency room when she was driving by a friend with acute onset of slurring of speech and left-sided weakness including left hand and left foot with confusion while in the emergency room.  Patient became unresponsive and started having seizures.   she underwent an emergent CTA to rule out CVA.  She was intubated and subsequently extubated on 7/29/17.  Neurology saw this patient for complex partial seizures with negative MRI for any acute ischemia.  EEG did not show any specific finding other than nonspecific slowing and based on the recommendation she is currently on Keppra with advice to do an outpatient follow-up in 6-8 weeks  Her confusion seems to be resolving and she is becoming alert every day as per her.  Her left-sided weakness of the hand and foot seems to be resolving and she is ambulating using a walker.  In addition postictal course complicated by acute hypoxic respiratory failure requiring intubation as well as stridor for which she was given steroids likely related to mild upper airway edema as per ENT.  Both these issues have completely resolved  Was also noted to have a UTI and remains on treatment.  In addition she has chronic A. fib and remains asymptomatic.  Based on her risk profile she has been started on anticoagulation and her INR will be checked tomorrow she lives alone and no longer feels comfortable discharging to her home she is looking at independent living  Apartment to discharge      Past Medical History:   Diagnosis Date     Atrial  fibrillation      Breast cancer 3-2014     GERD (gastroesophageal reflux disease)      HTN (hypertension)      Hx of radiation therapy 5-2014    Left Breast     Hypercholesterolemia      Osteopenia      Skin cancer      Past Surgical History:   Procedure Laterality Date     BREAST LUMPECTOMY Left 3-2014     KNEE ARTHROSCOPY Right 2009     Psychiatric  7/27/2017          SKIN CANCER EXCISION       TENDON RELEASE       Family History   Problem Relation Age of Onset     Coronary artery disease Mother      No Medical Problems Daughter      Stroke Maternal Grandmother      Prostate cancer Maternal Grandfather      Cerebral aneurysm Maternal Aunt      No Medical Problems Paternal Aunt      Skin cancer Son      BRCA 1/2 Neg Hx      Breast cancer Neg Hx      Cancer Neg Hx      Colon cancer Neg Hx      Endometrial cancer Neg Hx      Ovarian cancer Neg Hx      Social History     Social History     Marital status:      Spouse name: N/A     Number of children: N/A     Years of education: N/A     Occupational History     Not on file.     Social History Main Topics     Smoking status: Never Smoker     Smokeless tobacco: Never Used     Alcohol use 1.2 oz/week     2 Glasses of wine per week     Drug use: No     Sexual activity: Not on file     Other Topics Concern     Not on file     Social History Narrative     Current Outpatient Prescriptions   Medication Sig Dispense Refill     aspirin 81 MG tablet Take 81 mg by mouth daily.       atenolol (TENORMIN) 100 MG tablet Take 100 mg by mouth daily.       atorvastatin (LIPITOR) 20 MG tablet Take 20 mg by mouth daily.       calcium-vitamin D (CALCIUM-VITAMIN D) 500 mg(1,250mg) -200 unit per tablet Take 1 tablet by mouth daily.       cefdinir (OMNICEF) 300 MG capsule Take 1 capsule (300 mg total) by mouth 2 (two) times a day for 5 days. 10 capsule 0     hydroCHLOROthiazide (HYDRODIURIL) 25 MG tablet Take 25 mg by mouth daily.       levETIRAcetam (KEPPRA) 750 MG tablet Take 1 tablet (750  mg total) by mouth 2 (two) times a day. 60 tablet 0     magnesium oxide (MAG-OX) 400 mg tablet Take 1 tablet (400 mg total) by mouth daily.  0     multivitamin (MULTIVITAMIN) per tablet Take 1 tablet by mouth daily.       omeprazole (PRILOSEC) 20 MG capsule Take 20 mg by mouth daily before breakfast.       QUEtiapine (SEROQUEL) 25 MG tablet Take 0.5 tablets (12.5 mg total) by mouth at bedtime. 30 tablet 0     warfarin (COUMADIN) 5 MG tablet Take 5mg by mouth daily 8/2/17 and 8/3/17. Adjust dose based on INR results 8/4/17. Goal INR 2-2.5 FOR ATRIAL FIBRILLATION 15 tablet 0     No current facility-administered medications for this visit.      No Known Allergies      Review of Systems:    Constitutional: Negative.  Negative for fever, chills, has activity change, appetite change and fatigue.   HENT: Negative for congestion and facial swelling.    Eyes: Negative for photophobia, redness and visual disturbance.   Respiratory: Negative for cough and chest tightness.    Cardiovascular: Negative for chest pain, palpitations and leg swelling.   Gastrointestinal: Negative for nausea, diarrhea, constipation, blood in stool and abdominal distention.   Genitourinary: Negative.    Musculoskeletal: Negative.    Right foot pain due to recent surgery with some swelling associated  Skin: Negative.    Neurological: Negative for dizziness, tremors, syncope, weakness, light-headedness and headaches.   Hematological: Does not bruise/bleed easily.   Psychiatric/Behavioral: Negative.      Vitals:    08/03/17 1251   BP: 160/85   Pulse: 89   Temp: 98  F (36.7  C)       Physical Exam:    GENERAL: no acute distress. Cooperative in conversation.   HEENT: pupils are equal, round and reactive. Oral mucosa is moist and intact.  RESP:Chest symmetric. Regular respiratory rate. No stridor.  CVS: S1S2.  irRegular heart rate  ABD: Nondistended, soft.  EXTREMITIES: No lower extremity edema.   NEURO: non focal. Alert and oriented x3.   PSYCH: within  normal limits. No depression or anxiety.  SKIN: warm dry intact           Labs:    Recent Results (from the past 240 hour(s))   POCT creatinine   Result Value Ref Range    POC Creatinine 1.0 mg/dL   POCT GFR   Result Value Ref Range    POC GFR AMER AF HE >60  >60 mL/min/1.73m2    POC GFR NON AMER AF 53  (!) >60 mL/min/1.73m2   INR   Result Value Ref Range    INR 1.05 0.90 - 1.10   APTT(PTT)   Result Value Ref Range    PTT 30 24 - 37 seconds   Basic Metabolic Panel   Result Value Ref Range    Sodium 140 136 - 145 mmol/L    Potassium 3.3 (L) 3.5 - 5.0 mmol/L    Chloride 105 98 - 107 mmol/L    CO2 28 22 - 31 mmol/L    Anion Gap, Calculation 7 5 - 18 mmol/L    Glucose 165 (H) 70 - 125 mg/dL    Calcium 8.9 8.5 - 10.5 mg/dL    BUN 22 8 - 28 mg/dL    Creatinine 1.08 0.60 - 1.10 mg/dL    GFR MDRD Af Amer 59 (L) >60 mL/min/1.73m2    GFR MDRD Non Af Amer 49 (L) >60 mL/min/1.73m2   HM2(CBC w/o Differential)   Result Value Ref Range    WBC 9.6 4.0 - 11.0 thou/uL    RBC 4.39 3.80 - 5.40 mill/uL    Hemoglobin 13.0 12.0 - 16.0 g/dL    Hematocrit 39.0 35.0 - 47.0 %    MCV 89 80 - 100 fL    MCH 29.6 27.0 - 34.0 pg    MCHC 33.3 32.0 - 36.0 g/dL    RDW 13.2 11.0 - 14.5 %    Platelets 191 140 - 440 thou/uL    MPV 10.0 8.5 - 12.5 fL   Troponin I   Result Value Ref Range    Troponin I <0.01 0.00 - 0.29 ng/mL   Alcohol, Ethyl, Blood   Result Value Ref Range    Alcohol, Blood <10 None detected mg/dL   ECG 12 lead nursing unit performed   Result Value Ref Range    SYSTOLIC BLOOD PRESSURE  mmHg    DIASTOLIC BLOOD PRESSURE  mmHg    VENTRICULAR RATE 111 BPM    ATRIAL RATE 111 BPM    P-R INTERVAL 142 ms    QRS DURATION 78 ms    Q-T INTERVAL 314 ms    QTC CALCULATION (BEZET) 427 ms    P Axis 82 degrees    R AXIS -1 degrees    T AXIS 38 degrees    MUSE DIAGNOSIS       Atrial flutter  Possible Anterior infarct (cited on or before 20-JUN-2017)  Inferior infarct  Abnormal ECG  When compared with ECG of 20-JUN-2017 10:08,  Vent. rate has increased BY   37 BPM  ST no longer depressed in Anterior leads  Nonspecific T wave abnormality, improved in Anterior leads  Confirmed by PEE WISEMAN MD LOC: ([id number]) on 7/27/2017 2:53:56 PM     POCT Glucose   Result Value Ref Range    Glucose,  mg/dL   Urinalysis-UC if Indicated   Result Value Ref Range    Color, UA Yellow Colorless, Yellow, Straw, Light Yellow    Clarity, UA Clear Clear    Glucose, UA Negative Negative    Bilirubin, UA Negative Negative    Ketones, UA Negative Negative    Specific Gravity, UA 1.016 1.001 - 1.030    Blood, UA Negative Negative    pH, UA 6.0 4.5 - 8.0    Protein, UA Trace (!) Negative mg/dL    Urobilinogen, UA <2.0 E.U./dL <2.0 E.U./dL, 2.0 E.U./dL    Nitrite, UA Negative Negative    Leukocytes, UA Negative Negative    Bacteria, UA Few (!) None Seen hpf    RBC, UA 0-2 None Seen, 0-2 hpf    WBC, UA 0-5 None Seen, 0-5 hpf    Squam Epithel, UA 0-5 None Seen, 0-5 lpf    Mucus, UA Few (!) None Seen lpf   Drug Abuse 1+, Urine (DAM) with Methadone   Result Value Ref Range    Amphetamines Screen Negative Screen Negative    Benzodiazepines Screen Negative Screen Negative    Opiates Screen Negative Screen Negative    Phencyclidine Screen Negative Screen Negative    THC Screen Negative Screen Negative    Barbiturates Screen Negative Screen Negative    Cocaine Metabolite Screen Negative Screen Negative    Methadone Screen Negative Screen Negative    Oxycodone Screen Negative Screen Negative    Creatinine, Urine 81.4 mg/dL   Blood Gases, Arterial   Result Value Ref Range    pH, Arterial 7.27 (L) 7.37 - 7.44    pCO2, Arterial 48 (H) 35 - 45 mm Hg    pO2, Arterial 138 (H) 75 - 85 mm Hg    Bicarbonate, Arterial Calc 21.0 (L) 23.0 - 29.0 mmol/L    O2 Sat, Arterial 100.0 (H) 95.0 - 96.0 %    Oxyhemoglobin 97.9 (H) 95.0 - 96.0 %    Base Excess, Arterial Calc -5.0 mmol/L    Ventilation Mode VCV     Rate 16 rr/min    FIO2 0.80     Peep 5 cm H2O    Sample Stabilized Temperature 37.0 degrees C    Ventilator  Tidal Volume 400 mL   POCT Glucose   Result Value Ref Range    Glucose,  mg/dL   Blood Gases, Arterial   Result Value Ref Range    pH, Arterial 7.55 (H) 7.37 - 7.44    pCO2, Arterial 25 (L) 35 - 45 mm Hg    pO2, Arterial 163 (H) 75 - 85 mm Hg    Bicarbonate, Arterial Calc 25.5 23.0 - 29.0 mmol/L    O2 Sat, Arterial 100.0 (H) 95.0 - 96.0 %    Oxyhemoglobin 98.3 (H) 95.0 - 96.0 %    Base Excess, Arterial Calc 0.7 mmol/L    Ventilation Mode VCV     Rate 18 rr/min    FIO2 0.50     Peep 5 cm H2O    Sample Stabilized Temperature 37.0 degrees C    Ventilator Tidal Volume 400 mL   Magnesium   Result Value Ref Range    Magnesium 2.0 1.8 - 2.6 mg/dL   Phosphorus Level   Result Value Ref Range    Phosphorus 2.4 (L) 2.5 - 4.5 mg/dL   Blood Gases, Venous   Result Value Ref Range    pH, Venous 7.43 7.35 - 7.45    pCO2, Venous 35 35 - 50 mm Hg    pO2, Net[place] 195 (H) 25 - 47 mm Hg    Base Excess, Venous -0.7 mmol/L    HCO3, Venous 24.4 24.0 - 30.0 mmol/L    Oxyhemoglobin 96.6 (H) 70.0 - 75.0 %    O2 Sat, Venous 100.0 (H) 70.0 - 75.0 %   POCT Glucose   Result Value Ref Range    Glucose,  mg/dL   Basic Metabolic Panel   Result Value Ref Range    Sodium 143 136 - 145 mmol/L    Potassium 3.5 3.5 - 5.0 mmol/L    Chloride 113 (H) 98 - 107 mmol/L    CO2 21 (L) 22 - 31 mmol/L    Anion Gap, Calculation 9 5 - 18 mmol/L    Glucose 103 70 - 125 mg/dL    Calcium 8.6 8.5 - 10.5 mg/dL    BUN 20 8 - 28 mg/dL    Creatinine 0.87 0.60 - 1.10 mg/dL    GFR MDRD Af Amer >60 >60 mL/min/1.73m2    GFR MDRD Non Af Amer >60 >60 mL/min/1.73m2   Magnesium   Result Value Ref Range    Magnesium 1.7 (L) 1.8 - 2.6 mg/dL   HM1 (CBC with Diff)   Result Value Ref Range    WBC 10.8 4.0 - 11.0 thou/uL    RBC 4.00 3.80 - 5.40 mill/uL    Hemoglobin 11.7 (L) 12.0 - 16.0 g/dL    Hematocrit 35.1 35.0 - 47.0 %    MCV 88 80 - 100 fL    MCH 29.3 27.0 - 34.0 pg    MCHC 33.3 32.0 - 36.0 g/dL    RDW 13.5 11.0 - 14.5 %    Platelets 139 (L) 140 - 440 thou/uL    MPV 10.6  8.5 - 12.5 fL    Neutrophils % 76 (H) 50 - 70 %    Lymphocytes % 15 (L) 20 - 40 %    Monocytes % 9 2 - 10 %    Eosinophils % 0 0 - 6 %    Basophils % 0 0 - 2 %    Neutrophils Absolute 8.1 (H) 2.0 - 7.7 thou/uL    Lymphocytes Absolute 1.6 0.8 - 4.4 thou/uL    Monocytes Absolute 1.0 (H) 0.0 - 0.9 thou/uL    Eosinophils Absolute 0.0 0.0 - 0.4 thou/uL    Basophils Absolute 0.0 0.0 - 0.2 thou/uL   POCT Glucose   Result Value Ref Range    Glucose,  mg/dL   Prolactin   Result Value Ref Range    Prolactin 7.3 0.0 - 20.0 ng/mL   POCT Glucose   Result Value Ref Range    Glucose,  mg/dL   POCT Glucose   Result Value Ref Range    Glucose,  mg/dL   Potassium   Result Value Ref Range    Potassium 3.9 3.5 - 5.0 mmol/L   POCT Glucose   Result Value Ref Range    Glucose,  mg/dL   POCT Glucose   Result Value Ref Range    Glucose,  mg/dL   POCT Glucose   Result Value Ref Range    Glucose,  mg/dL   Magnesium   Result Value Ref Range    Magnesium 2.6 1.8 - 2.6 mg/dL   HM2(CBC w/o Differential)   Result Value Ref Range    WBC 9.6 4.0 - 11.0 thou/uL    RBC 3.89 3.80 - 5.40 mill/uL    Hemoglobin 11.5 (L) 12.0 - 16.0 g/dL    Hematocrit 33.8 (L) 35.0 - 47.0 %    MCV 87 80 - 100 fL    MCH 29.6 27.0 - 34.0 pg    MCHC 34.0 32.0 - 36.0 g/dL    RDW 14.0 11.0 - 14.5 %    Platelets 136 (L) 140 - 440 thou/uL    MPV 10.6 8.5 - 12.5 fL   Basic Metabolic Panel   Result Value Ref Range    Sodium 141 136 - 145 mmol/L    Potassium 3.7 3.5 - 5.0 mmol/L    Chloride 112 (H) 98 - 107 mmol/L    CO2 21 (L) 22 - 31 mmol/L    Anion Gap, Calculation 8 5 - 18 mmol/L    Glucose 102 70 - 125 mg/dL    Calcium 8.2 (L) 8.5 - 10.5 mg/dL    BUN 14 8 - 28 mg/dL    Creatinine 0.81 0.60 - 1.10 mg/dL    GFR MDRD Af Amer >60 >60 mL/min/1.73m2    GFR MDRD Non Af Amer >60 >60 mL/min/1.73m2   POCT Glucose   Result Value Ref Range    Glucose,  mg/dL   Potassium - Next AM   Result Value Ref Range    Potassium 3.7 3.5 - 5.0 mmol/L    Magnesium   Result Value Ref Range    Magnesium 1.9 1.8 - 2.6 mg/dL   Phosphorus   Result Value Ref Range    Phosphorus 2.6 2.5 - 4.5 mg/dL   CK Total   Result Value Ref Range    CK, Total 150 30 - 190 U/L   Sputum culture   Result Value Ref Range    Culture Usual dipti with     Culture 4+ Staphylococcus aureus (!)     Gram Stain Result 3+ Polymorphonuclear leukocytes     Gram Stain Result 4+ Gram positive cocci in pairs     Gram Stain Result 2+ Gram positive cocci in chains        Susceptibility    Staphylococcus aureus - ALVAREZ     Clindamycin <=0.5 Sensitive      Cefazolin <=2 Sensitive      Doxycycline <=0.5 Sensitive      Erythromycin <=0.5 Sensitive      Levofloxacin <=0.5 Sensitive      Oxacillin 0.5 Sensitive      Trimethoprim + Sulfamethoxazole <=1/19 Sensitive      Vancomycin 1 Sensitive    Triglycerides   Result Value Ref Range    Triglycerides 113 <=149 mg/dL    Patient Fasting > 8hrs? Yes    Basic Metabolic Panel   Result Value Ref Range    Sodium 141 136 - 145 mmol/L    Potassium 4.4 3.5 - 5.0 mmol/L    Chloride 116 (H) 98 - 107 mmol/L    CO2 16 (L) 22 - 31 mmol/L    Anion Gap, Calculation 9 5 - 18 mmol/L    Glucose 133 (H) 70 - 125 mg/dL    Calcium 8.5 8.5 - 10.5 mg/dL    BUN 16 8 - 28 mg/dL    Creatinine 0.77 0.60 - 1.10 mg/dL    GFR MDRD Af Amer >60 >60 mL/min/1.73m2    GFR MDRD Non Af Amer >60 >60 mL/min/1.73m2   Magnesium   Result Value Ref Range    Magnesium 1.8 1.8 - 2.6 mg/dL   Hepatic Profile   Result Value Ref Range    Bilirubin, Total 1.1 (H) 0.0 - 1.0 mg/dL    Bilirubin, Direct 0.5 <=0.5 mg/dL    Protein, Total 6.2 6.0 - 8.0 g/dL    Albumin 2.8 (L) 3.5 - 5.0 g/dL    Alkaline Phosphatase 73 45 - 120 U/L    AST 33 0 - 40 U/L    ALT 40 0 - 45 U/L   HM2(CBC W/O DIFF)   Result Value Ref Range    WBC 12.9 (H) 4.0 - 11.0 thou/uL    RBC 3.71 (L) 3.80 - 5.40 mill/uL    Hemoglobin 10.8 (L) 12.0 - 16.0 g/dL    Hematocrit 33.0 (L) 35.0 - 47.0 %    MCV 89 80 - 100 fL    MCH 29.1 27.0 - 34.0 pg    MCHC  32.7 32.0 - 36.0 g/dL    RDW 14.0 11.0 - 14.5 %    Platelets 138 (L) 140 - 440 thou/uL    MPV 10.6 8.5 - 12.5 fL   Urinalysis-UC if Indicated   Result Value Ref Range    Color, UA Brown (!) Colorless, Yellow, Straw, Light Yellow    Clarity, UA Turbid (!) Clear    Glucose, UA Negative Negative    Bilirubin, UA Negative Negative    Ketones, UA 40 mg/dL (!) Negative    Specific Gravity, UA 1.024 1.001 - 1.030    Blood, UA Large (!) Negative    pH, UA 5.5 4.5 - 8.0    Protein,  mg/dL (!) Negative mg/dL    Urobilinogen, UA <2.0 E.U./dL <2.0 E.U./dL, 2.0 E.U./dL    Nitrite, UA Negative Negative    Leukocytes, UA Moderate (!) Negative    Bacteria, UA Moderate (!) None Seen hpf    RBC, UA >100 (!) None Seen, 0-2 hpf    WBC, UA 10-25 (!) None Seen, 0-5 hpf    Squam Epithel, UA 0-5 None Seen, 0-5 lpf    Mucus, UA Moderate (!) None Seen lpf   Culture, Urine   Result Value Ref Range    Culture >100,000 col/ml Klebsiella pneumoniae (!)        Susceptibility    Klebsiella pneumoniae - ALVAREZ     Amoxicillin + Clavulanate <=4/2 Sensitive      Ampicillin 16 Resistant      Cefazolin <=1 Sensitive      Cefepime <=1 Sensitive      Ceftriaxone <=1 Sensitive      Ciprofloxacin <=0.5 Sensitive      Gentamicin <=2 Sensitive      Levofloxacin <=1 Sensitive      Meropenem <=0.25 Sensitive      Nitrofurantoin >64 Resistant      Tetracycline <=2 Sensitive      Tobramycin <=2 Sensitive      Trimethoprim + Sulfamethoxazole <=0.5 Sensitive    Levetiracetam [Keppra ]   Result Value Ref Range    Levetiracetam 18.7 6.0 - 46.0 ug/mL   Phosphorus Level > 2.4 no replacement required   Result Value Ref Range    Phosphorus 2.5 2.5 - 4.5 mg/dL   Comprehensive Metabolic Panel   Result Value Ref Range    Sodium 141 136 - 145 mmol/L    Potassium 4.0 3.5 - 5.0 mmol/L    Chloride 117 (H) 98 - 107 mmol/L    CO2 17 (L) 22 - 31 mmol/L    Anion Gap, Calculation 7 5 - 18 mmol/L    Glucose 109 70 - 125 mg/dL    BUN 19 8 - 28 mg/dL    Creatinine 0.70 0.60 - 1.10  mg/dL    GFR MDRD Af Amer >60 >60 mL/min/1.73m2    GFR MDRD Non Af Amer >60 >60 mL/min/1.73m2    Bilirubin, Total 0.7 0.0 - 1.0 mg/dL    Calcium 7.8 (L) 8.5 - 10.5 mg/dL    Protein, Total 5.0 (L) 6.0 - 8.0 g/dL    Albumin 2.4 (L) 3.5 - 5.0 g/dL    Alkaline Phosphatase 67 45 - 120 U/L    AST 34 0 - 40 U/L    ALT 40 0 - 45 U/L   Magnesium   Result Value Ref Range    Magnesium 2.1 1.8 - 2.6 mg/dL   HM1 (CBC with Diff)   Result Value Ref Range    WBC 6.4 4.0 - 11.0 thou/uL    RBC 3.18 (L) 3.80 - 5.40 mill/uL    Hemoglobin 9.5 (L) 12.0 - 16.0 g/dL    Hematocrit 28.3 (L) 35.0 - 47.0 %    MCV 89 80 - 100 fL    MCH 29.9 27.0 - 34.0 pg    MCHC 33.6 32.0 - 36.0 g/dL    RDW 14.0 11.0 - 14.5 %    Platelets 111 (L) 140 - 440 thou/uL    MPV 10.7 8.5 - 12.5 fL    Neutrophils % 78 (H) 50 - 70 %    Lymphocytes % 14 (L) 20 - 40 %    Monocytes % 8 2 - 10 %    Eosinophils % 0 0 - 6 %    Basophils % 0 0 - 2 %    Neutrophils Absolute 5.0 2.0 - 7.7 thou/uL    Lymphocytes Absolute 0.9 0.8 - 4.4 thou/uL    Monocytes Absolute 0.5 0.0 - 0.9 thou/uL    Eosinophils Absolute 0.0 0.0 - 0.4 thou/uL    Basophils Absolute 0.0 0.0 - 0.2 thou/uL   Blood Gases, Arterial   Result Value Ref Range    pH, Arterial 7.41 7.37 - 7.44    pCO2, Arterial 23 (L) 35 - 45 mm Hg    pO2, Arterial 120 (H) 75 - 85 mm Hg    Bicarbonate, Arterial Calc 18.4 (L) 23.0 - 29.0 mmol/L    O2 Sat, Arterial 99.6 (H) 95.0 - 96.0 %    Oxyhemoglobin 97.1 (H) 95.0 - 96.0 %    Base Excess, Arterial Calc -8.4 mmol/L    Ventilation Mode Nasal cannula     Flow 3.0 LPM    Sample Stabilized Temperature 37.0 degrees C   Basic Metabolic Panel   Result Value Ref Range    Sodium 145 136 - 145 mmol/L    Potassium 3.3 (L) 3.5 - 5.0 mmol/L    Chloride 115 (H) 98 - 107 mmol/L    CO2 18 (L) 22 - 31 mmol/L    Anion Gap, Calculation 12 5 - 18 mmol/L    Glucose 100 70 - 125 mg/dL    Calcium 8.9 8.5 - 10.5 mg/dL    BUN 24 8 - 28 mg/dL    Creatinine 0.87 0.60 - 1.10 mg/dL    GFR MDRD Af Amer >60 >60  mL/min/1.73m2    GFR MDRD Non Af Amer >60 >60 mL/min/1.73m2   Magnesium   Result Value Ref Range    Magnesium 1.8 1.8 - 2.6 mg/dL   Phosphorus Level > 2.4 no replacement required   Result Value Ref Range    Phosphorus 2.7 2.5 - 4.5 mg/dL   HM1 (CBC with Diff)   Result Value Ref Range    WBC 9.7 4.0 - 11.0 thou/uL    RBC 3.49 (L) 3.80 - 5.40 mill/uL    Hemoglobin 10.2 (L) 12.0 - 16.0 g/dL    Hematocrit 30.9 (L) 35.0 - 47.0 %    MCV 89 80 - 100 fL    MCH 29.2 27.0 - 34.0 pg    MCHC 33.0 32.0 - 36.0 g/dL    RDW 14.5 11.0 - 14.5 %    Platelets 171 140 - 440 thou/uL    MPV 10.5 8.5 - 12.5 fL    Neutrophils % 84 (H) 50 - 70 %    Lymphocytes % 10 (L) 20 - 40 %    Monocytes % 7 2 - 10 %    Eosinophils % 0 0 - 6 %    Basophils % 0 0 - 2 %    Neutrophils Absolute 8.1 (H) 2.0 - 7.7 thou/uL    Lymphocytes Absolute 0.9 0.8 - 4.4 thou/uL    Monocytes Absolute 0.7 0.0 - 0.9 thou/uL    Eosinophils Absolute 0.0 0.0 - 0.4 thou/uL    Basophils Absolute 0.0 0.0 - 0.2 thou/uL   Potassium   Result Value Ref Range    Potassium 4.0 3.5 - 5.0 mmol/L   Phosphorus   Result Value Ref Range    Phosphorus 2.3 (L) 2.5 - 4.5 mg/dL   Basic Metabolic Panel   Result Value Ref Range    Sodium 145 136 - 145 mmol/L    Potassium 3.5 3.5 - 5.0 mmol/L    Chloride 114 (H) 98 - 107 mmol/L    CO2 20 (L) 22 - 31 mmol/L    Anion Gap, Calculation 11 5 - 18 mmol/L    Glucose 106 70 - 125 mg/dL    Calcium 9.3 8.5 - 10.5 mg/dL    BUN 28 8 - 28 mg/dL    Creatinine 0.88 0.60 - 1.10 mg/dL    GFR MDRD Af Amer >60 >60 mL/min/1.73m2    GFR MDRD Non Af Amer >60 >60 mL/min/1.73m2   Magnesium   Result Value Ref Range    Magnesium 1.9 1.8 - 2.6 mg/dL   HM2(CBC W/O DIFF)   Result Value Ref Range    WBC 10.3 4.0 - 11.0 thou/uL    RBC 3.68 (L) 3.80 - 5.40 mill/uL    Hemoglobin 10.9 (L) 12.0 - 16.0 g/dL    Hematocrit 32.3 (L) 35.0 - 47.0 %    MCV 88 80 - 100 fL    MCH 29.6 27.0 - 34.0 pg    MCHC 33.7 32.0 - 36.0 g/dL    RDW 14.3 11.0 - 14.5 %    Platelets 200 140 - 440 thou/uL     MPV 10.0 8.5 - 12.5 fL   Phosphorus Level > 2.4 no replacement required   Result Value Ref Range    Phosphorus 2.2 (L) 2.5 - 4.5 mg/dL   POCT Glucose   Result Value Ref Range    Glucose, POC 89 mg/dL   POCT Glucose   Result Value Ref Range    Glucose,  mg/dL   Potassium   Result Value Ref Range    Potassium 4.2 3.5 - 5.0 mmol/L   Protime-INR   Result Value Ref Range    INR 1.10 0.90 - 1.10   Phosphorus   Result Value Ref Range    Phosphorus 3.9 2.5 - 4.5 mg/dL   Basic Metabolic Panel   Result Value Ref Range    Sodium 146 (H) 136 - 145 mmol/L    Potassium 3.7 3.5 - 5.0 mmol/L    Chloride 116 (H) 98 - 107 mmol/L    CO2 24 22 - 31 mmol/L    Anion Gap, Calculation 6 5 - 18 mmol/L    Glucose 86 70 - 125 mg/dL    Calcium 8.8 8.5 - 10.5 mg/dL    BUN 25 8 - 28 mg/dL    Creatinine 0.83 0.60 - 1.10 mg/dL    GFR MDRD Af Amer >60 >60 mL/min/1.73m2    GFR MDRD Non Af Amer >60 >60 mL/min/1.73m2   Magnesium   Result Value Ref Range    Magnesium 1.6 (L) 1.8 - 2.6 mg/dL   HM2(CBC W/O DIFF)   Result Value Ref Range    WBC 7.7 4.0 - 11.0 thou/uL    RBC 3.58 (L) 3.80 - 5.40 mill/uL    Hemoglobin 10.6 (L) 12.0 - 16.0 g/dL    Hematocrit 31.9 (L) 35.0 - 47.0 %    MCV 89 80 - 100 fL    MCH 29.6 27.0 - 34.0 pg    MCHC 33.2 32.0 - 36.0 g/dL    RDW 14.3 11.0 - 14.5 %    Platelets 185 140 - 440 thou/uL    MPV 10.2 8.5 - 12.5 fL   INR   Result Value Ref Range    INR 1.10 0.90 - 1.10     Cta Head And Neck    Result Date: 7/26/2017  [facility] HEAD AND NECK CT ANGIOGRAM WITH IV CONTRAST 7/26/2017 3:28 PM INDICATION: Speech changes (or aphasia), new or progressive TECHNIQUE: Head and neck CT angiogram with IV contrast. Noncontrast head CT followed by axial helical CT images of the head and neck vessels obtained during the arterial phase of intravenous contrast administration. Axial helical 2D reconstructed images and multiplanar 3D MIP reconstructed images of the head and neck vessels were performed by the technologist. Dose  reduction techniques were used. CONTRAST: Iohexol (Omni) 75mL COMPARISON: None. FINDINGS: NONCONTRAST HEAD CT: There is encephalomalacia involving the right temporal lobe with associated volume loss and mild ex vacuo ventricular dilatation of the adjacent atrium of the right lateral ventricle. Additionally, there is a 0.9 x 0.7 cm low-attenuation area in the left basal ganglia (image 20, axial soft tissue series 4). There is mild diffuse parenchymal volume loss with ex vacuo ventricular dilatation. There is periventricular low-attenuation which is nonspecific but may relate to sequela of chronic microvascular ischemic change. There is no shift of the midline structures. There is a 0.4 x 0.4 cm partially calcified lesion along the right aspect of the anterior falx (image 32, series 4). There are no aggressive osseous lesions. No calvarial fracture. The paranasal sinuses and mastoid air cells are well aerated. The visualized portions of the orbits are normal. Irregularity of the nasal bones may relate to age indeterminate nasal bone fractures. HEAD CTA: No significant stenosis or occlusion in the proximal intracranial vessels.  No aneurysm or vascular malformation identified.  The vertebrobasilar system is unremarkable. The major dural venous sinuses are patent. NECK CTA: RIGHT CAROTID: No measurable stenosis in the right ICA based on NASCET criteria. LEFT CAROTID: No measurable stenosis in the left ICA based on NASCET criteria. VERTEBRAL ARTERIES: Balanced vertebral arteries are patent in the neck and into the head. AORTIC ARCH: Classic aortic arch anatomy with no significant stenosis at the origin of the great vessels. MISCELLANEOUS: No evidence for dissection or pseudoaneurysm. The visualized upper chest is unremarkable. The cervical spine is grossly unremarkable.     CONCLUSION: HEAD CTA: 1. Noncontrast head CT demonstrates encephalomalacia involving the right temporal lobe likely relating to a chronic infarct. A  0.9 x 0.7 cm low-attenuation area in the left basal ganglia is nonspecific and may relate to age indeterminate ischemic change (possibly subacute). Alternatively, this may represent an enlarged perivascular space. A 0.4 cm partially calcified lesion along the right aspect of the anterior falx may represent a partially calcified meningioma. Irregularity of the nasal bones is most  consistent with age indeterminate, possibly chronic, nasal bone fractures. 2. Head CTA demonstrates no aneurysm or significant stenosis in the proximal intracranial vessels. NECK CTA: 1. No significant stenosis in the neck vessels based on NASCET criteria. 2. No evidence for dissection. Head CT results called to Dr. Covarrubias at 329pm on 7/26/17. CTA results called to Dr. Covarrubias at 352pm.     Xr Chest Ap Portable    Result Date: 7/30/2017  XR CHEST AP PORTABLE 7/30/2017 5:37 PM INDICATION: sob COMPARISON: 7/30/2017 at 0514 FINDINGS: Right upper extremity PICC tip at cavoatrial junction. Mildly enlarged cardiac silhouette, stable. Persistent but improved left basilar airspace opacification. No new consolidation. No visible pneumothorax.    Xr Chest Ap Portable    Result Date: 7/30/2017  XR CHEST AP PORTABLE 7/30/2017 5:28 AM INDICATION: respiratory failure COMPARISON: 7/29/2017. FINDINGS: The endotracheal and nasogastric tubes have been removed. Right PICC tip projects over the SVC/right atrial junction. Stable heart size which could be enlarged. Persistent but improved left basilar infiltrate. Prominent central pulmonary vessels suggest pulmonary vascular congestion. No pleural effusion seen.  Xr Abdomen Ap Portable    Result Date: 7/26/2017  XR ABDOMEN AP PORTABLE 7/26/2017 5:22 PM INDICATION: NG location COMPARISON: None. FINDINGS: Nasogastric tube tip overlies the stomach. No abnormal bowel gas pattern    Mr Brain With Without Contrast    Result Date: 7/27/2017  HEAD MRI WITHOUT AND WITH IV CONTRAST 7/27/2017 9:09 PM INDICATION: Stroke.  Syncope. Fainting. TECHNIQUE: Head MRI without and with intravenous contrast. CONTRAST: 7.5ml Gadavist COMPARISON: Head and neck CTA 7/26/2017 FINDINGS: There is no restricted diffusion. Mild mucosal thickening within the ethmoid air cells. Paranasal sinuses are otherwise free from significant disease. Patchy fluid within the bilateral mastoid air cells. Intraorbital contents are unremarkable. There is mild generalized prominence of the sulci and ventricles, consistent with underlying volume loss. Intracranial flow voids are intact. There is no mass effect, midline shift, or extraaxial collection. Chronic infarct at the right temporal parietal  junction. This demonstrates minor chronic hemosiderin deposition and laminar necrosis. Left basal ganglia perivascular space. There are scattered foci of T2/FLAIR hyperintensity within the cerebral white matter that are nonspecific but probably reflect the sequela of chronic small vessel ischemic disease. 7 x 4 x 7 mm focus of dural based extra-axial enhancement projecting from the right aspect of the anterior hemispheric fissure.            CONCLUSION: 1.  No acute intracranial finding. No evidence for recent ischemia, intracranial hemorrhage, or hydrocephalus. 2.  7 mm presumed meningioma projecting rightward from the anterior falx. 3.  Chronic infarct right temporoparietal junction. 4.  Age-related changes.    Xr Chest Ap For Picc Placement Portable    Result Date: 7/27/2017  XR CHEST AP FOR PICC LINE PLACEMENT PORTABLE 7/27/2017 11:34 AM INDICATION: verify catheter placement COMPARISON: 7/26/2017 FINDINGS: Endotracheal tube tip terminates 2.0 cm above the jv. Consider retracting 2 to 3 cm for optimal positioning. An NG tube sidehole terminates in the distal esophagus. Recommend advancing at least 10 cm. A new right PICC tip likely terminates near the superior cavoatrial junction. There is a suspected small left pleural effusion. No pneumothorax. No significant edema.  The cardiac mediastinal silhouette is normal in size. There are mild degenerative osseous changes. NOTE: ABNORMAL REPORT THE DICTATION ABOVE DESCRIBES AN ABNORMALITY FOR WHICH FOLLOW-UP IS NEEDED.     Assessment/Plan:    New onset complex partial seizures.  Patient was see by neurology and underwent an EEGwhich showed nonspecific slowing.  MRI was negative for any ischemic changes or acute ischemia currently on Keppra 750 twice daily with advised to follow-up closely with neurology in 6 weeks  Klebsiella UTI currently on antibiotics denies any dysuria   chronic A. fib continue with her beta-blocker  Based On her risk profile and chads 2 vascular score she is currently on Coumadin she will have an INR check done tomorrow.  Echocardiogram done showed a EF of 55% with moderate MR  Acute hypoxic respiratory failure requiring intubation currently resolved.  Stridor felt to be due to mild upper airway edema.  ENT saw her and prescribed steroids currently resolved   Basilar pneumonia-resolved with Rx  Hypertension-Continue with her home dosage of atenolol and hydrochlorothiazide  Hyperlipidemia-on lipitor  History of breast cancer status post radiation treatment currently in remission  Electrolyte imbalance-currently plan will be to check a BMP closely  Postictal confusion/encephalopathy seems to be resolving slowly.  She has been discharged on Seroquel and if she remains stable the plan would be to discontinue that medication next week.  Debilitation she is currently here for PT OT and rehab discharge plan is that she will probably be looking into an independent living apartment  She understands that she will not be driving at least for 3 months till cleared by neurology    Total time spent was 45 minutes, more than half of it was in face-to-face counseling regarding disease state, treatment, side effects, documentation, review of clinical data and coordination of care  Electronically signed by: Elaine Moses  BLAKE      This progress note was completed using Dragon software and there may be grammatical errors.

## 2021-06-12 NOTE — PROGRESS NOTES
SUBJECTIVE: Elvira Bush is a 80 y.o. White or  female who presents today and tells me that she has been doing well.  She feels great.  She does feel isolated due to COVID-19 pandemic rules at [facility].  She is jairo though because she lives on the ground floor and has a patio.  Her kids came to visit her and would sit on the other side of her rail and they would visit frequently over the summer.  Now there is snow.  She has 1 friend who lives next door but she does not really mix with any of the other residents.  She has very elevated blood pressure today.  She has had a history of stroke and has atrial fibrillation.  She needs to get her blood pressure better controlled.  Her heart rate is low at 53 because she is already on maximum atenolol.  She also has hydrochlorothiazide.  I would like to add lisinopril to keep her potassium up.  She says she is already seeing her oncologist in July, recently saw her neurologist and dentist and next week sees her eye doctor followed by a mammogram in November.    OBJECTIVE: /88   Pulse (!) 53   Wt 116 lb (52.6 kg)   SpO2 97%   BMI 20.55 kg/m    General: Well-appearing normal weight octogenarian in no acute distress  Heart: Regular rate and rhythm with 2 out of 6 murmur  Lungs: Decreased breath sounds bilaterally, otherwise clear  Abdomen: Soft, nontender  Extremities: Warm, dry and without edema  Neuro: Cranial nerves II through XII intact and no focal deficits are noted, speech fluent.    ASSESSMENT & PLAN:     1. Essential hypertension  Not controlled.  She has a history of stroke due to her atrial fibrillation.  We need to get this blood pressure down.  I will add lisinopril 20 mg as it is quite an elevated blood pressure.  I will give only 30 days so she can come back and get a nurse only recheck of her blood pressure.  - Comprehensive Metabolic Panel  - lisinopriL (PRINIVIL,ZESTRIL) 20 MG tablet; Take 1 tablet (20 mg total) by mouth daily.   Dispense: 30 tablet; Refill: 0    2. Chronic atrial fibrillation (H)  On warfarin.  Will need monitoring.      3. Essential Hypercholesterolemia  Historically well controlled, will monitor with lab.  - Lipid Jerauld FASTING    4. Osteopenia  Had DEXA scan November 2019.  Has historically always had a good vitamin D level.    5. Localization-related focal epilepsy with simple partial seizures (H)  Recently saw the neurologist.    I will get back to her on her lab results by mail and only call with grossly abnormal values.  We are starting lisinopril 20 mg and she is to come back for a nurse only blood pressure check in a week or 2.  If all is well then she can see me back in 6 months time.    Patient Active Problem List   Diagnosis     Osteopenia     Esophageal reflux     Breast cancer of lower-outer quadrant of left female breast (H)     Essential Hypercholesterolemia     Essential hypertension     Obesity     Headache     Elevated LFTs     Meningioma (H)     Chronic atrial fibrillation (H)     Convulsions, unspecified convulsion type (H)     Encephalomalacia on imaging study     Localization-related focal epilepsy with simple partial seizures (H)     History of left breast cancer       Current Outpatient Medications on File Prior to Visit   Medication Sig Dispense Refill     aspirin 81 MG tablet Take 81 mg by mouth daily.       atenoloL (TENORMIN) 100 MG tablet TAKE 1 TABLET BY MOUTH EVERY DAY 90 tablet 1     atorvastatin (LIPITOR) 20 MG tablet TAKE 1 TABLET BY MOUTH EVERYDAY AT BEDTIME 90 tablet 3     calcium-vitamin D (CALCIUM-VITAMIN D) 500 mg(1,250mg) -200 unit per tablet Take 2 tablets by mouth in the morning and 1 tablet by mouth at night.             hydroCHLOROthiazide (HYDRODIURIL) 25 MG tablet TAKE 1 TABLET BY MOUTH EVERY DAY 90 tablet 3     levETIRAcetam (KEPPRA) 500 MG tablet Take 1,000 mg by mouth.       levETIRAcetam (KEPPRA) 750 MG tablet Take 750 mg by mouth 2 (two) times a day.       magnesium  oxide (MAG-OX) 400 mg tablet Take 1 tablet (400 mg total) by mouth daily.  0     multivitamin (MULTIVITAMIN) per tablet Take 1 tablet by mouth daily.       omeprazole (PRILOSEC) 20 MG capsule TAKE 1 CAPSULE BY MOUTH EVERY DAY 90 capsule 3     warfarin ANTICOAGULANT (COUMADIN/JANTOVEN) 5 MG tablet Take 1 tablet (5 mg) by mouth daily as directed. Adjust dose per INR results. 90 tablet 1     No current facility-administered medications on file prior to visit.

## 2021-06-12 NOTE — PROGRESS NOTES
[facility]      Code Status:  FULL CODE  Visit Type: H & P     Facility:  [facility] [[id number]]           History of Present Illness: Elvira Bush is a 77 y.o. female who is currently readmitted to the TCU as a transfer from the hospital  This is a 77-year-old who was recently in the TCU when she was admitted after she had seizures of unexplained etiology.  Subsequently this she was discharged home and her Keppra dosage was decreased to 500 twice daily  Presented to the hospital with left-sided pain after she tripped and fell over a curb she was admitted with a left comminuted pubic rami fracture with a left sacral fracture and displaced left L5 transverse process fracture.  In addition she had a possible nondisplaced L3 spinous process fracture along with a pelvic hematoma.  He was seen both by orthopedics as well as trauma surgery and has been advised conservative treatment with no surgical intervention as yet.  She is partially weightbearing with a walker on her left lower extremity.  Pain management is on Tylenol along with tramadol.  She is on chronic anticoagulation for A. Fib his Coumadin which was held in her hospitalization.  Her admission INR was 2.6 and she was given vitamin K and fresh frozen plasma to reverse it her hemoglobin upon discharge was felt to be stable at 8.3 and her Coumadin has been resumed at the time of her discharge.  Her pelvic hematoma has remained stable.  She also underwent a CT cystogram but no evidence of bladder perforation was noted and she will be monitored for her INRs closely  Was also noted to have a UTI with cultures growing Klebsiella and remains on antibiotics.  Other medical problems including a past history of breast cancer as well as chronic A. Fib; retention and hyperlipidemia are stable.  For her seizure disorder she remains on a low-dose of Keppra 500 mg twice a day  Patient remains tearful in affect.  Her daughter was  present at her bedside and was updated about the care plan    Past Medical History:   Diagnosis Date     Atrial fibrillation      Breast cancer 3-2014     GERD (gastroesophageal reflux disease)      HTN (hypertension)      Hx of radiation therapy 5-2014    Left Breast     Hypercholesterolemia      Osteopenia      Skin cancer      Past Surgical History:   Procedure Laterality Date     BREAST LUMPECTOMY Left 3-2014     KNEE ARTHROSCOPY Right 2009     [facility]  7/27/2017          SKIN CANCER EXCISION       TENDON RELEASE       Family History   Problem Relation Age of Onset     Coronary artery disease Mother      No Medical Problems Daughter      Stroke Maternal Grandmother      Prostate cancer Maternal Grandfather      Cerebral aneurysm Maternal Aunt      No Medical Problems Paternal Aunt      Skin cancer Son      BRCA 1/2 Neg Hx      Breast cancer Neg Hx      Cancer Neg Hx      Colon cancer Neg Hx      Endometrial cancer Neg Hx      Ovarian cancer Neg Hx      Social History     Social History     Marital status:      Spouse name: N/A     Number of children: N/A     Years of education: N/A     Occupational History     Not on file.     Social History Main Topics     Smoking status: Never Smoker     Smokeless tobacco: Never Used     Alcohol use 1.2 oz/week     2 Glasses of wine per week      Comment: no alcohol this summer     Drug use: No     Sexual activity: Not on file     Other Topics Concern     Not on file     Social History Narrative     Current Outpatient Prescriptions   Medication Sig Dispense Refill     acetaminophen (TYLENOL) 500 MG tablet Take 2 tablets (1,000 mg total) by mouth 3 (three) times a day. For 10 day then as needed  0     aspirin 81 MG tablet Take 81 mg by mouth daily.       atenolol (TENORMIN) 100 MG tablet Take 100 mg by mouth daily.       atorvastatin (LIPITOR) 20 MG tablet Take 20 mg by mouth daily.       calcium-vitamin D (CALCIUM-VITAMIN D) 500 mg(1,250mg) -200 unit per tablet Take 1  tablet by mouth 2 (two) times a day with meals.        cephalexin (KEFLEX) 500 MG capsule Take 1 capsule (500 mg total) by mouth 2 (two) times a day for 7 days. 14 capsule 0     hydroCHLOROthiazide (HYDRODIURIL) 25 MG tablet Take 25 mg by mouth daily.       levETIRAcetam (KEPPRA) 500 MG tablet Take 1 tablet (500 mg total) by mouth 2 (two) times a day. 60 tablet 0     magnesium oxide (MAG-OX) 400 mg tablet Take 1 tablet (400 mg total) by mouth daily.  0     melatonin 5 mg Tab tablet Take 5 mg by mouth at bedtime as needed.       multivitamin (MULTIVITAMIN) per tablet Take 1 tablet by mouth daily.       neomycin-bacitracin-polymyxin (NEOSPORIN) ointment On laceration for 1 week  0     omeprazole (PRILOSEC) 20 MG capsule Take 20 mg by mouth daily before breakfast.       senna-docusate (PERICOLACE) 8.6-50 mg tablet Take 1 tablet by mouth daily.  0     traMADol (ULTRAM) 50 mg tablet Take 0.5-1 tablets (25-50 mg total) by mouth every 6 (six) hours as needed. 30 tablet 0     warfarin (COUMADIN) 5 MG tablet Take 2.5 mg by mouth See Admin Instructions. Take 2.5 mg on Mon/Wed/Fri.  Take 5 mg on Sat/Sun/Tues/Thurs.       warfarin (COUMADIN) 5 MG tablet Take 5 mg by mouth See Admin Instructions. Take 2.5 mg on Mon/Wed/Fri.  Take 5 mg on Sat/Sun/Tues/Thurs.       No current facility-administered medications for this visit.      No Known Allergies      Review of Systems:    Constitutional: Negative.  Negative for fever, chills, HAS activity change, appetite change and fatigue.   HENT: Negative for congestion and facial swelling.    Eyes: Negative for photophobia, redness and visual disturbance.   Respiratory: Negative for cough and chest tightness.    Cardiovascular: Negative for chest pain, palpitations and leg swelling.   Gastrointestinal: Negative for nausea, diarrhea, constipation, blood in stool and abdominal distention.   Genitourinary: Negative.    Musculoskeletal: Negative.  pain in left leg and pelvis with  movement  Skin: Negative.    Neurological: Negative for dizziness, tremors, syncope, weakness, light-headedness and headaches.   Hematological: Does not bruise/bleed easily.   Psychiatric/Behavioral: Negative.      Vitals:    09/12/17 1623   BP: 144/90   Pulse: 82   Resp: 17   Temp: 98  F (36.7  C)       Physical Exam:    GENERAL: no acute distress. Cooperative in conversation.   HEENT: pupils are equal, round and reactive. Oral mucosa is moist and intact.  RESP:Chest symmetric. Regular respiratory rate. No stridor.  CVS: S1S2  ABD: Nondistended, soft. Tender on left side of pelvis and back of thigh but can move her legs well  EXTREMITIES: No lower extremity edema.   NEURO: non focal. Alert and oriented x3.   PSYCH: within normal limits. No depression or anxiety.  SKIN: warm dry intact       Labs:    Recent Results (from the past 240 hour(s))   INR   Result Value Ref Range    INR 1.80 (H) 0.90 - 1.10   Basic Metabolic Panel   Result Value Ref Range    Sodium 140 136 - 145 mmol/L    Potassium 3.3 (L) 3.5 - 5.0 mmol/L    Chloride 103 98 - 107 mmol/L    CO2 26 22 - 31 mmol/L    Anion Gap, Calculation 11 5 - 18 mmol/L    Glucose 133 (H) 70 - 125 mg/dL    Calcium 9.4 8.5 - 10.5 mg/dL    BUN 24 8 - 28 mg/dL    Creatinine 0.89 0.60 - 1.10 mg/dL    GFR MDRD Af Amer >60 >60 mL/min/1.73m2    GFR MDRD Non Af Amer >60 >60 mL/min/1.73m2   HM2(CBC w/o Differential)   Result Value Ref Range    WBC 11.0 4.0 - 11.0 thou/uL    RBC 3.38 (L) 3.80 - 5.40 mill/uL    Hemoglobin 10.0 (L) 12.0 - 16.0 g/dL    Hematocrit 29.0 (L) 35.0 - 47.0 %    MCV 86 80 - 100 fL    MCH 29.6 27.0 - 34.0 pg    MCHC 34.5 32.0 - 36.0 g/dL    RDW 14.6 (H) 11.0 - 14.5 %    Platelets 151 140 - 440 thou/uL    MPV 10.3 8.5 - 12.5 fL   INR   Result Value Ref Range    INR 2.63 (H) 0.90 - 1.10   Hepatic Profile   Result Value Ref Range    Bilirubin, Total 0.9 0.0 - 1.0 mg/dL    Bilirubin, Direct 0.3 <=0.5 mg/dL    Protein, Total 6.3 6.0 - 8.0 g/dL    Albumin 3.4 (L)  3.5 - 5.0 g/dL    Alkaline Phosphatase 60 45 - 120 U/L    AST 24 0 - 40 U/L    ALT 23 0 - 45 U/L   Type and Screen   Result Value Ref Range    ABORh A POS     Antibody Screen Negative Negative   Urinalysis-UC if Indicated   Result Value Ref Range    Color, UA Yellow Colorless, Yellow, Straw, Light Yellow    Clarity, UA Hazy (!) Clear    Glucose, UA Negative Negative    Bilirubin, UA Negative Negative    Ketones, UA Negative Negative    Specific Gravity, UA 1.015 1.001 - 1.030    Blood, UA Negative Negative    pH, UA 6.0 4.5 - 8.0    Protein, UA 30 mg/dL (!) Negative mg/dL    Urobilinogen, UA <2.0 E.U./dL <2.0 E.U./dL, 2.0 E.U./dL    Nitrite, UA Negative Negative    Leukocytes, UA Large (!) Negative    Bacteria, UA Many (!) None Seen hpf    RBC, UA 0-2 None Seen, 0-2 hpf    WBC, UA >100 (!) None Seen, 0-5 hpf    Squam Epithel, UA 25-50 (!) None Seen, 0-5 lpf    Mucus, UA Moderate (!) None Seen lpf   Culture, Urine   Result Value Ref Range    Culture >100,000 col/ml Klebsiella pneumoniae (!)        Susceptibility    Klebsiella pneumoniae - ALVAREZ     Amoxicillin + Clavulanate <=4/2 Sensitive      Ampicillin 16 Resistant      Cefazolin <=1 Sensitive      Cefepime <=1 Sensitive      Ceftriaxone <=1 Sensitive      Ciprofloxacin <=0.5 Sensitive      Gentamicin <=2 Sensitive      Levofloxacin <=1 Sensitive      Meropenem <=0.25 Sensitive      Nitrofurantoin >64 Resistant      Tetracycline <=2 Sensitive      Tobramycin <=2 Sensitive      Trimethoprim + Sulfamethoxazole <=0.5 Sensitive    ECG 12 lead nursing unit performed   Result Value Ref Range    SYSTOLIC BLOOD PRESSURE 156 mmHg    DIASTOLIC BLOOD PRESSURE 84 mmHg    VENTRICULAR RATE 76 BPM    ATRIAL RATE 326 BPM    P-R INTERVAL  ms    QRS DURATION 76 ms    Q-T INTERVAL 430 ms    QTC CALCULATION (BEZET) 483 ms    P Axis  degrees    R AXIS -29 degrees    T AXIS 259 degrees    MUSE DIAGNOSIS       Atrial flutter with variable A-V block  Low voltage QRS  Possible Inferior  infarct , age undetermined  Cannot rule out Anteroseptal infarct (cited on or before 20-JUN-2017)  Abnormal ECG  When compared with ECG of 26-JUL-2017 15:41,  No significant change was found  Confirmed by NAIMA AGUILAR MD LOC:WW ([id number]) on 9/9/2017 5:34:38 PM     Hemoglobin   Result Value Ref Range    Hemoglobin 8.6 (L) 12.0 - 16.0 g/dL   Protime-INR   Result Value Ref Range    INR 1.41 (H) 0.90 - 1.10   Potassium   Result Value Ref Range    Potassium 3.5 3.5 - 5.0 mmol/L   Plasma Product Information   Result Value Ref Range    Unit Type A Pos     Blood Expiration Date [id number]     Unit Number [id number]     Status Transfused     Component FROZEN PLASMA     PRODUCT CODE L9183Y34     Issue Date and Time [id number]     Blood Type 6200     CODING SYSTEM [id number]    Hemoglobin   Result Value Ref Range    Hemoglobin 8.5 (L) 12.0 - 16.0 g/dL   Basic Metabolic Panel   Result Value Ref Range    Sodium 142 136 - 145 mmol/L    Potassium 4.3 3.5 - 5.0 mmol/L    Chloride 109 (H) 98 - 107 mmol/L    CO2 23 22 - 31 mmol/L    Anion Gap, Calculation 10 5 - 18 mmol/L    Glucose 98 70 - 125 mg/dL    Calcium 8.6 8.5 - 10.5 mg/dL    BUN 19 8 - 28 mg/dL    Creatinine 0.74 0.60 - 1.10 mg/dL    GFR MDRD Af Amer >60 >60 mL/min/1.73m2    GFR MDRD Non Af Amer >60 >60 mL/min/1.73m2   HM1 (CBC with Diff)   Result Value Ref Range    WBC 9.8 4.0 - 11.0 thou/uL    RBC 3.00 (L) 3.80 - 5.40 mill/uL    Hemoglobin 8.8 (L) 12.0 - 16.0 g/dL    Hematocrit 26.0 (L) 35.0 - 47.0 %    MCV 87 80 - 100 fL    MCH 29.3 27.0 - 34.0 pg    MCHC 33.8 32.0 - 36.0 g/dL    RDW 14.8 (H) 11.0 - 14.5 %    Platelets 123 (L) 140 - 440 thou/uL    MPV 10.7 8.5 - 12.5 fL    Neutrophils % 85 (H) 50 - 70 %    Lymphocytes % 8 (L) 20 - 40 %    Monocytes % 5 2 - 10 %    Eosinophils % 2 0 - 6 %    Basophils % 0 0 - 2 %    Neutrophils Absolute 8.3 (H) 2.0 - 7.7 thou/uL    Lymphocytes Absolute 0.8 0.8 - 4.4 thou/uL    Monocytes Absolute 0.5 0.0 - 0.9 thou/uL     Eosinophils Absolute 0.2 0.0 - 0.4 thou/uL    Basophils Absolute 0.0 0.0 - 0.2 thou/uL   Protime-INR   Result Value Ref Range    INR 1.28 (H) 0.90 - 1.10   Plasma Product Information   Result Value Ref Range    Unit Type A Pos     Blood Expiration Date [id number]     Unit Number [id number]     Status Transfused     Component FROZEN PLASMA     PRODUCT CODE E8096XK2     Issue Date and Time [id number]     Blood Type 6200     CODING SYSTEM [id number]    Hemoglobin   Result Value Ref Range    Hemoglobin 7.8 (L) 12.0 - 16.0 g/dL   Basic Metabolic Panel   Result Value Ref Range    Sodium 140 136 - 145 mmol/L    Potassium 3.8 3.5 - 5.0 mmol/L    Chloride 111 (H) 98 - 107 mmol/L    CO2 19 (L) 22 - 31 mmol/L    Anion Gap, Calculation 10 5 - 18 mmol/L    Glucose 96 70 - 125 mg/dL    Calcium 8.2 (L) 8.5 - 10.5 mg/dL    BUN 17 8 - 28 mg/dL    Creatinine 0.67 0.60 - 1.10 mg/dL    GFR MDRD Af Amer >60 >60 mL/min/1.73m2    GFR MDRD Non Af Amer >60 >60 mL/min/1.73m2   INR   Result Value Ref Range    INR 1.22 (H) 0.90 - 1.10   Hemoglobin   Result Value Ref Range    Hemoglobin 8.5 (L) 12.0 - 16.0 g/dL   Hemoglobin   Result Value Ref Range    Hemoglobin 8.7 (L) 12.0 - 16.0 g/dL   INR   Result Value Ref Range    INR 1.25 (H) 0.90 - 1.10   Potassium - Next AM   Result Value Ref Range    Potassium 3.8 3.5 - 5.0 mmol/L   HM1 (CBC with Diff)   Result Value Ref Range    WBC 7.2 4.0 - 11.0 thou/uL    RBC 2.86 (L) 3.80 - 5.40 mill/uL    Hemoglobin 8.3 (L) 12.0 - 16.0 g/dL    Hematocrit 24.8 (L) 35.0 - 47.0 %    MCV 87 80 - 100 fL    MCH 29.0 27.0 - 34.0 pg    MCHC 33.5 32.0 - 36.0 g/dL    RDW 14.9 (H) 11.0 - 14.5 %    Platelets 159 140 - 440 thou/uL    MPV 10.9 8.5 - 12.5 fL    Neutrophils % 76 (H) 50 - 70 %    Lymphocytes % 15 (L) 20 - 40 %    Monocytes % 6 2 - 10 %    Eosinophils % 3 0 - 6 %    Basophils % 0 0 - 2 %    Neutrophils Absolute 5.4 2.0 - 7.7 thou/uL    Lymphocytes Absolute 1.1 0.8 - 4.4 thou/uL    Monocytes Absolute 0.4  0.0 - 0.9 thou/uL    Eosinophils Absolute 0.2 0.0 - 0.4 thou/uL    Basophils Absolute 0.0 0.0 - 0.2 thou/uL     Ct Abdomen Pelvis Without Oral Without Iv Contrast    Result Date: 9/9/2017  CT ABDOMEN PELVIS WO ORAL WO IV CONTRAST 9/9/2017 9:16 AM INDICATION: pelvic trauma, pain TECHNIQUE: Routine CT abdomen and pelvis without oral or IV contrast. Multiplanar reformation images (MPR). Dose reduction techniques were used. COMPARISON: None. FINDINGS: LUNG BASES: Subsegmental basilar atelectasis. Heart size at upper limits normal. Small hiatal hernia. ABDOMEN: Tiny layering gallstones. Normal noncontrast appearance of the liver, spleen, adrenals, and pancreas. No hydronephrosis or perinephric fluid collection. Aortoiliac atherosclerosis without aneurysm. Normal caliber large and small bowel. There is a prominent diverticulum associated with the third portion of the duodenum. No free air. Distal colonic diverticulosis. Small amount of layering blood products in the inferior reaches of the bilateral paracolic gutters, left greater than right. PELVIS: Small amount of layering hemoperitoneum. The urinary bladder is not distended, although there is a moderate amount of blood products in the space of Retzius anterior to the bladder. Colonic diverticulosis. MUSCULOSKELETAL: There is a nondisplaced fracture of the left inferior pubic ramus. There is a comminuted fracture of the parasymphyseal aspect of the left pubic bone extending into the medial superior ramus. There is no pubic diastases. There is no sacroiliac diastases, although there is a nondisplaced fracture through zones 1 and 2 of the left sacral ala, with cephalad extension into the left lateral mass articular facet of S1 (coronal image 56) and caudad fracture lines extending to the left S1 and S2 neuroforamina (seen on axial images 80 and 83, and 88, respectively). There is a minimally displaced fracture of the left L5 transverse process. Suspected nondisplaced  fracture of the L3 spinous process (sagittal image 66) There are advanced degenerative changes of the lower lumbar spine. The hips are intact, no displaced fracture or dislocation.     CONCLUSION: 1.  Comminuted fracture of the left pubic bone as described above. No pubic diastases. There is a moderate hematoma in the anterior prevesicular space of Retzius, with a smaller amount of intraperitoneal blood products in the pelvis and lower abdomen. Recommend correlation with CT cystogram to evaluate for bladder injury. 2.  Left sacral ala zones 1 and 2 fracture extending into the left S1 and S2 neuroforamina. 3.  Minimally displaced fracture of the left L5 transverse process. 4.  Suspected nondisplaced fracture of the L3 spinous process. 5.  Colonic diverticulosis. 6.  Cholelithiasis.    Ct Cystogram    Result Date: 9/9/2017  CT CYSTOGRAM. 09/09/2017. INDICATION: Pelvic trauma. Fractures of the left sacrum and left superior and inferior pubic rami. Possible bladder rupture. COMPARISON STUDY: CT abdomen and pelvis exam from earlier today. TECHNIQUE: 50 mL of Omnipaque 300 injected into the bladder via Montejo catheter. FINDINGS: PELVIS: The bladder is intact. No evidence for rupture or extravasation. Blood within the lower pelvis surrounding the bladder as seen on study from earlier today. Exam otherwise unchanged from earlier. MUSCULOSKELETAL: Fractures involving the left superior and inferior pubic rami and left sacrum as previously described.     No evidence for bladder rupture.      Assessment/Plan:    Status post mechanical fall with resultant left comminuted pubic rami fracture  Left sacral fracture  Displaced left L5 transverse process fracture and possible nondisplaced L3 spinous process fracture-seen by orthopedics and trauma surgery and recommended conservative treatment partial weightbearing using a walker and discharged to the TCU for rehabilitation  Pelvic hematoma-Coumadin has been held initially.  But due to  stable hemoglobins at 8.3 and stable hematoma she is back on Coumadin with target INR is between 2-3 preferably less than 2.5  Anemia secondary to blood loss discharge hemoglobin was 8.3 and will be monitored in the TCU..   complex partial seizures.  Continue with her Keppra 500 twice a day and outpatient follow-up with neurology  Klebsiella UTI currently on antibiotics denies any dysuria .  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 fib continue with her beta-blocker  Currently back on her baseline dose of anticoagulation with Coumadin  Hypertension-Continue with her home dosage of atenolol and hydrochlorothiazide  Hyperlipidemia-on lipitor  History of breast cancer status post radiation treatment currently in remission  PAIN Management discharged on low-dose of tramadol along with scheduled Tylenol monitor response  Debilitation here for PT OT and rehab.  She is very upset at baseline she has had significant lifestyle changes since she had her seizure and is no longer driving and looking at moving to an independent living versus [facility] soon  Her primary  cares physician is also suspecting some baseline cognitive impairment and her so will wait and see how her cognitive testing results are.  Last CPT was 5 and slums was 24/30.    Total time spent was 45 minutes, more than half of it was in face-to-face counseling regarding disease state, treatment, side effects, documentation, review of clinical data and coordination of care  Electronically signed by: BLAKE Lee  This progress note was completed using Dragon software and there may be grammatical errors.

## 2021-06-12 NOTE — TELEPHONE ENCOUNTER
ANTICOAGULATION  MANAGEMENT    Assessment     Today's INR result of 2.6 is Therapeutic (goal INR of 2.0-3.0)        Warfarin taken as previously instructed    No new diet changes affecting INR    No new medication/supplements affecting INR    Continues to tolerate warfarin with no reported s/s of bleeding or thromboembolism     Previous INR was Therapeutic    Plan:     Spoke on phone with Elvira regarding INR result and instructed:     Warfarin Dosing Instructions:  Continue current warfarin dose 5 mg daily (0 % change)    Instructed patient to follow up no later than: 4 weeks     Education provided: importance of therapeutic range, target INR goal and significance of current INR result, importance of following up for INR monitoring at instructed interval, importance of taking warfarin as instructed, importance of notifying clinic for changes in medications, when to seek medical attention/emergency care and importance of notifying clinic for diarrhea, nausea/vomiting, reduced intake and/or illness    Kym verbalizes understanding and agrees to warfarin dosing plan.    Instructed to call the [facility] for any changes, questions or concerns. (#[phone])   ?   Elma Pettit RN    Subjective/Objective:      Elvira Bush, a 80 y.o. female is on warfarin.     Elvira reports:     Home warfarin dose: verbally confirmed home dose with Pat and updated on anticoagulation calendar     Missed doses: No     Medication changes:  No     S/S of bleeding or thromboembolism:  No     New Injury or illness:  No     Changes in diet or alcohol consumption:  No     Upcoming surgery, procedure or cardioversion:  No    Anticoagulation Episode Summary     Current INR goal:  2.0-3.0   TTR:  48.4 % (1 y)   Next INR check:  11/12/2020   INR from last check:  2.60 (10/15/2020)   Weekly max warfarin dose:     Target end date:  Indefinite   INR check location:     Preferred lab:     Send INR reminders to:  [facility]        Comments:           Anticoagulation Care Providers     Provider Role Specialty Phone number    Loli Alberts MD Referring Family Medicine [phone]

## 2021-06-12 NOTE — PROGRESS NOTES
[facility]    DATE: 8/10/2017    NAME:  Elvira Bush             :  1940  MRN: [id number]  CODE STATUS:  FULL CODE    VISIT TYPE: DISCHARGE SUMMARY  FACILYTY: [facility] [[id number]]                    PRIMARY CARE PROVIDER: Manjinder Haley MD    DISCHARGE DIAGNOSIS:      1. Essential hypertension    2. Convulsions, unspecified convulsion type    3. Chronic atrial fibrillation         DISCHARGE MEDICATIONS:         Medication List          These changes are accurate as of: 8/10/17  2:41 PM.  If you have any questions, ask your nurse or doctor.               CONTINUE taking these medications          aspirin 81 MG EC tablet       atenolol 100 MG tablet   Commonly known as:  TENORMIN       atorvastatin 20 MG tablet   Commonly known as:  LIPITOR       calcium-vitamin D 500 mg(1,250mg) -200 unit per tablet   Generic drug:  calcium-vitamin D       hydroCHLOROthiazide 25 MG tablet   Commonly known as:  HYDRODIURIL       levETIRAcetam 750 MG tablet   Commonly known as:  KEPPRA   Take 1 tablet (750 mg total) by mouth 2 (two) times a day.       magnesium oxide 400 mg tablet   Commonly known as:  MAG-OX   Take 1 tablet (400 mg total) by mouth daily.       multivitamin per tablet   Generic drug:  multivitamin       omeprazole 20 MG capsule   Commonly known as:  PriLOSEC       QUEtiapine 25 MG tablet   Commonly known as:  SEROquel   Take 0.5 tablets (12.5 mg total) by mouth at bedtime.       warfarin 5 MG tablet   Commonly known as:  COUMADIN   Take 5mg by mouth daily 17 and 8/3/17. Adjust dose based on INR results 17. Goal INR 2-2.5 FOR ATRIAL FIBRILLATION             HISTORY OF PRESENT ILLNESS: Elvira Bush is a 77 y.o. female  with underlying history of A. fib , HTN, as well as a history of breast cancer in  .  She presented to the emergency room when she was driving by a friend with acute onset of slurring of speech and left-sided weakness including  left hand and left foot with confusion while in the emergency room.  Patient became unresponsive and started having seizures.   she underwent an emergent CTA to rule out CVA.  She was intubated and subsequently extubated on 7/29/17.  Neurology saw this patient for complex partial seizures with negative MRI for any acute ischemia.  EEG did not show any specific finding other than nonspecific slowing and based on the recommendation she is currently on Keppra with advice to do an outpatient follow-up in 6-8 weeks  Her confusion seems to be resolving and she is becoming alert every day as per her.  Her left-sided weakness of the hand and foot seems to be resolving and she is ambulating using a cane.  In addition postictal course complicated by acute hypoxic respiratory failure requiring intubation as well as stridor for which she was given steroids likely related to mild upper airway edema as per ENT.  Both these issues have completely resolved  Was also noted to have a UTI and she has finished her treatment.  She has had no respiratory distress throughout her course nor has she had any seizure activity.  She continues on Coumadin 5 mg p.o. daily for her A. fib, next INR is due 8/15/2017.. She has been using Seroquel as needed for sleep at night, and she will be discharged with a minimal amount, and is to follow up with her primary for further insomnia needs.  I  SKILLED NURSING FACILITY COURSE:  During this TCU stay, patient completed all anticipated goals of therapy.      PHYSICAL EXAMINATION:    Vitals:    08/10/17 1437   BP: 132/87   Pulse: 86   Temp: 97.4  F (36.3  C)   Weight: 156 lb (70.8 kg)         GENERAL: Awake, Alert, oriented x3, not in any form of acute distress, answers questions appropriately, follows simple commands, conversant  HEENT: Head is normocephalic with normal hair distribution. No evidence of trauma. Ears: No acute purulent discharge. Eyes: Conjunctivae pink with no scleral jaundice. Nose:  Normal mucosa and septum. NECK: Supple with no cervical or supraclavicular lymphadenopathy. Trachea is midline.   CHEST: No tenderness or deformity, no crepitus  LUNG: Clear to auscultation with good chest expansion. There are no crackles or wheezes, normal AP diameter.  BACK: No kyphosis of the thoracic spine. Symmetric, no curvature, ROM normal, no CVA tenderness, no spinal tenderness   CVS: There is good S1  S2, there are no murmurs, rubs, gallops, or heaves, rhythm is regular,  2+ pulses symmetric in all extremities.  ABDOMEN: Globular and soft, nontender to palpation, non distended, no masses, no organomegaly, good bowel sounds, no rebound or guarding, no peritoneal signs.   EXTREMITIES: Atraumatic. Full range of motion on both upper and lower extremities, there is no tenderness to palpation, no pedal edema, no cyanosis or clubbing, no calf tenderness.  Pulses equal in all extremities, normal cap refill, no joint swelling.  Does use a cane for ambulation and to assist with proper balance.  SKIN: Warm and dry, no erythema noted.  Skin color, texture, no rashes or lesions.  NEUROLOGICAL: The patient is oriented to person, place and time. Strength and sensation are grossly intact. Face is symmetric.      LABS:  All labs reviewed in the nursing home record.        DISCHARGE PLAN: I certify that this patient is under Dr. Moses's care, seen by the NP, and had a face-to-face encounter that meets the physician face-to-face encounter requirements on 8/10/17.  The encounter was in whole, or part related to the primary reason for home health.  She is dependent on others for transportation.   The patient has been under the care of /NP and Dr. Salinas initiated the establishment of the plan of care.      Patient decline home care or is not home bound following discharge.  It is recommeneded for patient to continue PT/OT on an outpatient basis through [facility]    Patient will follow up with PCP within 7-10 days  after discharge for medication mangagment and appropriate lab studies. INR is due on 8/15/17    I, Nery Heller am scribing for and in the presence of, Dr. Elaine Moses.   I Dr. Elaine Moses, personally performed the services described in this documentation, as scribed by  Nery Heller in my presence, and it is both accurate and complete.   Total time spent was 35 minutes, more than half of it was in face-to-face counseling regarding disease state, treatment, side effects, documentation, review of clinical data and coordination of care  Electronically Signed by:  Elaine Moses      For documentation purposes, chart review, medication management, and discharge coordination of care was greater than 35 minutes

## 2021-06-13 NOTE — PROGRESS NOTES
"[facility] Hematology and Oncology Progress Note    Patient: Elvira Bush  MRN: [id number]  Date of Service:         Reason for Visit:    Follow up left breast cancer    Assessment and Plan:    1 Breast cancer of lower-outer quadrant of left female breast     T1c, NX, cM0   Triple negative    77 y.o.     2014, March - abnormal mammogram    03/24/14 - lumpectomy:    1.1 cm invasive ductal carcinoma,    Grade 3,     ER negative, NJ barely positive, HER-2/lolita negative    Negative margins.      No lymph node in the biopsy sample.       Lymphovascular invasion.      Patient opted against chemotherapy.      07/14/16 - completed 6040 cGy breast conservation EBRT.    Every 6 month follow up since.    11/02/17:    Bilateral screening mammogram - benign findings.    CBC - WNL    CMP - GFR improved @ 58%.  Mild hypokalemia - encouraged daily banana.  Alk phos slightly elevated @ 133.  Otherwise WNL.     Takes Tylenol, generally 2-500 mg tabs twice daily.  This is the likely reason for her mild elevation of Alk Phos.  Encouraged to limit Tylenol to an only as needed basis.    The patient looks and feels well and is in good spirits.  Clinically and radiographically FCO.    Bone density later today.    05/02/18 - 6 month follow up - Dr Montoya with labs.    Yearly bilateral mammogram in November, 2018.      ECOG Performance:     ECOG Performance Status: 0    Distress Assessment:    Distress Assessment Score: 4 (\"I'm a worrier\")        TT > 25 minutes face to face with > 50% counseling and care coordination.    Nabor Masters, CNP     CC: Loli Alberts MD (Betsy)  ______________________________________________________________________________    Interim History:    The patient presents to her routine scheduled follow up accompanied by her daughter.  She was discharged from [facility] a couple weeks ago and has returned to her home living independently and doing well.  She is generally feeling well.  She cough, fever, chills, " "shortness of breath, unusual headache, visual or mentation disturbance, bowel or bladder issues.  Her appetite has returned since returning home, but she has lost about 25 pounds since spring.  She states she \"hated\" the TCU food.    Since I last saw her:    She was hospitalized 07/26 - 08/02/17 with symptoms concerning for a stroke with slurred speech, left hand and left foot weakness.  CTA showed encephalomalacia involving the right temporal lobe likely due to chronic infarct.  There was also likely a subacute infarct in the left basal ganglia. CTA did not show any high grade stenosis or aneurysm.  A partially calcified likely meningioma along right aspect of anterio rfalx was also noted.  MRI was also negative for any acute ischemia.  In the ED she became unresponsive and started seizing which lasted for about 1 minute.  She was loaded with keppra and then started on maintenance dose.  She became unresponsive following the seizure and was intubated and admitted to the ICU.  She was extubated on 7/29/2017.  The patient was found to have a UTI and her culture grew klebsiella, treated with IV rocephin, switched to oral cefdinir at discharge.   She was diagnosed with atrial fibrillation on 6/20 during a pre op physical.  She was asymptomatic.  ECHO showed her LVEF @ 55%.  Once she was stabilized she was started on coumadin with an INR goal of 2-2.5.  CHADS2 vasc score is at least 2 given age and HTN and with the old stroke seen on MRI is likely higher.     She was hospitalized 09/09 - 09/12/17 with a left sacral and pubic rami fracture, displaced L5 transverse process fracture and mechanical fall after tripping off a curb.  Orthopedics declared that she was not a surgical candidate.  She was discharged to [facility] for physical therapy and subsequently discharged from there on October 4, 2017.  During her TCU stay her strength and stability improved to the point that she was able to ambulate with a walker.  She " "has continued with outpatient therapy twice weekly and has recently transitioned to a cane.  Generally, Tylenol, 2-500 mg tabs twice daily manages any discomfort adequately.    Pain Status:    Currently in Pain: Yes  (2/10) Pelvis - managed with Tylenol p.r.n.    Review of Systems:    Constitutional  Constitutional (WDL): Exceptions to WDL  Fatigue: Fatigue relieved by rest  Weight Loss: to <10% from baseline, intervention not indicated (12 lbs since August)  Neurosensory  Neurosensory (WDL): All neurosensory elements are within defined limits  Cardiovascular  Cardiovascular (WDL): All cardiovascular elements are within defined limits  Pulmonary  Respiratory (WDL): Within Defined Limits  Gastrointestinal  Gastrointestinal (WDL): Exceptions to WDL  Anorexia: Loss of appetite without alteration in eating habits  Dysgeusia: Altered taste but no change in diet  Genitourinary  Genitourinary (WDL): All genitourinary elements are within defined limits  Integumentary  Integumentary (WDL): All integumentary elements are within defined limits  Patient Coping  Patient Coping: Accepting  Distress Assessment  Distress Assessment Score: 4 (\"I'm a worrier\")  Accompanied by  Accompanied by: Family Member    Past Histories:    Past Medical History:   Diagnosis Date     Atrial fibrillation      Breast cancer 3-2014     GERD (gastroesophageal reflux disease)      HTN (hypertension)      Hx of radiation therapy 5-2014    Left Breast     Hypercholesterolemia      Osteopenia      Skin cancer          Past Surgical History:   Procedure Laterality Date     BREAST LUMPECTOMY Left 3-2014     KNEE ARTHROSCOPY Right 2009     [facility]  7/27/2017          SKIN CANCER EXCISION       TENDON RELEASE         Physical Exam:    Recent Vitals 11/2/2017   Height 5' 5.5\"   Weight 139 lbs 14 oz   BSA (m2) 1.71 m2   /80   Pulse 86   Temp -   SpO2 99   Some recent data might be hidden     General: Alert and oriented.  No distress.  Accompanied by " daughter.  HEENT: Anicteric sclera.  EOMI.  LIZZY.  No mucosal lesions.     Chest:  Lungs clear.    Cardiac: S1, S2 heard.  Regular rate and rhythm.  She has soft systolic murmur.    Abdomen: Soft, nontender or distended, no palpable hepatosplenomegaly, masses or ascites.    Extremities:   No edema, no cyanosis, no clubbing.    Lymphatics:  No palpable lymphadenopathy in the neck, supraclavicular fossa or the armpit.        Breasts:  Scar in the upper outer quadrant of left breast.  No palpable lesions, nipple discharge, skin changes in either breast or axilla.    Patient declined offer of female  during exam.    Lab Results:    Reviewed with patient.    Recent Results (from the past 24 hour(s))   Comprehensive Metabolic Panel   Result Value Ref Range    Sodium 145 136 - 145 mmol/L    Potassium 3.4 (L) 3.5 - 5.0 mmol/L    Chloride 106 98 - 107 mmol/L    CO2 25 22 - 31 mmol/L    Anion Gap, Calculation 14 5 - 18 mmol/L    Glucose 109 70 - 125 mg/dL    BUN 16 8 - 28 mg/dL    Creatinine 0.93 0.60 - 1.10 mg/dL    GFR MDRD Af Amer >60 >60 mL/min/1.73m2    GFR MDRD Non Af Amer 58 (L) >60 mL/min/1.73m2    Bilirubin, Total 0.5 0.0 - 1.0 mg/dL    Calcium 10.1 8.5 - 10.5 mg/dL    Protein, Total 7.7 6.0 - 8.0 g/dL    Albumin 3.7 3.5 - 5.0 g/dL    Alkaline Phosphatase 133 (H) 45 - 120 U/L    AST 28 0 - 40 U/L    ALT 25 0 - 45 U/L   HM1 (CBC with Diff)   Result Value Ref Range    WBC 8.4 4.0 - 11.0 thou/uL    RBC 4.24 3.80 - 5.40 mill/uL    Hemoglobin 12.4 12.0 - 16.0 g/dL    Hematocrit 37.1 35.0 - 47.0 %    MCV 88 80 - 100 fL    MCH 29.2 27.0 - 34.0 pg    MCHC 33.4 32.0 - 36.0 g/dL    RDW 14.7 (H) 11.0 - 14.5 %    Platelets 241 140 - 440 thou/uL    MPV 9.7 8.5 - 12.5 fL    Neutrophils % 77 (H) 50 - 70 %    Lymphocytes % 16 (L) 20 - 40 %    Monocytes % 5 2 - 10 %    Eosinophils % 1 0 - 6 %    Basophils % 0 0 - 2 %    Neutrophils Absolute 6.4 2.0 - 7.7 thou/uL    Lymphocytes Absolute 1.4 0.8 - 4.4 thou/uL    Monocytes  Absolute 0.4 0.0 - 0.9 thou/uL    Eosinophils Absolute 0.1 0.0 - 0.4 thou/uL    Basophils Absolute 0.0 0.0 - 0.2 thou/uL       Imaging:    Reviewed with patient.    Mammo Screening Bilateral    Result Date: 11/2/2017  BILATERAL FULL FIELD DIGITAL SCREENING MAMMOGRAM Performed on: 11/2/17 Compared to: 05/23/2016 Mammo Diagnostic Right, and 05/19/2016 Mammo Screening Bilateral Findings: The breasts have scattered areas of fibroglandular densities.  There are postsurgical lumpectomy changes in the left breast. No evidence for malignancy and no change from the previous exam(s). This study was evaluated with the assistance of Computer-Aided Detection. Continue routine screening mammogram as recommended. ACR BI-RADS Category 2: Benign Findings

## 2021-06-13 NOTE — TELEPHONE ENCOUNTER
ANTICOAGULATION  MANAGEMENT    Assessment     Today's INR result of 3.0 is Therapeutic (goal INR of 2.0-3.0)        Warfarin taken as previously instructed    No new diet changes affecting INR    No new medication/supplements affecting INR    Continues to tolerate warfarin with no reported s/s of bleeding or thromboembolism     Previous INR was Therapeutic    Plan:     Spoke on phone with Elvira regarding INR result and instructed:     Warfarin Dosing Instructions:  Continue current warfarin dose 5 mg daily.    Instructed patient to follow up no later than: 4 weeks.    Education provided: importance of following up for INR monitoring at instructed interval and importance of taking warfarin as instructed    Pat verbalizes understanding and agrees to warfarin dosing plan.    Instructed to call the [facility] for any changes, questions or concerns. (#[phone])   ?   Ravi Liriano RN    Subjective/Objective:      Elvira Bush, a 80 y.o. female is on warfarin.     Elvira reports:     Home warfarin dose: as updated on anticoagulation calendar per template     Missed doses: No     Medication changes:  No     S/S of bleeding or thromboembolism:  No     New Injury or illness:  No     Changes in diet or alcohol consumption:  No     Upcoming surgery, procedure or cardioversion:  No    Anticoagulation Episode Summary     Current INR goal:  2.0-3.0   TTR:  54.7 % (1 y)   Next INR check:  12/10/2020   INR from last check:  3.00 (11/12/2020)   Weekly max warfarin dose:     Target end date:  Indefinite   INR check location:     Preferred lab:     Send INR reminders to:  LAINE ENRIQUEZ       Comments:           Anticoagulation Care Providers     Provider Role Specialty Phone number    Loli Alberts MD Referring Family Medicine [phone]

## 2021-06-13 NOTE — PROGRESS NOTES
[facility]      NAME:  Elvira Bush             :  1940  MRN: [id number]  CODE STATUS:  FULL CODE    VISIT TYPE: DISCHARGE SUMMARY  FACILYTY:  [facility] [[id number]]                    PRIMARY CARE PROVIDER: Loli Alberts MD (Betsy)  HOSPITALZATION:   [facility] -   DISCHARGE DIAGNOSIS:      1. Closed displaced fracture of left pubis, initial encounter    2. Closed fracture of spinous process of lumbar vertebra    3. Essential Hypercholesterolemia    4. Chronic atrial fibrillation         DISCHARGE MEDICATIONS:         Medication List          These changes are accurate as of: 10/2/17  5:26 PM.  If you have any questions, ask your nurse or doctor.               CHANGE how you take these medications          warfarin 3 MG tablet   Commonly known as:  COUMADIN   What changed:  Another medication with the same name was removed. Continue taking this medication, and follow the directions you see here.         CONTINUE taking these medications          acetaminophen 500 MG tablet   Commonly known as:  TYLENOL   Take 2 tablets (1,000 mg total) by mouth 3 (three) times a day. For 10 day then as needed       aspirin 81 MG EC tablet       atenolol 100 MG tablet   Commonly known as:  TENORMIN       atorvastatin 20 MG tablet   Commonly known as:  LIPITOR       calcium-vitamin D 500 mg(1,250mg) -200 unit per tablet   Generic drug:  calcium-vitamin D       hydroCHLOROthiazide 25 MG tablet   Commonly known as:  HYDRODIURIL       levETIRAcetam 500 MG tablet   Commonly known as:  KEPPRA   Take 1 tablet (500 mg total) by mouth 2 (two) times a day.       magnesium oxide 400 mg tablet   Commonly known as:  MAG-OX   Take 1 tablet (400 mg total) by mouth daily.       melatonin 5 mg Tab tablet       multivitamin per tablet   Generic drug:  multivitamin       omeprazole 20 MG capsule   Commonly known as:  PriLOSEC       senna-docusate 8.6-50 mg tablet   Commonly known as:   PERICOLACE   Take 1 tablet by mouth daily.       traMADol 50 mg tablet   Commonly known as:  ULTRAM   Take 0.5-1 tablets (25-50 mg total) by mouth every 6 (six) hours as needed.             HISTORY OF PRESENT ILLNESS: Elvira Bush is a well groomed  77 y.o. female Ms.Patricia BRAD Bush is a 77 y.o. old female who had  a mechanical fall at home.  She tripped and fell on left elbow and buttock..  No loss of consciousness or head injury.  Abdominal CT scan showed comminuted fracture of left pubic bone, left sacral fracture, minimally displaced left L5 transverse process fracture, suspected nondisplaced fracture of L3 spinous process, moderate pelvic hematoma.  She is on chronic anticoagulation treatment for atrial fibrillation.  INR was 2.6. While in acute care  She  received vitamin K 5 mg IV and fresh frozen plasma.  Hemoglobin remained stable at 8.3.  Orthopedic surgery, trauma surgery are consulted.  She was treated medically.  Did not need any surgical intervention.  She was partial weight bearing with walker on left lower extremity.  Pain was much controlled with tramadol 5-50 mg every 6 hours as needed and Tylenol 1000 mg 3 times a day.  She was then discharging to TCU for more rehabilitation. She is to follow up with orthopedics in 4 weeks after d/c and is now FWB.. With h/o seizures, none since admit, on Keppra. Current anticoagulation is stable and on 3 mg coumadin a day with a repeat INR in one week. Her B/P is stable at 118/71 and currently only takes Tylenol for pain    SKILLED NURSING FACILITY COURSE:  During this TCU stay, patient completed all anticipated goals of therapy.      PHYSICAL EXAMINATION:    Vitals:    10/02/17 1656   BP: 118/71   Pulse: 65   Temp: 99.2  F (37.3  C)   Weight: 142 lb 6.4 oz (64.6 kg)         GENERAL: Awake, Alert, oriented x3, not in any form of acute distress, answers questions appropriately, follows simple commands, conversant  HEENT: Head is normocephalic with normal  hair distribution. No evidence of trauma. Ears: No acute purulent discharge. Eyes: Conjunctivae pink with no scleral jaundice. Nose: Normal mucosa and septum. NECK: Supple with no cervical or supraclavicular lymphadenopathy. Trachea is midline.   CHEST: No tenderness or deformity, no crepitus  LUNG: Clear to auscultation with good chest expansion. There are no crackles or wheezes, normal AP diameter.  BACK: No kyphosis of the thoracic spine. Symmetric, no curvature, ROM normal, no CVA tenderness, no spinal tenderness   CVS: There is good S1  S2, there are no murmurs, rubs, gallops, or heaves, rhythm is regular.  ABDOMEN: Globular and soft, nontender to palpation, non distended, no masses, no organomegaly, good bowel sounds, no rebound or guarding, no peritoneal signs.   EXTREMITIES: Atraumatic. Full range of motion on both upper and lower extremities, there is no tenderness to palpation, no pedal edema, no cyanosis or clubbing, no calf tenderness, normal cap refill, no joint swelling.Ambulates with use of a walker and FWB.   SKIN: Warm and dry, no erythema noted, no rashes or lesions.  NEUROLOGICAL: The patient is oriented to person, place and time. Strength and sensation are grossly intact. Face is symmetric.      LABS:  All labs reviewed in the nursing home record.        DISCHARGE PLAN:  Patient decline home care or is not home bound following discharge.  It is recommeneded for patient to continue PT/OT on an outpatient basis.    Patient will follow up with PCP within 5- days after discharge for medication mangagment and appropriate lab studies.          Electronically signed by:  Nery Heller CNP  This progress note was completed using Dragon software and there may be grammatical errors.      For documentation purposes, chart review, medication management, and discharge coordination of care was greater than 35 minutes

## 2021-06-13 NOTE — PROGRESS NOTES
[facility] Patient Outreach:     Discharge Date::  10/3/17      Reason for TCU stay (discharge diagnosis)::  Fall with left pubic bone fx, L5 transverse process fx, A-fib      Are you feeling better, the same or worse since your discharge?:  Patient is feeling better          As part of your discharge plan, did they discuss home care with you?: No (Will be doing outpatient PT with [facility])            Did you receive any new medications, or was there a change to your medications?: No            Do you have any follow up visits scheduled with your PCP or Specialist?:  Yes, with PCP      (RN) Is it scheduled soon enough (3-5 days)?: No        (RN) Is the patient okay with moving appointment up (if RN feels appropriate)?: No (Patient feels that appt with PCP on 10/16 will be soon enough.  )

## 2021-06-13 NOTE — PROGRESS NOTES
[facility]      Code Status:  FULL CODE  Visit Type: Review Of Multiple Medical Conditions     Facility:  [facility] [[id number]]           History of Present Illness: Elvira Bush is a 77 y.o. female who is currently readmitted to the TCU as a transfer from the hospital  This is a 77-year-old who was recently in the TCU when she was admitted after she had seizures of unexplained etiology.  Subsequentl Keppra dosage was decreased to 500 twice daily  Presented to the hospital with left-sided pain after she tripped and fell over a curb she was admitted with a left comminuted pubic rami fracture with a left sacral fracture and displaced left L5 transverse process fracture.  In addition she had a possible nondisplaced L3 spinous process fracture along with a pelvic hematoma.  sHe was seen both by orthopedics as well as trauma surgery and has been advised conservative treatment with no surgical intervention as yet.  She is partially weightbearing with a walker on her left lower extremity.  Pain management is on Tylenol along with tramadol.  She is on chronic anticoagulation for A. Fib his Coumadin which was held in her hospitalization.  Her admission INR was 2.6 and she was given vitamin K and fresh frozen plasma to reverse it her hemoglobin upon discharge was felt to be stable at 8.3 and her Coumadin has been resumed at the time of her discharge.  Her pelvic hematoma has remained stable.  She also underwent a CT cystogram but no evidence of bladder perforation was noted and she will be monitored for her INRs closely  Was also noted to have a UTI with cultures growing Klebsiella and remains on antibiotics.  She remains in significant amount of pain and has been complaining that her pain is not controlled but she does respond well to tramadol.  She is hoping to rebound quickly and is somewhat frustrated  Not using any off her as needed medications for pain and therapy is trying  alternative modalities also for her.  She is responding well to e-stim.  She is walking well    Past Medical History:   Diagnosis Date     Atrial fibrillation      Breast cancer 3-2014     GERD (gastroesophageal reflux disease)      HTN (hypertension)      Hx of radiation therapy 5-2014    Left Breast     Hypercholesterolemia      Osteopenia      Skin cancer      Past Surgical History:   Procedure Laterality Date     BREAST LUMPECTOMY Left 3-2014     KNEE ARTHROSCOPY Right 2009     [facility]  7/27/2017          SKIN CANCER EXCISION       TENDON RELEASE       Family History   Problem Relation Age of Onset     Coronary artery disease Mother      No Medical Problems Daughter      Stroke Maternal Grandmother      Prostate cancer Maternal Grandfather      Cerebral aneurysm Maternal Aunt      No Medical Problems Paternal Aunt      Skin cancer Son      BRCA 1/2 Neg Hx      Breast cancer Neg Hx      Cancer Neg Hx      Colon cancer Neg Hx      Endometrial cancer Neg Hx      Ovarian cancer Neg Hx      Social History     Social History     Marital status:      Spouse name: N/A     Number of children: N/A     Years of education: N/A     Occupational History     Not on file.     Social History Main Topics     Smoking status: Never Smoker     Smokeless tobacco: Never Used     Alcohol use 1.2 oz/week     2 Glasses of wine per week      Comment: no alcohol this summer     Drug use: No     Sexual activity: Not on file     Other Topics Concern     Not on file     Social History Narrative     Current Outpatient Prescriptions   Medication Sig Dispense Refill     acetaminophen (TYLENOL) 500 MG tablet Take 2 tablets (1,000 mg total) by mouth 3 (three) times a day. For 10 day then as needed  0     aspirin 81 MG tablet Take 81 mg by mouth daily.       atenolol (TENORMIN) 100 MG tablet Take 100 mg by mouth daily.       atorvastatin (LIPITOR) 20 MG tablet Take 20 mg by mouth daily.       calcium-vitamin D (CALCIUM-VITAMIN D) 500  mg(1,250mg) -200 unit per tablet Take 1 tablet by mouth 2 (two) times a day with meals.        hydroCHLOROthiazide (HYDRODIURIL) 25 MG tablet Take 25 mg by mouth daily.       levETIRAcetam (KEPPRA) 500 MG tablet Take 1 tablet (500 mg total) by mouth 2 (two) times a day. 60 tablet 0     magnesium oxide (MAG-OX) 400 mg tablet Take 1 tablet (400 mg total) by mouth daily.  0     melatonin 5 mg Tab tablet Take 5 mg by mouth at bedtime as needed.       multivitamin (MULTIVITAMIN) per tablet Take 1 tablet by mouth daily.       neomycin-bacitracin-polymyxin (NEOSPORIN) ointment On laceration for 1 week  0     omeprazole (PRILOSEC) 20 MG capsule Take 20 mg by mouth daily before breakfast.       senna-docusate (PERICOLACE) 8.6-50 mg tablet Take 1 tablet by mouth daily.  0     traMADol (ULTRAM) 50 mg tablet Take 0.5-1 tablets (25-50 mg total) by mouth every 6 (six) hours as needed. 30 tablet 0     warfarin (COUMADIN) 5 MG tablet Take 2.5 mg by mouth See Admin Instructions. Take 2.5 mg on Mon/Wed/Fri.  Take 5 mg on Sat/Sun/Tues/Thurs.       warfarin (COUMADIN) 5 MG tablet Take 5 mg by mouth See Admin Instructions. Take 2.5 mg on Mon/Wed/Fri.  Take 5 mg on Sat/Sun/Tues/Thurs.       No current facility-administered medications for this visit.      No Known Allergies      Review of Systems:    Constitutional: Negative.  Negative for fever, chills, HAS activity change, appetite change and fatigue.   HENT: Negative for congestion and facial swelling.    Eyes: Negative for photophobia, redness and visual disturbance.   Respiratory: Negative for cough and chest tightness.    Cardiovascular: Negative for chest pain, palpitations and leg swelling.   Gastrointestinal: Negative for nausea, diarrhea, constipation, blood in stool and abdominal distention.   Genitourinary: Negative.    Musculoskeletal: Negative.  pain in left leg and pelvis with movement  Skin: Negative.    Neurological: Negative for dizziness, tremors, syncope, weakness,  light-headedness and headaches.   Hematological: Does not bruise/bleed easily.   Psychiatric/Behavioral: Negative.      Vitals:    09/21/17 1116   BP: 104/70   Pulse: 90   Temp: 97  F (36.1  C)       Physical Exam:    GENERAL: no acute distress. Cooperative in conversation.   HEENT: pupils are equal, round and reactive. Oral mucosa is moist and intact.  RESP:Chest symmetric. Regular respiratory rate. No stridor.  CVS: S1S2  ABD: Nondistended, soft. Tender on left side of pelvis and back of thigh but can move her legs well  EXTREMITIES: No lower extremity edema.   NEURO: non focal. Alert and oriented x3.   PSYCH: within normal limits. No depression or anxiety.  SKIN: warm dry intact       Labs:    No results found for this or any previous visit (from the past 48 hour(s)).    Lab Results   Component Value Date    WBC 9.9 09/19/2017    HGB 9.7 (L) 09/19/2017    HCT 29.8 (L) 09/19/2017    MCV 92 09/19/2017     09/19/2017   INR 2.5    Ct Abdomen Pelvis Without Oral Without Iv Contrast  Result Date: 9/9/2017  . CONCLUSION: 1.  Comminuted fracture of the left pubic bone as described above. No pubic diastases. There is a moderate hematoma in the anterior prevesicular space of Retzius, with a smaller amount of intraperitoneal blood products in the pelvis and lower abdomen. Recommend correlation with CT cystogram to evaluate for bladder injury. 2.  Left sacral ala zones 1 and 2 fracture extending into the left S1 and S2 neuroforamina. 3.  Minimally displaced fracture of the left L5 transverse process. 4.  Suspected nondisplaced fracture of the L3 spinous process. 5.  Colonic diverticulosis. 6.  Cholelithiasis.  .  Assessment/Plan:    Status post mechanical fall with resultant left comminuted pubic rami fracture  Left sacral fracture  Displaced left L5 transverse process fracture and possible nondisplaced L3 spinous process fracture-seen by orthopedics and trauma surgery and recommended conservative treatment partial  weightbearing using a walker and discharged to the TCU for rehabilitation.  Slow progress per pt  Pelvic hematoma-Coumadin has been held initially.  But due to stable hemoglobins at 8.3 and stable hematoma she is back on Coumadin with target INR is between 2-3 preferably less than 2.5  Monitor INRs  Anemia secondary to blood loss discharge hemoglobin was 8.3 and will be monitored in the TCU..  R/c improved at 9.7  Recheck ordered for next week   complex partial seizures.  Continue with her Keppra 500 twice a day and outpatient follow-up with neurology  Klebsiella UTI currently on antibiotics denies any dysuria .  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 fib continue with her beta-blocker  Currently back on her baseline dose of anticoagulation with Coumadin  Hypertension-Continue with her home dosage of atenolol and hydrochlorothiazide  Hyperlipidemia-on lipitor  History of breast cancer status post radiation treatment currently in remission  PAIN Management discharged on low-dose of tramadol along with scheduled Tylenol monitor response  At her request schedule tramadol 25mg  PO QID   Alternative modalities including diathermy also being utilized by staff she was advised some icing.  Debilitation here for PT OT and rehab.  She is very upset at baseline she has had significant lifestyle changes since she had her seizure and is no longer driving and looking at moving to an independent living versus [facility] soon  Her primary  cares physician is also suspecting some baseline cognitive impairment and her so will wait and see how her cognitive testing results are.  Last CPT was 5 and slums was 24/30.  Continue with her care plan monitor response to alternative modalities and scheduling pain pills she was advised to monitor for constipation confusion and sedation.  At her request no change made in her pain  regimen as yet.  She is continues to have significant pain and was advised to use as needed narcotics which are available to her too  Monitor response and r/c hg ordered    Electronically signed by: BLAKE Lee  This progress note was completed using Dragon software and there may be grammatical errors.

## 2021-06-13 NOTE — TELEPHONE ENCOUNTER
"Medication Question or Clarification  Who is calling: The patient   What medication are you calling about (include dose and sig)?: Lisinopril 20MG once a day  Who prescribed the medication?: Loli Alberts MD   What is your question/concern?: The patient states she was started on the Lisinopril 11/16/20.The patient is requesting a call if she is to continue on the medication? The patient states she wasn't told to continue on the medication or not. The patient states her blood pressures are \" good.\" The patient states her blood pressure was 118/84 last Monday.  Requested Pharmacy: [facility]  Okay to leave a detailed message?: Yes        "

## 2021-06-13 NOTE — TELEPHONE ENCOUNTER
FYI - Status Update  Who is Calling: Patient  Update: Would like to speak to Agueda in regards to something that she forgot to mention   Okay to leave a detailed message?:  Yes

## 2021-06-13 NOTE — TELEPHONE ENCOUNTER
Spoke with patient, she forgot to mention yesterday that she did recently start taking a [facility] Contains Vit E which can increase bleeding risk. Education provided to patient.     Reviewed chart, warfarin dose was reduced yesterday and patient is scheduled for 2 week INR recheck. No change in plan.     Patient verbalized understanding and had no other questions.

## 2021-06-13 NOTE — TELEPHONE ENCOUNTER
Refill Approved    Rx renewed per Medication Renewal Policy. Medication was last renewed on 6/21/20.    Maryan Mansfield, [facility] Connection Triage/Med Refill 12/18/2020     Requested Prescriptions   Pending Prescriptions Disp Refills     atenoloL (TENORMIN) 100 MG tablet [Pharmacy Med Name: ATENOLOL 100 MG TABLET] 90 tablet 1     Sig: TAKE 1 TABLET BY MOUTH EVERY DAY       Beta-Blockers Refill Protocol Passed - 12/17/2020  3:40 AM        Passed - PCP or prescribing provider visit in past 12 months or next 3 months     Last office visit with prescriber/PCP: 10/22/2020 Loli Alberts MD OR same dept: 10/22/2020 Loli Alberts MD OR same specialty: 10/22/2020 Loli Alberts MD  Last physical: 7/14/2020 Last MTM visit: Visit date not found   Next visit within 3 mo: Visit date not found  Next physical within 3 mo: Visit date not found  Prescriber OR PCP: Carlos) RASTA Alberts MD  Last diagnosis associated with med order: 1. Essential hypertension  - atenoloL (TENORMIN) 100 MG tablet [Pharmacy Med Name: ATENOLOL 100 MG TABLET]; TAKE 1 TABLET BY MOUTH EVERY DAY  Dispense: 90 tablet; Refill: 1    If protocol passes may refill for 12 months if within 3 months of last provider visit (or a total of 15 months).             Passed - Blood pressure filed in past 12 months     BP Readings from Last 1 Encounters:   11/16/20 118/88

## 2021-06-13 NOTE — PROGRESS NOTES
[facility]      Code Status:  FULL CODE  Visit Type: Review Of Multiple Medical Conditions     Facility:  [facility] [[id number]]           History of Present Illness: Elvira Bush is a 77 y.o. female who is currently readmitted to the TCU as a transfer from the hospital  This is a 77-year-old who was recently in the TCU when she was admitted after she had seizures of unexplained etiology.  Subsequently this she was discharged home and her Keppra dosage was decreased to 500 twice daily  Presented to the hospital with left-sided pain after she tripped and fell over a curb she was admitted with a left comminuted pubic rami fracture with a left sacral fracture and displaced left L5 transverse process fracture.  In addition she had a possible nondisplaced L3 spinous process fracture along with a pelvic hematoma.  He was seen both by orthopedics as well as trauma surgery and has been advised conservative treatment with no surgical intervention as yet.  She is partially weightbearing with a walker on her left lower extremity.  Pain management is on Tylenol along with tramadol.  She is on chronic anticoagulation for A. Fib his Coumadin which was held in her hospitalization.  Her admission INR was 2.6 and she was given vitamin K and fresh frozen plasma to reverse it her hemoglobin upon discharge was felt to be stable at 8.3 and her Coumadin has been resumed at the time of her discharge.  Her pelvic hematoma has remained stable.  She also underwent a CT cystogram but no evidence of bladder perforation was noted and she will be monitored for her INRs closely  Was also noted to have a UTI with cultures growing Klebsiella and remains on antibiotics.  Other medical problems including a past history of breast cancer as well as chronic A. Fib; retention and hyperlipidemia are stable.  For her seizure disorder she remains on a low-dose of Keppra 500 mg twice a day  She remains in  significant amount of pain and has been complaining that her pain is not controlled but she does respond well to tramadol.  She is hoping to rebound quickly and is somewhat frustrated    Past Medical History:   Diagnosis Date     Atrial fibrillation      Breast cancer 3-2014     GERD (gastroesophageal reflux disease)      HTN (hypertension)      Hx of radiation therapy 5-2014    Left Breast     Hypercholesterolemia      Osteopenia      Skin cancer      Past Surgical History:   Procedure Laterality Date     BREAST LUMPECTOMY Left 3-2014     KNEE ARTHROSCOPY Right 2009     [facility]  7/27/2017          SKIN CANCER EXCISION       TENDON RELEASE       Family History   Problem Relation Age of Onset     Coronary artery disease Mother      No Medical Problems Daughter      Stroke Maternal Grandmother      Prostate cancer Maternal Grandfather      Cerebral aneurysm Maternal Aunt      No Medical Problems Paternal Aunt      Skin cancer Son      BRCA 1/2 Neg Hx      Breast cancer Neg Hx      Cancer Neg Hx      Colon cancer Neg Hx      Endometrial cancer Neg Hx      Ovarian cancer Neg Hx      Social History     Social History     Marital status:      Spouse name: N/A     Number of children: N/A     Years of education: N/A     Occupational History     Not on file.     Social History Main Topics     Smoking status: Never Smoker     Smokeless tobacco: Never Used     Alcohol use 1.2 oz/week     2 Glasses of wine per week      Comment: no alcohol this summer     Drug use: No     Sexual activity: Not on file     Other Topics Concern     Not on file     Social History Narrative     Current Outpatient Prescriptions   Medication Sig Dispense Refill     acetaminophen (TYLENOL) 500 MG tablet Take 2 tablets (1,000 mg total) by mouth 3 (three) times a day. For 10 day then as needed  0     aspirin 81 MG tablet Take 81 mg by mouth daily.       atenolol (TENORMIN) 100 MG tablet Take 100 mg by mouth daily.       atorvastatin (LIPITOR) 20 MG  tablet Take 20 mg by mouth daily.       calcium-vitamin D (CALCIUM-VITAMIN D) 500 mg(1,250mg) -200 unit per tablet Take 1 tablet by mouth 2 (two) times a day with meals.        cephalexin (KEFLEX) 500 MG capsule Take 1 capsule (500 mg total) by mouth 2 (two) times a day for 7 days. 14 capsule 0     hydroCHLOROthiazide (HYDRODIURIL) 25 MG tablet Take 25 mg by mouth daily.       levETIRAcetam (KEPPRA) 500 MG tablet Take 1 tablet (500 mg total) by mouth 2 (two) times a day. 60 tablet 0     magnesium oxide (MAG-OX) 400 mg tablet Take 1 tablet (400 mg total) by mouth daily.  0     melatonin 5 mg Tab tablet Take 5 mg by mouth at bedtime as needed.       multivitamin (MULTIVITAMIN) per tablet Take 1 tablet by mouth daily.       neomycin-bacitracin-polymyxin (NEOSPORIN) ointment On laceration for 1 week  0     omeprazole (PRILOSEC) 20 MG capsule Take 20 mg by mouth daily before breakfast.       senna-docusate (PERICOLACE) 8.6-50 mg tablet Take 1 tablet by mouth daily.  0     traMADol (ULTRAM) 50 mg tablet Take 0.5-1 tablets (25-50 mg total) by mouth every 6 (six) hours as needed. 30 tablet 0     warfarin (COUMADIN) 5 MG tablet Take 2.5 mg by mouth See Admin Instructions. Take 2.5 mg on Mon/Wed/Fri.  Take 5 mg on Sat/Sun/Tues/Thurs.       warfarin (COUMADIN) 5 MG tablet Take 5 mg by mouth See Admin Instructions. Take 2.5 mg on Mon/Wed/Fri.  Take 5 mg on Sat/Sun/Tues/Thurs.       No current facility-administered medications for this visit.      No Known Allergies      Review of Systems:    Constitutional: Negative.  Negative for fever, chills, HAS activity change, appetite change and fatigue.   HENT: Negative for congestion and facial swelling.    Eyes: Negative for photophobia, redness and visual disturbance.   Respiratory: Negative for cough and chest tightness.    Cardiovascular: Negative for chest pain, palpitations and leg swelling.   Gastrointestinal: Negative for nausea, diarrhea, constipation, blood in stool and  abdominal distention.   Genitourinary: Negative.    Musculoskeletal: Negative.  pain in left leg and pelvis with movement  Skin: Negative.    Neurological: Negative for dizziness, tremors, syncope, weakness, light-headedness and headaches.   Hematological: Does not bruise/bleed easily.   Psychiatric/Behavioral: Negative.      Vitals:    09/14/17 1122   BP: 132/76   Pulse: 91   Temp: 98  F (36.7  C)       Physical Exam:    GENERAL: no acute distress. Cooperative in conversation.   HEENT: pupils are equal, round and reactive. Oral mucosa is moist and intact.  RESP:Chest symmetric. Regular respiratory rate. No stridor.  CVS: S1S2  ABD: Nondistended, soft. Tender on left side of pelvis and back of thigh but can move her legs well  EXTREMITIES: No lower extremity edema.   NEURO: non focal. Alert and oriented x3.   PSYCH: within normal limits. No depression or anxiety.  SKIN: warm dry intact       Labs:    Lab Results   Component Value Date    WBC 7.2 09/12/2017    HGB 8.3 (L) 09/12/2017    HCT 24.8 (L) 09/12/2017    MCV 87 09/12/2017     09/12/2017   INR 2.5    Ct Abdomen Pelvis Without Oral Without Iv Contrast  Result Date: 9/9/2017  . CONCLUSION: 1.  Comminuted fracture of the left pubic bone as described above. No pubic diastases. There is a moderate hematoma in the anterior prevesicular space of Retzius, with a smaller amount of intraperitoneal blood products in the pelvis and lower abdomen. Recommend correlation with CT cystogram to evaluate for bladder injury. 2.  Left sacral ala zones 1 and 2 fracture extending into the left S1 and S2 neuroforamina. 3.  Minimally displaced fracture of the left L5 transverse process. 4.  Suspected nondisplaced fracture of the L3 spinous process. 5.  Colonic diverticulosis. 6.  Cholelithiasis.  .  Assessment/Plan:    Status post mechanical fall with resultant left comminuted pubic rami fracture  Left sacral fracture  Displaced left L5 transverse process fracture and possible  nondisplaced L3 spinous process fracture-seen by orthopedics and trauma surgery and recommended conservative treatment partial weightbearing using a walker and discharged to the TCU for rehabilitation  Pelvic hematoma-Coumadin has been held initially.  But due to stable hemoglobins at 8.3 and stable hematoma she is back on Coumadin with target INR is between 2-3 preferably less than 2.5  Anemia secondary to blood loss discharge hemoglobin was 8.3 and will be monitored in the TCU..  Check ordered for next week.  She was discharged with a low hemoglobin   complex partial seizures.  Continue with her Keppra 500 twice a day and outpatient follow-up with neurology  Klebsiella UTI currently on antibiotics denies any dysuria .  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 fib continue with her beta-blocker  Currently back on her baseline dose of anticoagulation with Coumadin  Hypertension-Continue with her home dosage of atenolol and hydrochlorothiazide  Hyperlipidemia-on lipitor  History of breast cancer status post radiation treatment currently in remission  PAIN Management discharged on low-dose of tramadol along with scheduled Tylenol monitor response  At her request schedule tramadol 25mg  PO QID   Alternative modalities including diathermy also being utilized by staff she was advised some icing.  Debilitation here for PT OT and rehab.  She is very upset at baseline she has had significant lifestyle changes since she had her seizure and is no longer driving and looking at moving to an independent living versus [facility] soon  Her primary  cares physician is also suspecting some baseline cognitive impairment and her so will wait and see how her cognitive testing results are.  Last CPT was 5 and slums was 24/30.  Continue with her care plan monitor response to alternative modalities and scheduling pain pills she  was advised to monitor for constipation confusion and sedation    Total time spent was 35 minutes, more than half of it was in face-to-face counseling regarding disease state, treatment, side effects, documentation, review of clinical data and coordination of care  Electronically signed by: BLAKE Lee  This progress note was completed using Dragon software and there may be grammatical errors.

## 2021-06-13 NOTE — TELEPHONE ENCOUNTER
Called pt. Yes according to last visit with Dr. Reynolds yes she is to keep taking lisinopril to keep her b/p good and to help keep up her potassium due to her other medications she takes. Pt aware and will sched a f/u in 5 months time at her regular interval. Otherwise earlier if having any issues.

## 2021-06-13 NOTE — PROGRESS NOTES
[facility]      Code Status:  FULL CODE  Visit Type: Review Of Multiple Medical Conditions     Facility:  [facility] [[id number]]           History of Present Illness: Elvira Bush is a 77 y.o. female who is currently readmitted to the TCU as a transfer from the hospital  This is a 77-year-old who was recently in the TCU when she was admitted after she had seizures of unexplained etiology.  Subsequentl Keppra dosage was decreased to 500 twice daily  Presented to the hospital with left-sided pain after she tripped and fell over a curb she was admitted with a left comminuted pubic rami fracture with a left sacral fracture and displaced left L5 transverse process fracture.  In addition she had a possible nondisplaced L3 spinous process fracture along with a pelvic hematoma.  sHe was seen both by orthopedics as well as trauma surgery and has been advised conservative treatment with no surgical intervention as yet.  She is partially weightbearing with a walker on her left lower extremity.  Pain management is on Tylenol along with tramadol.  She is on chronic anticoagulation for A. Fib his Coumadin which was held in her hospitalization.  Her admission INR was 2.6 and she was given vitamin K and fresh frozen plasma to reverse it her hemoglobin upon discharge was felt to be stable at 8.3 and her Coumadin has been resumed at the time of her discharge.  Her pelvic hematoma has remained stable.  She also underwent a CT cystogram but no evidence of bladder perforation was noted and she will be monitored for her INRs closely  Was also noted to have a UTI with cultures growing Klebsiella and remains on antibiotics.  She remains in significant amount of pain and has been complaining that her pain is not controlled but she does respond well to tramadol.  She is hoping to rebound quickly and is somewhat frustrated  Not using any off her as needed medications for pain and therapy is trying  alternative modalities also for her.    Past Medical History:   Diagnosis Date     Atrial fibrillation      Breast cancer 3-2014     GERD (gastroesophageal reflux disease)      HTN (hypertension)      Hx of radiation therapy 5-2014    Left Breast     Hypercholesterolemia      Osteopenia      Skin cancer      Past Surgical History:   Procedure Laterality Date     BREAST LUMPECTOMY Left 3-2014     KNEE ARTHROSCOPY Right 2009     PIC  7/27/2017          SKIN CANCER EXCISION       TENDON RELEASE       Family History   Problem Relation Age of Onset     Coronary artery disease Mother      No Medical Problems Daughter      Stroke Maternal Grandmother      Prostate cancer Maternal Grandfather      Cerebral aneurysm Maternal Aunt      No Medical Problems Paternal Aunt      Skin cancer Son      BRCA 1/2 Neg Hx      Breast cancer Neg Hx      Cancer Neg Hx      Colon cancer Neg Hx      Endometrial cancer Neg Hx      Ovarian cancer Neg Hx      Social History     Social History     Marital status:      Spouse name: N/A     Number of children: N/A     Years of education: N/A     Occupational History     Not on file.     Social History Main Topics     Smoking status: Never Smoker     Smokeless tobacco: Never Used     Alcohol use 1.2 oz/week     2 Glasses of wine per week      Comment: no alcohol this summer     Drug use: No     Sexual activity: Not on file     Other Topics Concern     Not on file     Social History Narrative     Current Outpatient Prescriptions   Medication Sig Dispense Refill     acetaminophen (TYLENOL) 500 MG tablet Take 2 tablets (1,000 mg total) by mouth 3 (three) times a day. For 10 day then as needed  0     aspirin 81 MG tablet Take 81 mg by mouth daily.       atenolol (TENORMIN) 100 MG tablet Take 100 mg by mouth daily.       atorvastatin (LIPITOR) 20 MG tablet Take 20 mg by mouth daily.       calcium-vitamin D (CALCIUM-VITAMIN D) 500 mg(1,250mg) -200 unit per tablet Take 1 tablet by mouth 2 (two)  times a day with meals.        cephalexin (KEFLEX) 500 MG capsule Take 1 capsule (500 mg total) by mouth 2 (two) times a day for 7 days. 14 capsule 0     hydroCHLOROthiazide (HYDRODIURIL) 25 MG tablet Take 25 mg by mouth daily.       levETIRAcetam (KEPPRA) 500 MG tablet Take 1 tablet (500 mg total) by mouth 2 (two) times a day. 60 tablet 0     magnesium oxide (MAG-OX) 400 mg tablet Take 1 tablet (400 mg total) by mouth daily.  0     melatonin 5 mg Tab tablet Take 5 mg by mouth at bedtime as needed.       multivitamin (MULTIVITAMIN) per tablet Take 1 tablet by mouth daily.       neomycin-bacitracin-polymyxin (NEOSPORIN) ointment On laceration for 1 week  0     omeprazole (PRILOSEC) 20 MG capsule Take 20 mg by mouth daily before breakfast.       senna-docusate (PERICOLACE) 8.6-50 mg tablet Take 1 tablet by mouth daily.  0     traMADol (ULTRAM) 50 mg tablet Take 0.5-1 tablets (25-50 mg total) by mouth every 6 (six) hours as needed. 30 tablet 0     warfarin (COUMADIN) 5 MG tablet Take 2.5 mg by mouth See Admin Instructions. Take 2.5 mg on Mon/Wed/Fri.  Take 5 mg on Sat/Sun/Tues/Thurs.       warfarin (COUMADIN) 5 MG tablet Take 5 mg by mouth See Admin Instructions. Take 2.5 mg on Mon/Wed/Fri.  Take 5 mg on Sat/Sun/Tues/Thurs.       No current facility-administered medications for this visit.      No Known Allergies      Review of Systems:    Constitutional: Negative.  Negative for fever, chills, HAS activity change, appetite change and fatigue.   HENT: Negative for congestion and facial swelling.    Eyes: Negative for photophobia, redness and visual disturbance.   Respiratory: Negative for cough and chest tightness.    Cardiovascular: Negative for chest pain, palpitations and leg swelling.   Gastrointestinal: Negative for nausea, diarrhea, constipation, blood in stool and abdominal distention.   Genitourinary: Negative.    Musculoskeletal: Negative.  pain in left leg and pelvis with movement  Skin: Negative.     Neurological: Negative for dizziness, tremors, syncope, weakness, light-headedness and headaches.   Hematological: Does not bruise/bleed easily.   Psychiatric/Behavioral: Negative.      Vitals:    09/19/17 1041   BP: 130/81   Pulse: 85   Temp: 98  F (36.7  C)       Physical Exam:    GENERAL: no acute distress. Cooperative in conversation.   HEENT: pupils are equal, round and reactive. Oral mucosa is moist and intact.  RESP:Chest symmetric. Regular respiratory rate. No stridor.  CVS: S1S2  ABD: Nondistended, soft. Tender on left side of pelvis and back of thigh but can move her legs well  EXTREMITIES: No lower extremity edema.   NEURO: non focal. Alert and oriented x3.   PSYCH: within normal limits. No depression or anxiety.  SKIN: warm dry intact       Labs:    Recent Results (from the past 48 hour(s))   Basic Metabolic Panel   Result Value Ref Range    Sodium 138 136 - 145 mmol/L    Potassium 3.7 3.5 - 5.0 mmol/L    Chloride 101 98 - 107 mmol/L    CO2 28 22 - 31 mmol/L    Anion Gap, Calculation 9 5 - 18 mmol/L    Glucose 95 70 - 125 mg/dL    Calcium 9.4 8.5 - 10.5 mg/dL    BUN 19 8 - 28 mg/dL    Creatinine 0.82 0.60 - 1.10 mg/dL    GFR MDRD Af Amer >60 >60 mL/min/1.73m2    GFR MDRD Non Af Amer >60 >60 mL/min/1.73m2   HM2(CBC w/o Differential)   Result Value Ref Range    WBC 9.9 4.0 - 11.0 thou/uL    RBC 3.24 (L) 3.80 - 5.40 mill/uL    Hemoglobin 9.7 (L) 12.0 - 16.0 g/dL    Hematocrit 29.8 (L) 35.0 - 47.0 %    MCV 92 80 - 100 fL    MCH 29.9 27.0 - 34.0 pg    MCHC 32.6 32.0 - 36.0 g/dL    RDW 17.3 (H) 11.0 - 14.5 %    Platelets 344 140 - 440 thou/uL    MPV 9.9 8.5 - 12.5 fL       Lab Results   Component Value Date    WBC 7.2 09/12/2017    HGB 8.3 (L) 09/12/2017    HCT 24.8 (L) 09/12/2017    MCV 87 09/12/2017     09/12/2017   INR 2.5    Ct Abdomen Pelvis Without Oral Without Iv Contrast  Result Date: 9/9/2017  . CONCLUSION: 1.  Comminuted fracture of the left pubic bone as described above. No pubic diastases.  There is a moderate hematoma in the anterior prevesicular space of Retzius, with a smaller amount of intraperitoneal blood products in the pelvis and lower abdomen. Recommend correlation with CT cystogram to evaluate for bladder injury. 2.  Left sacral ala zones 1 and 2 fracture extending into the left S1 and S2 neuroforamina. 3.  Minimally displaced fracture of the left L5 transverse process. 4.  Suspected nondisplaced fracture of the L3 spinous process. 5.  Colonic diverticulosis. 6.  Cholelithiasis.  .  Assessment/Plan:    Status post mechanical fall with resultant left comminuted pubic rami fracture  Left sacral fracture  Displaced left L5 transverse process fracture and possible nondisplaced L3 spinous process fracture-seen by orthopedics and trauma surgery and recommended conservative treatment partial weightbearing using a walker and discharged to the TCU for rehabilitation.  Slow progress per pt  Pelvic hematoma-Coumadin has been held initially.  But due to stable hemoglobins at 8.3 and stable hematoma she is back on Coumadin with target INR is between 2-3 preferably less than 2.5  Anemia secondary to blood loss discharge hemoglobin was 8.3 and will be monitored in the TCU..  R/c improved at 9.7  reCheck ordered and results P   She was discharged with a low hemoglobin   complex partial seizures.  Continue with her Keppra 500 twice a day and outpatient follow-up with neurology  Klebsiella UTI currently on antibiotics denies any dysuria .  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 fib continue with her beta-blocker  Currently back on her baseline dose of anticoagulation with Coumadin  Hypertension-Continue with her home dosage of atenolol and hydrochlorothiazide  Hyperlipidemia-on lipitor  History of breast cancer status post radiation treatment currently in remission  PAIN Management discharged  on low-dose of tramadol along with scheduled Tylenol monitor response  At her request schedule tramadol 25mg  PO QID   Alternative modalities including diathermy also being utilized by staff she was advised some icing.  Debilitation here for PT OT and rehab.  She is very upset at baseline she has had significant lifestyle changes since she had her seizure and is no longer driving and looking at moving to an independent living versus [facility] soon  Her primary  cares physician is also suspecting some baseline cognitive impairment and her so will wait and see how her cognitive testing results are.  Last CPT was 5 and slums was 24/30.  Continue with her care plan monitor response to alternative modalities and scheduling pain pills she was advised to monitor for constipation confusion and sedation.  At her request no change made in her pain regimen as yet.  She is continues to have significant pain and was advised to use as needed narcotics which are available to her too    Total time spent was 35 minutes, more than half of it was in face-to-face counseling regarding disease state, treatment, side effects, documentation, review of clinical data and coordination of care  Electronically signed by: BLAKE Lee  This progress note was completed using Dragon software and there may be grammatical errors.

## 2021-06-13 NOTE — PROGRESS NOTES
SUBJECTIVE: Evlira Bush is a 77 y.o. Caucasion female who presents today with her son to follow-up on her hospital stay at [facility] from September 9 - September 12 due to a left sacral and pubic rami fracture, displaced L5 transverse process fracture, and mechanical fall after tripping off a curb.  During the hospital stay her pain was controlled and she consulted with orthopedics who declared that she was not a surgical candidate at this time.  Elvira did demonstrate some anemia(hgb 8.3) which was most likely related to the significant bruising she experienced along her hip and buttocks secondary to her warfarin use/ anticoagulation for atrial fibrillation . She was treated with tramadol for her pain.  The patient discharged to [facility] for physical therapy and subsequently discharged from there on October 4, 2017.  During her TCU stay her strength and stability improved to the point that she was able to ambulate with a walker.  She has continued with outpatient therapy twice a week and has recently transitioned from the walker to a cane which she is quite excited about.  She has been able to remain off of narcotics despite continued pain in the hip.  She follows up with Dr. Ye in orthopedics next week for most likely the final time and will continue with therapy at least for the foreseeable future.     When patient discharged from [facility] as she was not able to return to home. In November she plans to transition to [facility].    The patient continues with her Keppra for her epilepsy and needs to have dosing labs drawn today.  She has a scheduled sleep deprivation EEG for next week.  She asks if her decreased appetite might be related to taking the Keppra which we discussed as a very strong possibility.    I personally reviewed the patient's most recent imaging during her hospitalization stay which demonstrated:    1.   Comminuted fracture of the left pubic bone as described above. No pubic diastases. There is a moderate hematoma in the anterior prevesicular space of Retzius, with a smaller amount of intraperitoneal blood products in the pelvis and lower abdomen.   Recommend correlation with CT cystogram to evaluate for bladder injury.     2.  Left sacral ala zones 1 and 2 fracture extending into the left S1 and S2 neuroforamina.     3.  Minimally displaced fracture of the left L5 transverse process.     4.  Suspected nondisplaced fracture of the L3 spinous process.     5.  Colonic diverticulosis.     6.  Cholelithiasis.     Medication reconciliation was done today. Patients last INR was 2.5 on 10/4 and she needs recheck.    Review of Systems - History obtained from the patient  General ROS:  Weight loss  Psychological ROS: negative  Ophthalmic ROS: positive for - uses glasses  ENT ROS: negative  Allergy and Immunology ROS: negative  Hematological and Lymphatic ROS: negative  Endocrine ROS: negative  Breast ROS: negative  Respiratory ROS: negative  Cardiovascular ROS: negative  Gastrointestinal ROS: decreased appetite  Genito-Urinary ROS: negative  Musculoskeletal ROS: positive for - joint stiffness and muscle pain  Neurological ROS: negative  Dermatological ROS: negative      OBJECTIVE: /80  Pulse 72  Resp 16  Wt 142 lb 9.6 oz (64.7 kg)  BMI 23.37 kg/m2  Physical Examination: General appearance - alert, well appearing, and in no distress  Mental status - normal mood, behavior, speech, dress, motor activity, and thought processes  Chest - clear to auscultation, no wheezes, rales or rhonchi, symmetric air entry  Heart - normal rate and regular rhythm, S1 and S2 normal, no murmurs noted  Abdomen - soft, nontender, nondistended, no masses or organomegaly  Back exam - limited range of motion  Neurological - alert, oriented, normal speech, no focal findings or movement disorder noted, cranial nerves II through XII  intact  Musculoskeletal -gait diminished.  Increased pain with flexion extension of the hips.  Extremities - peripheral pulses normal, no pedal edema, no clubbing or cyanosis  Skin - normal coloration and turgor, no rashes, no suspicious skin lesions noted    ASSESSMENT & PLAN:    1. Pelvic fracture     2. Closed fracture of spinous process of lumbar vertebra     3. Anemia, unspecified type  HM2(CBC w/o Differential)   4. Osteopenia  DXA Bone Density Scan   5. Chronic atrial fibrillation  INR   6. Essential hypertension  Basic Metabolic Panel   7. Epilepsy with partial complex seizures, without status epilepticus  Levetiracetam [Keppra ]   8. Preventative health care  Influenza High Dose, Seasonal 65+ yrs      I discussed with the patient that due to her osteopenia she is at an increased risk for fractures.  She is due for a repeat DEXA scan  and is interested in pursuing this.  We discussed that she can get this done along with her mammogram on hopefully the same day as her sleep deprivation EEG.  Keppra levels drawn today along with a review of her anemia and INR labs.  High-dose flu today. Follow up with me in 6 months.    Patient Active Problem List   Diagnosis     Osteopenia     Esophageal reflux     Breast cancer of lower-outer quadrant of left female breast     Essential Hypercholesterolemia     Essential hypertension     Obesity     Headache     Elevated LFTs     Meningioma     Chronic atrial fibrillation     Acute respiratory failure, unspecified whether with hypoxia or hypercapnia     Convulsions, unspecified convulsion type     Increased tracheal secretions     Acute cystitis without hematuria     Pulmonary congestion     Encephalopathy     Transient alteration of awareness     Epilepsy with partial complex seizures, without status epilepticus     Chronic a-fib     Pelvic fracture     Closed fracture of spinous process of lumbar vertebra     Intraabdominal hemorrhage     Closed displaced fracture of left  mya, initial encounter       Current Outpatient Prescriptions on File Prior to Visit   Medication Sig Dispense Refill     aspirin 81 MG tablet Take 81 mg by mouth daily.       atenolol (TENORMIN) 100 MG tablet Take 100 mg by mouth daily.       atorvastatin (LIPITOR) 20 MG tablet Take 20 mg by mouth daily.       calcium-vitamin D (CALCIUM-VITAMIN D) 500 mg(1,250mg) -200 unit per tablet Take 1 tablet by mouth 2 (two) times a day with meals.        hydroCHLOROthiazide (HYDRODIURIL) 25 MG tablet Take 25 mg by mouth daily.       levETIRAcetam (KEPPRA) 500 MG tablet Take 1 tablet (500 mg total) by mouth 2 (two) times a day. 60 tablet 0     magnesium oxide (MAG-OX) 400 mg tablet Take 1 tablet (400 mg total) by mouth daily.  0     multivitamin (MULTIVITAMIN) per tablet Take 1 tablet by mouth daily.       omeprazole (PRILOSEC) 20 MG capsule Take 20 mg by mouth daily before breakfast.       warfarin (COUMADIN) 3 MG tablet Take 1 tablet (3 mg) by mouth daily OR AS INSTRUCTED. Adjust dose based on INR results as directed. 30 tablet 2     acetaminophen (TYLENOL) 500 MG tablet Take 2 tablets (1,000 mg total) by mouth 3 (three) times a day. For 10 day then as needed  0     No current facility-administered medications on file prior to visit.                Loli Alberts MD ([facility])

## 2021-06-13 NOTE — PROGRESS NOTES
[facility]      Code Status:  FULL CODE  Visit Type: Review Of Multiple Medical Conditions     Facility:  [facility] [[id number]]           History of Present Illness: Elvira Bush is a 77 y.o. female who is currently readmitted to the TCU as a transfer from the hospital  This is a 77-year-old who was recently in the TCU when she was admitted after she had seizures of unexplained etiology.  Subsequentl Keppra dosage was decreased to 500 twice daily  Presented to the hospital with left-sided pain after she tripped and fell over a curb she was admitted with a left comminuted pubic rami fracture with a left sacral fracture and displaced left L5 transverse process fracture.  In addition she had a possible nondisplaced L3 spinous process fracture along with a pelvic hematoma.  sHe was seen both by orthopedics as well as trauma surgery and has been advised conservative treatment with no surgical intervention as yet.  She is partially weightbearing with a walker on her left lower extremity.  Pain management is on Tylenol along with tramadol.  She is on chronic anticoagulation for A. Fib his Coumadin which was held in her hospitalization.  Her admission INR was 2.6 and she was given vitamin K and fresh frozen plasma to reverse it her hemoglobin upon discharge was felt to be stable at 8.3 and her Coumadin has been resumed at the time of her discharge.  Her pelvic hematoma has remained stable.  She also underwent a CT cystogram but no evidence of bladder perforation was noted and she will be monitored for her INRs closely  Was also noted to have a UTI with cultures growing Klebsiella .  She is doing much better.  At worst the pain is a 2 as per her  She is hoping to discharge home soon.  In the meantime she is awaiting an orthopedic appointment this week    Past Medical History:   Diagnosis Date     Atrial fibrillation      Breast cancer 3-2014     GERD (gastroesophageal reflux  disease)      HTN (hypertension)      Hx of radiation therapy 5-2014    Left Breast     Hypercholesterolemia      Osteopenia      Skin cancer      Past Surgical History:   Procedure Laterality Date     BREAST LUMPECTOMY Left 3-2014     KNEE ARTHROSCOPY Right 2009     [facility]  7/27/2017          SKIN CANCER EXCISION       TENDON RELEASE       Family History   Problem Relation Age of Onset     Coronary artery disease Mother      No Medical Problems Daughter      Stroke Maternal Grandmother      Prostate cancer Maternal Grandfather      Cerebral aneurysm Maternal Aunt      No Medical Problems Paternal Aunt      Skin cancer Son      BRCA 1/2 Neg Hx      Breast cancer Neg Hx      Cancer Neg Hx      Colon cancer Neg Hx      Endometrial cancer Neg Hx      Ovarian cancer Neg Hx      Social History     Social History     Marital status:      Spouse name: N/A     Number of children: N/A     Years of education: N/A     Occupational History     Not on file.     Social History Main Topics     Smoking status: Never Smoker     Smokeless tobacco: Never Used     Alcohol use 1.2 oz/week     2 Glasses of wine per week      Comment: no alcohol this summer     Drug use: No     Sexual activity: Not on file     Other Topics Concern     Not on file     Social History Narrative     Current Outpatient Prescriptions   Medication Sig Dispense Refill     acetaminophen (TYLENOL) 500 MG tablet Take 2 tablets (1,000 mg total) by mouth 3 (three) times a day. For 10 day then as needed  0     aspirin 81 MG tablet Take 81 mg by mouth daily.       atenolol (TENORMIN) 100 MG tablet Take 100 mg by mouth daily.       atorvastatin (LIPITOR) 20 MG tablet Take 20 mg by mouth daily.       calcium-vitamin D (CALCIUM-VITAMIN D) 500 mg(1,250mg) -200 unit per tablet Take 1 tablet by mouth 2 (two) times a day with meals.        hydroCHLOROthiazide (HYDRODIURIL) 25 MG tablet Take 25 mg by mouth daily.       levETIRAcetam (KEPPRA) 500 MG tablet Take 1 tablet  (500 mg total) by mouth 2 (two) times a day. 60 tablet 0     magnesium oxide (MAG-OX) 400 mg tablet Take 1 tablet (400 mg total) by mouth daily.  0     melatonin 5 mg Tab tablet Take 5 mg by mouth at bedtime as needed.       multivitamin (MULTIVITAMIN) per tablet Take 1 tablet by mouth daily.       omeprazole (PRILOSEC) 20 MG capsule Take 20 mg by mouth daily before breakfast.       senna-docusate (PERICOLACE) 8.6-50 mg tablet Take 1 tablet by mouth daily.  0     traMADol (ULTRAM) 50 mg tablet Take 0.5-1 tablets (25-50 mg total) by mouth every 6 (six) hours as needed. 30 tablet 0     warfarin (COUMADIN) 5 MG tablet Take 2.5 mg by mouth See Admin Instructions. Take 2.5 mg on Mon/Wed/Fri.  Take 5 mg on Sat/Sun/Tues/Thurs.       warfarin (COUMADIN) 5 MG tablet Take 5 mg by mouth See Admin Instructions. Take 2.5 mg on Mon/Wed/Fri.  Take 5 mg on Sat/Sun/Tues/Thurs.       No current facility-administered medications for this visit.      No Known Allergies      Review of Systems:    Constitutional: Negative.  Negative for fever, chills, HAS activity change, appetite change and fatigue.   HENT: Negative for congestion and facial swelling.    Eyes: Negative for photophobia, redness and visual disturbance.   Respiratory: Negative for cough and chest tightness.    Cardiovascular: Negative for chest pain, palpitations and leg swelling.   Gastrointestinal: Negative for nausea, diarrhea, constipation, blood in stool and abdominal distention.   Genitourinary: Negative.    Musculoskeletal: Negative.  pain in left leg and pelvis with movement  Skin: Negative.    Neurological: Negative for dizziness, tremors, syncope, weakness, light-headedness and headaches.   Hematological: Does not bruise/bleed easily.   Psychiatric/Behavioral: Negative.      Vitals:    09/26/17 1101   BP: 152/86   Pulse: 80   Temp: 98  F (36.7  C)       Physical Exam:    GENERAL: no acute distress. Cooperative in conversation.   HEENT: pupils are equal, round and  reactive. Oral mucosa is moist and intact.  RESP:Chest symmetric. Regular respiratory rate. No stridor.  CVS: S1S2  ABD: Nondistended, soft. Tender on left side of pelvis and back of thigh but can move her legs well  EXTREMITIES: No lower extremity edema.   NEURO: non focal. Alert and oriented x3.   PSYCH: within normal limits. No depression or anxiety.  SKIN: warm dry intact     Labs:        Lab Results   Component Value Date    WBC 8.6 09/22/2017    HGB 9.4 (L) 09/22/2017    HCT 28.7 (L) 09/22/2017    MCV 93 09/22/2017     09/22/2017   INR 2.    Ct Abdomen Pelvis Without Oral Without Iv Contrast  Result Date: 9/9/2017  . CONCLUSION: 1.  Comminuted fracture of the left pubic bone as described above. No pubic diastases. There is a moderate hematoma in the anterior prevesicular space of Retzius, with a smaller amount of intraperitoneal blood products in the pelvis and lower abdomen. Recommend correlation with CT cystogram to evaluate for bladder injury. 2.  Left sacral ala zones 1 and 2 fracture extending into the left S1 and S2 neuroforamina. 3.  Minimally displaced fracture of the left L5 transverse process. 4.  Suspected nondisplaced fracture of the L3 spinous process. 5.  Colonic diverticulosis. 6.  Cholelithiasis.  .  Assessment/Plan:    Status post mechanical fall with resultant left comminuted pubic rami fracture  Left sacral fracture  Displaced left L5 transverse process fracture and possible nondisplaced L3 spinous process fracture-seen by orthopedics and trauma surgery and recommended conservative treatment partial weightbearing using a walker and discharged to the TCU for rehabilitation.  Follow-up with orthopedics scheduled this week.  Pelvic hematoma-Coumadin has been held initially.  But due to stable hemoglobins at 8.3 and stable hematoma she is back on Coumadin with target INR is between 2-3 preferably less than 2.5  Monitor INRs  Anemia secondary to blood loss discharge hemoglobin was 8.3 and  will be monitored in the TCU..  R/c improved at 9.7 now 9.4   complex partial seizures.  Continue with her Keppra 500 twice a day and outpatient follow-up with neurology  Klebsiella UTI currently on antibiotics denies any dysuria .  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 fib continue with her beta-blocker  Currently back on her baseline dose of anticoagulation with Coumadin  Hypertension-Continue with her home dosage of atenolol and hydrochlorothiazide  Hyperlipidemia-on lipitor  History of breast cancer status post radiation treatment currently in remission  PAIN Management doing well on her current regimen pain is rated at 2  She has an orthopedic appointment this week.  Based on the recommendation may consider taking her off her scheduled pain pills and discharge planning she is currently 50% weightbearing on left lower extremity but is hoping for restoration of full weightbearing.  Her testing results include a balance score of 20/28.  Cognitive scores include a slums of 26/30 and a CPT of 5.3      Electronically signed by: BLAKE Lee  This progress note was completed using Dragon software and there may be grammatical errors.

## 2021-06-13 NOTE — PROGRESS NOTES
[facility]      Code Status:  FULL CODE  Visit Type: Review Of Multiple Medical Conditions     Facility:  [facility] [[id number]]           History of Present Illness: Elvira Bush is a 77 y.o. female who is currently readmitted to the TCU as a transfer from the hospital  This is a 77-year-old who was recently in the TCU when she was admitted after she had seizures of unexplained etiology.  Subsequentl Keppra dosage was decreased to 500 twice daily  Presented to the hospital with left-sided pain after she tripped and fell over a curb she was admitted with a left comminuted pubic rami fracture with a left sacral fracture and displaced left L5 transverse process fracture.  In addition she had a possible nondisplaced L3 spinous process fracture along with a pelvic hematoma.  sHe was seen both by orthopedics as well as trauma surgery and has been advised conservative treatment with no surgical intervention as yet.  She is partially weightbearing with a walker on her left lower extremity.  Pain management is on Tylenol along with tramadol.  Barely using her Tylenol now she is off her scheduled tramadol .  INR is 2.3 today  Her pelvic hematoma has remained stable.  She also underwent a CT cystogram but no evidence of bladder perforation was noted and she will be monitored for her INRs closely  Was also noted to have a UTI with cultures growing Klebsiella .  She is doing much better.  At worst the pain is a 2 as per her  She is hoping to discharge home soon.  In the meantime she is going for orthopedic appointment today    Past Medical History:   Diagnosis Date     Atrial fibrillation      Breast cancer 3-2014     GERD (gastroesophageal reflux disease)      HTN (hypertension)      Hx of radiation therapy 5-2014    Left Breast     Hypercholesterolemia      Osteopenia      Skin cancer      Past Surgical History:   Procedure Laterality Date     BREAST LUMPECTOMY Left 3-2014      KNEE ARTHROSCOPY Right 2009     [facility]  7/27/2017          SKIN CANCER EXCISION       TENDON RELEASE       Family History   Problem Relation Age of Onset     Coronary artery disease Mother      No Medical Problems Daughter      Stroke Maternal Grandmother      Prostate cancer Maternal Grandfather      Cerebral aneurysm Maternal Aunt      No Medical Problems Paternal Aunt      Skin cancer Son      BRCA 1/2 Neg Hx      Breast cancer Neg Hx      Cancer Neg Hx      Colon cancer Neg Hx      Endometrial cancer Neg Hx      Ovarian cancer Neg Hx      Social History     Social History     Marital status:      Spouse name: N/A     Number of children: N/A     Years of education: N/A     Occupational History     Not on file.     Social History Main Topics     Smoking status: Never Smoker     Smokeless tobacco: Never Used     Alcohol use 1.2 oz/week     2 Glasses of wine per week      Comment: no alcohol this summer     Drug use: No     Sexual activity: Not on file     Other Topics Concern     Not on file     Social History Narrative     Current Outpatient Prescriptions   Medication Sig Dispense Refill     acetaminophen (TYLENOL) 500 MG tablet Take 2 tablets (1,000 mg total) by mouth 3 (three) times a day. For 10 day then as needed  0     aspirin 81 MG tablet Take 81 mg by mouth daily.       atenolol (TENORMIN) 100 MG tablet Take 100 mg by mouth daily.       atorvastatin (LIPITOR) 20 MG tablet Take 20 mg by mouth daily.       calcium-vitamin D (CALCIUM-VITAMIN D) 500 mg(1,250mg) -200 unit per tablet Take 1 tablet by mouth 2 (two) times a day with meals.        hydroCHLOROthiazide (HYDRODIURIL) 25 MG tablet Take 25 mg by mouth daily.       levETIRAcetam (KEPPRA) 500 MG tablet Take 1 tablet (500 mg total) by mouth 2 (two) times a day. 60 tablet 0     magnesium oxide (MAG-OX) 400 mg tablet Take 1 tablet (400 mg total) by mouth daily.  0     melatonin 5 mg Tab tablet Take 5 mg by mouth at bedtime as needed.       multivitamin  (MULTIVITAMIN) per tablet Take 1 tablet by mouth daily.       omeprazole (PRILOSEC) 20 MG capsule Take 20 mg by mouth daily before breakfast.       senna-docusate (PERICOLACE) 8.6-50 mg tablet Take 1 tablet by mouth daily.  0     traMADol (ULTRAM) 50 mg tablet Take 0.5-1 tablets (25-50 mg total) by mouth every 6 (six) hours as needed. 30 tablet 0     warfarin (COUMADIN) 5 MG tablet Take 2.5 mg by mouth See Admin Instructions. Take 2.5 mg on Mon/Wed/Fri.  Take 5 mg on Sat/Sun/Tues/Thurs.       warfarin (COUMADIN) 5 MG tablet Take 5 mg by mouth See Admin Instructions. Take 2.5 mg on Mon/Wed/Fri.  Take 5 mg on Sat/Sun/Tues/Thurs.       No current facility-administered medications for this visit.      No Known Allergies      Review of Systems:    Constitutional: Negative.  Negative for fever, chills, HAS activity change, appetite change and fatigue.   HENT: Negative for congestion and facial swelling.    Eyes: Negative for photophobia, redness and visual disturbance.   Respiratory: Negative for cough and chest tightness.    Cardiovascular: Negative for chest pain, palpitations and leg swelling.   Gastrointestinal: Negative for nausea, diarrhea, constipation, blood in stool and abdominal distention.   Genitourinary: Negative.    Musculoskeletal: Negative.  pain in left leg and pelvis with movement  Skin: Negative.    Neurological: Negative for dizziness, tremors, syncope, weakness, light-headedness and headaches.   Hematological: Does not bruise/bleed easily.   Psychiatric/Behavioral: Negative.      Vitals:    09/28/17 1117   BP: 115/80   Pulse: 83   Temp: 98  F (36.7  C)       Physical Exam:    GENERAL: no acute distress. Cooperative in conversation.   HEENT: pupils are equal, round and reactive. Oral mucosa is moist and intact.  RESP:Chest symmetric. Regular respiratory rate. No stridor.  CVS: S1S2  ABD: Nondistended, soft. Tender on left side of pelvis and back of thigh but can move her legs well  EXTREMITIES: No  lower extremity edema.   NEURO: non focal. Alert and oriented x3.   PSYCH: within normal limits. No depression or anxiety.  SKIN: warm dry intact     Labs:        Lab Results   Component Value Date    WBC 8.6 09/22/2017    HGB 9.4 (L) 09/22/2017    HCT 28.7 (L) 09/22/2017    MCV 93 09/22/2017     09/22/2017   INR 2.3    Ct Abdomen Pelvis Without Oral Without Iv Contrast  Result Date: 9/9/2017  . CONCLUSION: 1.  Comminuted fracture of the left pubic bone as described above. No pubic diastases. There is a moderate hematoma in the anterior prevesicular space of Retzius, with a smaller amount of intraperitoneal blood products in the pelvis and lower abdomen. Recommend correlation with CT cystogram to evaluate for bladder injury. 2.  Left sacral ala zones 1 and 2 fracture extending into the left S1 and S2 neuroforamina. 3.  Minimally displaced fracture of the left L5 transverse process. 4.  Suspected nondisplaced fracture of the L3 spinous process. 5.  Colonic diverticulosis. 6.  Cholelithiasis.  .  Assessment/Plan:    Status post mechanical fall with resultant left comminuted pubic rami fracture  Left sacral fracture  Displaced left L5 transverse process fracture and possible nondisplaced L3 spinous process fracture-seen by orthopedics and trauma surgery and recommended conservative treatment partial weightbearing using a walker and discharged to the TCU for rehabilitation.  Follow-up with orthopedics scheduled today.  Pelvic hematoma-Coumadin has been held initially.  But due to stable hemoglobins at 8.3 and stable hematoma she is back on Coumadin with target INR is between 2-3 preferably less than 2.5  Monitor INRs  Anemia secondary to blood loss discharge hemoglobin was 8.3 and will be monitored in the TCU..  R/c improved at 9.7 now 9.4   complex partial seizures.  Continue with her Keppra 500 twice a day and outpatient follow-up with neurology  Klebsiella UTI currently on antibiotics denies any dysuria .  She  seems to be Klebsiella colonized because during her last admission she had also had Klebsiella UTI continue to monitor response to antibiotic  chronic A. fib continue with her beta-blocker  Currently back on her baseline dose of anticoagulation with Coumadin  Hypertension-Continue with her home dosage of atenolol and hydrochlorothiazide  Hyperlipidemia-on lipitor  History of breast cancer status post radiation treatment currently in remission  PAIN Management doing well.  Currently done with her scheduled tramadol and using Tylenol sparingly.  Her testing results include a balance score of 20/28.  Cognitive scores include a slums of 26/30 and a CPT of 5.3  Follow-up on the recommendations from orthopedics she is hoping for restoration of her weightbearing status and discharge next week      Electronically signed by: BLAKE Lee  This progress note was completed using Dragon software and there may be grammatical errors.

## 2021-06-13 NOTE — TELEPHONE ENCOUNTER
Refill Approved    Rx renewed per Medication Renewal Policy. Medication was last renewed on 10/22/20.    Maryan Mansfield, [facility] Triage/Med Refill 11/16/2020     Requested Prescriptions   Pending Prescriptions Disp Refills     lisinopriL (PRINIVIL,ZESTRIL) 20 MG tablet [Pharmacy Med Name: LISINOPRIL 20 MG TABLET] 30 tablet 0     Sig: TAKE 1 TABLET BY MOUTH EVERY DAY       [facility] Inhibitors Refill Protocol Passed - 11/14/2020  8:32 AM        Passed - PCP or prescribing provider visit in past 12 months       Last office visit with prescriber/PCP: 10/22/2020 Loli Alberts MD OR same dept: 10/22/2020 Loli Alberts MD OR same specialty: 10/22/2020 Loli Alberts MD  Last physical: 7/14/2020 Last MTM visit: Visit date not found   Next visit within 3 mo: Visit date not found  Next physical within 3 mo: Visit date not found  Prescriber OR PCP: Loli Alberts MD ([facility])  Last diagnosis associated with med order: 1. Essential hypertension  - lisinopriL (PRINIVIL,ZESTRIL) 20 MG tablet [Pharmacy Med Name: LISINOPRIL 20 MG TABLET]; TAKE 1 TABLET BY MOUTH EVERY DAY  Dispense: 30 tablet; Refill: 0    If protocol passes may refill for 12 months if within 3 months of last provider visit (or a total of 15 months).             Passed - Serum Potassium in past 12 months     Lab Results   Component Value Date    Potassium 3.9 10/22/2020             Passed - Blood pressure filed in past 12 months     BP Readings from Last 1 Encounters:   11/16/20 118/88             Passed - Serum Creatinine in past 12 months     Creatinine   Date Value Ref Range Status   10/22/2020 0.94 0.60 - 1.10 mg/dL Final

## 2021-06-14 ENCOUNTER — RECORDS - HEALTHEAST (OUTPATIENT)
Dept: ADMINISTRATIVE | Facility: OTHER | Age: 81
End: 2021-06-14

## 2021-06-14 NOTE — TELEPHONE ENCOUNTER
ANTICOAGULATION  MANAGEMENT    Assessment     Today's INR result of 2.2 is Therapeutic (goal INR of 2.0-3.0)        Warfarin taken as previously instructed    No new diet changes affecting INR    No new medication/supplements affecting INR    Continues to tolerate warfarin with no reported s/s of bleeding or thromboembolism     Previous INR was Supratherapeutic    Plan:     Spoke on phone with Elvira regarding INR result and instructed:     Warfarin Dosing Instructions:  Continue current warfarin dose 2.5 mg daily on Mondays; and 5 mg daily rest of week  (0 % change)    Instructed patient to follow up no later than: 4 weeks. It has been 4 weeks since her last check    Education provided: importance of therapeutic range, importance of following up for INR monitoring at instructed interval and importance of taking warfarin as instructed    Pat verbalizes understanding and agrees to warfarin dosing plan.    Instructed to call the [facility] for any changes, questions or concerns. (#[phone])   ?   Fatemeh Shetty RN    Subjective/Objective:      Elvira Bush, a 80 y.o. female is on warfarin.     Elvira reports:     Home warfarin dose: verbally confirmed home dose with Pat and updated on anticoagulation calendar     Missed doses: No     Medication changes:  No     S/S of bleeding or thromboembolism:  No     New Injury or illness:  No     Changes in diet or alcohol consumption:  No     Upcoming surgery, procedure or cardioversion:  No    Anticoagulation Episode Summary     Current INR goal:  2.0-3.0   TTR:  46.9 % (1 y)   Next INR check:  2/2/2021   INR from last check:  2.20 (1/5/2021)   Weekly max warfarin dose:     Target end date:  Indefinite   INR check location:     Preferred lab:     Send INR reminders to:  LAINE ENRIQUEZ       Comments:           Anticoagulation Care Providers     Provider Role Specialty Phone number    Loli Alberts MD Referring Family Medicine [phone]

## 2021-06-14 NOTE — TELEPHONE ENCOUNTER
Refill Approved    Rx renewed per Medication Renewal Policy. Medication was last renewed on 2/12/20, last OV 10/22/20.    Carmela Covarrubias, [facility] Triage/Med Refill 1/24/2021     Requested Prescriptions   Pending Prescriptions Disp Refills     omeprazole (PRILOSEC) 20 MG capsule [Pharmacy Med Name: OMEPRAZOLE DR 20 MG CAPSULE] 90 capsule 3     Sig: TAKE 1 CAPSULE BY MOUTH EVERY DAY       GI Medications Refill Protocol Passed - 1/23/2021  9:25 AM        Passed - PCP or prescribing provider visit in last 12 or next 3 months.     Last office visit with prescriber/PCP: 10/22/2020 Loli Alberts MD OR same dept: 10/22/2020 Loli Alberts MD OR same specialty: 10/22/2020 Loli Alberts MD  Last physical: 7/14/2020 Last MTM visit: Visit date not found   Next visit within 3 mo: Visit date not found  Next physical within 3 mo: Visit date not found  Prescriber OR PCP: Loli Alberts MD (Betsy)  Last diagnosis associated with med order: 1. Gastroesophageal reflux disease without esophagitis  - omeprazole (PRILOSEC) 20 MG capsule [Pharmacy Med Name: OMEPRAZOLE DR 20 MG CAPSULE]; TAKE 1 CAPSULE BY MOUTH EVERY DAY  Dispense: 90 capsule; Refill: 3    2. Essential hypertension  - hydroCHLOROthiazide (HYDRODIURIL) 25 MG tablet [Pharmacy Med Name: HYDROCHLOROTHIAZIDE 25 MG TAB]; TAKE 1 TABLET BY MOUTH EVERY DAY  Dispense: 90 tablet; Refill: 3    3. Essential Hypercholesterolemia  - atorvastatin (LIPITOR) 20 MG tablet [Pharmacy Med Name: ATORVASTATIN 20 MG TABLET]; TAKE 1 TABLET BY MOUTH EVERYDAY AT BEDTIME  Dispense: 90 tablet; Refill: 3    If protocol passes may refill for 12 months if within 3 months of last provider visit (or a total of 15 months).                hydroCHLOROthiazide (HYDRODIURIL) 25 MG tablet [Pharmacy Med Name: HYDROCHLOROTHIAZIDE 25 MG TAB] 90 tablet 3     Sig: TAKE 1 TABLET BY MOUTH EVERY DAY       Diuretics/Combination Diuretics Refill Protocol  Passed - 1/23/2021  9:25  AM        Passed - Visit with PCP or prescribing provider visit in past 12 months     Last office visit with prescriber/PCP: 10/22/2020 Loli Alberts MD OR same dept: 10/22/2020 Loli Alberts MD OR same specialty: 10/22/2020 Loli Alberts MD  Last physical: 7/14/2020 Last MTM visit: Visit date not found   Next visit within 3 mo: Visit date not found  Next physical within 3 mo: Visit date not found  Prescriber OR PCP: Carlos) RASTA Alberts MD  Last diagnosis associated with med order: 1. Gastroesophageal reflux disease without esophagitis  - omeprazole (PRILOSEC) 20 MG capsule [Pharmacy Med Name: OMEPRAZOLE DR 20 MG CAPSULE]; TAKE 1 CAPSULE BY MOUTH EVERY DAY  Dispense: 90 capsule; Refill: 3    2. Essential hypertension  - hydroCHLOROthiazide (HYDRODIURIL) 25 MG tablet [Pharmacy Med Name: HYDROCHLOROTHIAZIDE 25 MG TAB]; TAKE 1 TABLET BY MOUTH EVERY DAY  Dispense: 90 tablet; Refill: 3    3. Essential Hypercholesterolemia  - atorvastatin (LIPITOR) 20 MG tablet [Pharmacy Med Name: ATORVASTATIN 20 MG TABLET]; TAKE 1 TABLET BY MOUTH EVERYDAY AT BEDTIME  Dispense: 90 tablet; Refill: 3    If protocol passes may refill for 12 months if within 3 months of last provider visit (or a total of 15 months).             Passed - Serum Potassium in past 12 months      Lab Results   Component Value Date    Potassium 3.9 10/22/2020             Passed - Serum Sodium in past 12 months      Lab Results   Component Value Date    Sodium 143 10/22/2020             Passed - Blood pressure on file in past 12 months     BP Readings from Last 1 Encounters:   11/16/20 118/88             Passed - Serum Creatinine in past 12 months      Creatinine   Date Value Ref Range Status   10/22/2020 0.94 0.60 - 1.10 mg/dL Final                atorvastatin (LIPITOR) 20 MG tablet [Pharmacy Med Name: ATORVASTATIN 20 MG TABLET] 90 tablet 3     Sig: TAKE 1 TABLET BY MOUTH EVERYDAY AT BEDTIME       Statins Refill Protocol (Hmg CoA  Reductase Inhibitors) Passed - 1/23/2021  9:25 AM        Passed - PCP or prescribing provider visit in past 12 months      Last office visit with prescriber/PCP: 10/22/2020 Loli Alberts MD OR same dept: 10/22/2020 Loli Alberts MD OR same specialty: 10/22/2020 Loli Alberts MD  Last physical: 7/14/2020 Last MTM visit: Visit date not found   Next visit within 3 mo: Visit date not found  Next physical within 3 mo: Visit date not found  Prescriber OR PCP: Loli Alberts MD (Betsy)  Last diagnosis associated with med order: 1. Gastroesophageal reflux disease without esophagitis  - omeprazole (PRILOSEC) 20 MG capsule [Pharmacy Med Name: OMEPRAZOLE DR 20 MG CAPSULE]; TAKE 1 CAPSULE BY MOUTH EVERY DAY  Dispense: 90 capsule; Refill: 3    2. Essential hypertension  - hydroCHLOROthiazide (HYDRODIURIL) 25 MG tablet [Pharmacy Med Name: HYDROCHLOROTHIAZIDE 25 MG TAB]; TAKE 1 TABLET BY MOUTH EVERY DAY  Dispense: 90 tablet; Refill: 3    3. Essential Hypercholesterolemia  - atorvastatin (LIPITOR) 20 MG tablet [Pharmacy Med Name: ATORVASTATIN 20 MG TABLET]; TAKE 1 TABLET BY MOUTH EVERYDAY AT BEDTIME  Dispense: 90 tablet; Refill: 3    If protocol passes may refill for 12 months if within 3 months of last provider visit (or a total of 15 months).

## 2021-06-15 ENCOUNTER — RECORDS - HEALTHEAST (OUTPATIENT)
Dept: ADMINISTRATIVE | Facility: OTHER | Age: 81
End: 2021-06-15

## 2021-06-15 LAB — INR PPP: 2.6 (ref 0.9–1.1)

## 2021-06-15 NOTE — TELEPHONE ENCOUNTER
"Anticoagulation Annual Referral Renewal Review    Elvira Bush's chart reviewed for annual renewal of referral to anticoagulation monitoring.        Criteria for anticoagulation nurse and/or pharmacist renewal met   Warfarin indication: Atrial Fibrillation Yes, per indication   Current with INR monitoring/compliant Yes Yes   Date of last office visit 10/22/2020 Yes, had office visit within last year   Time in Therapeutic Range (TTR) 47.5 % No, TTR < 60 %       Elvira Bush did NOT meet all criteria for anticoagulation management program initiated renewal and requires provider review. Using dot phrase, \".acmrenewalprovider\", please advise if Elvira's anticoagulation management referral should be renewed or if patient should be seen in office to review anticoagulation therapy      Santa Shultz RN  10:58 AM      "

## 2021-06-15 NOTE — TELEPHONE ENCOUNTER
ANTICOAGULATION  MANAGEMENT    Assessment     Today's INR result of 2.40 is Therapeutic (goal INR of 2.0-3.0)        Warfarin taken as previously instructed    No new diet changes affecting INR    No new medication/supplements affecting INR    Continues to tolerate warfarin with no reported s/s of bleeding or thromboembolism     Previous INR was Therapeutic    Plan:     Spoke on phone with Pat regarding INR result and instructed:      Warfarin Dosing Instructions:  Continue current warfarin dose 5 mg daily  (0 % change)    Instructed patient to follow up no later than: 4 weeks, lab scheduled.     Education provided: target INR goal and significance of current INR result, importance of following up for INR monitoring at instructed interval and importance of taking warfarin as instructed    Pat verbalizes understanding and agrees to warfarin dosing plan.    Instructed to call the [facility] for any changes, questions or concerns. (#[phone])   ?   Truman Richards RN    Subjective/Objective:      Elvira Bush, a 80 y.o. female is on warfarin. Pat      Pat reports:     Home warfarin dose: verbally confirmed home dose with Pat and updated on anticoagulation calendar     Missed doses: No     Medication changes:  No     S/S of bleeding or thromboembolism:  No     New Injury or illness:  No     Changes in diet or alcohol consumption:  No     Upcoming surgery, procedure or cardioversion:  No    Anticoagulation Episode Summary     Current INR goal:  2.0-3.0   TTR:  50.8 % (1 y)   Next INR check:  3/16/2021   INR from last check:  2.80 (3/2/2021)   Most recent INR:   2.40 (3/15/2021)   Weekly max warfarin dose:     Target end date:  Indefinite   INR check location:     Preferred lab:     Send INR reminders to:  LAINE ENRIQUEZ       Comments:           Anticoagulation Care Providers     Provider Role Specialty Phone number    Loli Alberts MD Referring Family Medicine [phone]

## 2021-06-15 NOTE — TELEPHONE ENCOUNTER
ANTICOAGULATION  MANAGEMENT    Assessment     Today's INR result of 2.8 is Therapeutic (goal INR of 2.0-3.0)        Warfarin taken as previously instructed    No new diet changes affecting INR    No new medication/supplements affecting INR    Continues to tolerate warfarin with no reported s/s of bleeding or thromboembolism     Previous INR was Subtherapeutic    Plan:     Spoke on phone with Pat regarding INR result and instructed:      Warfarin Dosing Instructions:  Continue current warfarin dose 5 mg daily  (0 % change)    Instructed patient to follow up no later than: 2 weeks- appointment made.    Education provided: importance of therapeutic range, target INR goal and significance of current INR result and importance of following up for INR monitoring at instructed interval    Pat verbalizes understanding and agrees to warfarin dosing plan.    Instructed to call the [facility] for any changes, questions or concerns. (#[phone])   ?   Santa Shultz RN    Subjective/Objective:      Elvira Bush, a 80 y.o. female is on warfarin. Pat      Pat reports:     Home warfarin dose: as updated on anticoagulation calendar per template     Missed doses: No     Medication changes:  No     S/S of bleeding or thromboembolism:  No     New Injury or illness:  No     Changes in diet or alcohol consumption:  No     Upcoming surgery, procedure or cardioversion:  No    Anticoagulation Episode Summary     Current INR goal:  2.0-3.0   TTR:  47.3 % (1 y)   Next INR check:  3/16/2021   INR from last check:  2.80 (3/2/2021)   Weekly max warfarin dose:     Target end date:  Indefinite   INR check location:     Preferred lab:     Send INR reminders to:  LAINE ENRIQUEZ       Comments:           Anticoagulation Care Providers     Provider Role Specialty Phone number    Loli Alberts MD Referring Family Medicine [phone]

## 2021-06-15 NOTE — TELEPHONE ENCOUNTER
Provider Review: Anticoagulation Annual Referral Renewal    ACM Renewal Decision:  Renew ACM warfarin management      INR Range:   Continue management at current INR goal   Anticipated Duration of Therapy (from today):  Long-term anticoagulation      Loli Alberts MD (Betsy)  11:31 AM

## 2021-06-16 PROBLEM — G40.109 LOCALIZATION-RELATED FOCAL EPILEPSY WITH SIMPLE PARTIAL SEIZURES (H): Chronic | Status: ACTIVE | Noted: 2019-04-19

## 2021-06-16 PROBLEM — Z85.3 HISTORY OF LEFT BREAST CANCER: Status: ACTIVE | Noted: 2019-05-08

## 2021-06-16 PROBLEM — G93.89 ENCEPHALOMALACIA ON IMAGING STUDY: Chronic | Status: ACTIVE | Noted: 2019-02-02

## 2021-06-16 NOTE — PROGRESS NOTES
[facility]  [address]  [place] [address]  Dept: [phone]  Dept Fax: [phone]  Primary Provider: Loli Alberts MD  Pre-op Performing Provider: LOLI ALBERTS      PREOPERATIVE EVALUATION:  Today's date: 4/15/2021    Elvira Bush is a 81 y.o. female who presents for a preoperative evaluation.    Surgical Information:  Surgery/Procedure: Left breast biopsy x2   Surgery Location:  [facility]  Surgeon: Dr. Didier Durán  Surgery Date: 4/21/21  Time of Surgery: TBD  Where patient plans to recover: At home with family  Fax number for surgical facility: Note does not need to be faxed, will be available electronically in Epic.    Type of Anesthesia Anticipated: Local with MAC    1. Preop general physical exam  Patient is to hold her warfarin with last dose to be Friday evening of 4/16.  It can be reinitiated the evening of the day after surgery.  - Electrocardiogram Perform and Read  - Hemoglobin    2. History of left breast cancer  Diagnosed in March 2014 and identified as triple negative grade 3.  Patient underwent surgery and radiation and declined chemotherapy.    3. Malignant neoplasm of lower-outer quadrant of left breast of female, estrogen receptor negative (H)  See #2.  Now with diagnostic mammogram showing thickening of the skin of left breast.    4. Localization-related focal epilepsy with simple partial seizures (H)  Well-controlled on her current regimen of Keppra 1250 mg in the morning and 1250 mg p.m.  [facility]    5. Chronic atrial fibrillation (H)  Anticoagulated on warfarin, rate controlled.  Patient will hold her Coumadin as instructed above.  - Electrocardiogram Perform and Read    6. Essential hypertension  Blood pressure today is a bit high.  Definitely not high enough to be worrisome.  - Basic Metabolic Panel    7. Osteopenia  Will monitor lab.  -  Vitamin D, Total (25-Hydroxy)    Patient is medically maximized,appropriate, and cleared for this surgery.    Subjective     HPI related to upcoming procedure: Patient has a history of triple negative breast cancer identified in March 2014 with subsequent surgery and radiation.  Patient had a screening mammogram on 2/11/2021 showing thickened skin of the left breast.  Diagnostic mammogram and ultrasound were done on 2/17/2021.  The studies confirmed a new thickening of the skin of the left breast.  No mass was noted.  Patient was referred to surgeon for biopsy of the area.  Dr. Didier Durán, her last surgeon, is scheduled to do the surgery on 4/21/2021 at [facility].    Preop Questions 4/15/2021   Have you ever had a heart attack or stroke? No   Have you ever had surgery on your heart or blood vessels, such as a stent placement, a coronary artery bypass, or surgery on an artery in your head, neck, heart, or legs? No   Do you have chest pain with activity? No   Do you have a history of  heart failure? No   Do you currently have a cold, bronchitis or symptoms of other infection? No   Do you have a cough, shortness of breath, or wheezing? No   Do you or anyone in your family have previous history of blood clots? No   Do you or does anyone in your family have a serious bleeding problem such as prolonged bleeding following surgeries or cuts? No   Have you ever had problems with anemia or been told to take iron pills? No   Have you had any abnormal blood loss such as black, tarry or bloody stools, or abnormal vaginal bleeding? No   Have you ever had a blood transfusion? No   Are you willing to have a blood transfusion if it is medically needed before, during, or after your surgery? Yes   Have you or any of your relatives ever had problems with anesthesia? No   Do you have sleep apnea, excessive snoring or daytime drowsiness? No   Do you have any artifical heart valves or other implanted medical devices like a pacemaker,  defibrillator, or continuous glucose monitor? No   Do you have artificial joints? No   Are you allergic to latex? No     Health Care Directive:  Patient has a Health Care Directive on file.     Preoperative Review of :    reviewed - no record of controlled substances prescribed.    A-FIB - Patient has a longstanding history of chronic A-fib currently on rate control. Current treatment regimen includes Warfarin for stroke prevention and denies significant symptoms of lightheadedness, palpitations or dyspnea.     HYPERLIPIDEMIA - Patient has a long history of significant Hyperlipidemia requiring medication for treatment with recent good control. Patient reports no problems or side effects with the medication.     HYPERTENSION - Patient has longstanding history of HTN , currently denies any symptoms referable to elevated blood pressure. Specifically denies chest pain, palpitations, dyspnea, orthopnea, PND or peripheral edema. Blood pressure readings have nearly been in normal range. Current medication regimen is as listed below. Patient denies any side effects of medication.       Review of Systems  CONSTITUTIONAL: NEGATIVE for fever, chills, change in weight  INTEGUMENTARY/SKIN: Thickening of skin on the left breast per mammogram  ENT/MOUTH: Negative for ear, mouth, and throat problems  RESP: NEGATIVE for significant cough or SOB  BREAST: Thickening of skin on left breast per mammogram  CV: NEGATIVE for chest pain, palpitations or peripheral edema  GI: heartburn or reflux  : incontinence-urge  MUSCULOSKELETAL: POSITIVE  for shoulder pain  NEURO: History of previous stroke and seizure, nothing new    Patient Active Problem List    Diagnosis Date Noted     History of left breast cancer 05/08/2019     Localization-related focal epilepsy with simple partial seizures (H) 04/19/2019     Encephalomalacia on imaging study 02/02/2019     Convulsions, unspecified convulsion type (H)      Meningioma (H)      Chronic  atrial fibrillation (H)      Elevated LFTs 04/22/2016     Osteopenia      Esophageal reflux      Breast cancer of lower-outer quadrant of left female breast (H)      Essential Hypercholesterolemia      Essential hypertension      Headache      Past Medical History:   Diagnosis Date     Acute respiratory failure, unspecified whether with hypoxia or hypercapnia (H)      Atrial fibrillation (H)      Breast cancer (H) 3-2014     Closed displaced fracture of left pubis, initial encounter (H)      Closed fracture of spinous process of lumbar vertebra (H) 9/9/2017     Encephalopathy      GERD (gastroesophageal reflux disease)      HTN (hypertension)      Hx of radiation therapy 5-2014    Left Breast     Hypercholesterolemia      Intraabdominal hemorrhage 9/9/2017     Osteopenia      Pelvic fracture (H) 9/9/2017     Skin cancer      Transient alteration of awareness      Past Surgical History:   Procedure Laterality Date     BREAST LUMPECTOMY Left 3-2014     KNEE ARTHROSCOPY Right 2009     PIC  7/27/2017          SKIN CANCER EXCISION       TENDON RELEASE       Current Outpatient Medications   Medication Sig Dispense Refill     aspirin 81 MG tablet Take 81 mg by mouth daily.       atenoloL (TENORMIN) 100 MG tablet TAKE 1 TABLET BY MOUTH EVERY DAY 90 tablet 3     atorvastatin (LIPITOR) 20 MG tablet TAKE 1 TABLET BY MOUTH EVERYDAY AT BEDTIME 90 tablet 2     calcium-vitamin D (CALCIUM-VITAMIN D) 500 mg(1,250mg) -200 unit per tablet Take 2 tablets by mouth in the morning and 1 tablet by mouth at night.             hydroCHLOROthiazide (HYDRODIURIL) 25 MG tablet Take 1 tablet (25 mg total) by mouth daily. 90 tablet 2     levETIRAcetam (KEPPRA) 500 MG tablet Take 1,250 mg by mouth 2 (two) times a day.        lisinopriL (PRINIVIL,ZESTRIL) 20 MG tablet TAKE 1 TABLET BY MOUTH EVERY DAY 90 tablet 3     magnesium oxide (MAG-OX) 400 mg tablet Take 1 tablet (400 mg total) by mouth daily.  0     multivitamin (MULTIVITAMIN) per tablet Take  "1 tablet by mouth daily.       omeprazole (PRILOSEC) 20 MG capsule TAKE 1 CAPSULE BY MOUTH EVERY DAY 90 capsule 2     warfarin ANTICOAGULANT (COUMADIN/JANTOVEN) 5 MG tablet Take 1/2-1 tablet (2.5-5 mg) by mouth daily as directed. Adjust dose per INR results. 90 tablet 1     No current facility-administered medications for this visit.        No Known Allergies    Social History     Tobacco Use     Smoking status: Never Smoker     Smokeless tobacco: Never Used   Substance Use Topics     Alcohol use: Yes     Alcohol/week: 2.0 standard drinks     Types: 2 Glasses of wine per week     Comment: no alcohol this summer      Family History   Problem Relation Age of Onset     Coronary artery disease Mother      No Medical Problems Daughter      Stroke Maternal Grandmother      Prostate cancer Maternal Grandfather      Cerebral aneurysm Maternal Aunt      No Medical Problems Paternal Aunt      Skin cancer Son      BRCA 1/2 Neg Hx      Breast cancer Neg Hx      Cancer Neg Hx      Colon cancer Neg Hx      Endometrial cancer Neg Hx      Ovarian cancer Neg Hx      Social History     Substance and Sexual Activity   Drug Use No        Objective     /84   Pulse 73   Temp 98.3  F (36.8  C) (Oral)   Resp 20   Ht 5' 2.5\" (1.588 m)   Wt 115 lb (52.2 kg)   SpO2 98%   BMI 20.70 kg/m    Physical Exam  General appearance - alert, well appearing, and in no distress and normal appearing weight  Mental status - normal mood, behavior, speech, dress, motor activity, and thought processes  Eyes - pupils equal and reactive, extraocular eye movements intact  Ears - bilateral TM's and external ear canals normal  Nose - normal and patent, no erythema, discharge   Mouth - not examined and Covered with mask  Neck - supple, no significant adenopathy, carotids upstroke normal bilaterally, no bruits, thyroid exam: thyroid is normal in size without nodules or tenderness  Lymphatics - no palpable lymphadenopathy, no hepatosplenomegaly  Chest - " clear to auscultation, no wheezes, rales or rhonchi, symmetric air entry  Heart - irregularly irregular rhythm with rate controlled, no murmur noted  Abdomen - soft, nontender, nondistended, no masses or organomegaly  Breasts - not examined  Back exam - full range of motion, no tenderness, palpable spasm or pain on motion  Neurological - alert, oriented, normal speech, no focal findings or movement disorder noted, cranial nerves II through XII intact, DTR's normal and symmetric  Musculoskeletal - no joint tenderness, deformity or swelling  Extremities - peripheral pulses normal, no pedal edema, no clubbing or cyanosis  Skin - normal coloration and turgor, no rashes, no suspicious skin lesions noted      Recent Labs   Lab Test 04/15/21  1222 04/13/21  1139 03/15/21  1142 10/22/20  1132 10/22/20  1132 06/30/20  0944 06/30/20  0944 04/19/19  1505 04/19/19  1505   HGB 13.4  --   --   --   --   --  13.7  --  12.8   PLT  --   --   --   --   --   --   --   --  160   INR  --  2.00* 2.40*   < >  --    < > 3.74*   < >  --      --   --   --  143  --  140   < > 144   K 4.1  --   --   --  3.9  --  3.9   < > 3.9   CREATININE 0.93  --   --   --  0.94  --  0.83   < > 0.88    < > = values in this interval not displayed.        PRE-OP Diagnostics:   Recent Results (from the past 48 hour(s))   Electrocardiogram Perform and Read    Collection Time: 04/15/21 12:12 PM   Result Value Ref Range    SYSTOLIC BLOOD PRESSURE      DIASTOLIC BLOOD PRESSURE      VENTRICULAR RATE 70 BPM    ATRIAL RATE 68 BPM    P-R INTERVAL      QRS DURATION 78 ms    Q-T INTERVAL 402 ms    QTC CALCULATION (BEZET) 434 ms    P Axis      R AXIS -63 degrees    T AXIS -2 degrees    MUSE DIAGNOSIS       Atrial fibrillation / Atrial flutter  Left axis deviation  Inferior infarct (cited on or before 20-JUN-2017)  Possible Anterior infarct (cited on or before 20-JUN-2017)  Abnormal ECG  When compared with ECG of 09-SEP-2017 10:35,  No significant change was  found  Confirmed by KIARA GUADARRAMA MD LOC:[facility] ([id number]) on 4/15/2021 1:05:22 PM     Basic Metabolic Panel    Collection Time: 04/15/21 12:22 PM   Result Value Ref Range    Sodium 145 136 - 145 mmol/L    Potassium 4.1 3.5 - 5.0 mmol/L    Chloride 106 98 - 107 mmol/L    CO2 30 22 - 31 mmol/L    Anion Gap, Calculation 9 5 - 18 mmol/L    Glucose 92 70 - 125 mg/dL    Calcium 10.1 8.5 - 10.5 mg/dL    BUN 25 8 - 28 mg/dL    Creatinine 0.93 0.60 - 1.10 mg/dL    GFR MDRD Af Amer >60 >60 mL/min/1.73m2    GFR MDRD Non Af Amer 58 (L) >60 mL/min/1.73m2   Hemoglobin    Collection Time: 04/15/21 12:22 PM   Result Value Ref Range    Hemoglobin 13.4 12.0 - 16.0 g/dL   Vitamin D, Total (25-Hydroxy)    Collection Time: 04/15/21 12:22 PM   Result Value Ref Range    Vitamin D, Total (25-Hydroxy) 53.3 30.0 - 80.0 ng/mL     EKG: appears normal, NSR, atrial fibrillation, rate 70    REVISED CARDIAC RISK INDEX (RCRI)   The patient has the following serious cardiovascular risks for perioperative complications:   - High risk surgery (>5% cardiac complication risk) = 1 point    RCRI INTERPRETATION: 1 point: Class II (low risk - 0.9% complication rate)         Signed Electronically by: Loli Alberts MD (Betsy)    Copy of this evaluation report is provided to requesting physician.

## 2021-06-16 NOTE — TELEPHONE ENCOUNTER
Called and spoke to Pat, preop is scheduled tomorrow 4/15.     Dr. Alberts, are you in agreement with 5 day warfarin hold prior to procedure on 4/21 and do you recommend bridging for Pat? Encounter is also routed to ACM pharmacist Kasia Alfonso if you would prefer PharmD input.

## 2021-06-16 NOTE — TELEPHONE ENCOUNTER
Telephone Encounter by Fatemeh Shetty RN at 3/20/2020  2:52 PM     Author: Fatemeh Shetty RN Service: -- Author Type: Registered Nurse    Filed: 3/20/2020  2:55 PM Encounter Date: 3/20/2020 Status: Attested    : Fatemeh Shetty RN (Registered Nurse) Cosigner: Loli Alberts MD at 3/20/2020  3:44 PM    Attestation signed by Loli Alberts MD at 3/20/2020  3:44 PM    ok                Anticoagulation Annual Referral Renewal Review    Elvira Bush's chart reviewed for annual renewal of referral to anticoagulation monitoring.        Criteria for anticoagulation nurse and/or pharmacist renewal met   Warfarin indication: Atrial Fibrillation Yes, per indication   Current with INR monitoring/compliant Yes Yes   Date of last office visit 12/20/19 Yes, had office visit within last year   Time in Therapeutic Range (TTR) 73.7 % Yes, TTR > 60%       Elvira Bush met all criteria for anticoagulation management program initiated renewal.  New INR standing orders and anticoagulation referral renewal placed.      Fatemeh Shetty RN  2:52 PM

## 2021-06-16 NOTE — PROGRESS NOTES
ANTICOAGULATION  MANAGEMENT: Discharge Review    Elvira Bush chart reviewed for anticoagulation continuity of care    Hospital admission on  4/21/4/21 for Left breast biopsy times 2.    Discharge disposition: Home    INR Results:     No results for input(s): INR in the last 168 hours.    Warfarin inpatient management: held warfarin due to pre procedure    Warfarin discharge instructions: home regimen continued     Medication Changes Affecting Anticoagulation: No    Additional Factors Affecting Anticoagulation: No    Plan     No adjustment to anticoagulation plan needed      Recommended follow up is scheduled    Patient not contacted.    Santa Shultz RN

## 2021-06-16 NOTE — TELEPHONE ENCOUNTER
Telephone Encounter by Agueda Pettit RN at 3/12/2020  1:15 PM     Author: Agueda Pettit RN Service: -- Author Type: Registered Nurse    Filed: 3/12/2020  1:22 PM Encounter Date: 3/12/2020 Status: Attested    : Agueda Pettit RN (Registered Nurse) Cosigner: Loli Alberts MD at 3/12/2020  2:15 PM    Attestation signed by Loli Alberts MD at 3/12/2020  2:15 PM    ok                ANTICOAGULATION  MANAGEMENT    Assessment     Today's INR result of 3.2 is Supratherapeutic (goal INR of 2.0-3.0)        Warfarin taken as previously instructed    Decreased greens/vitamin K intake may be affecting INR    No new medication/supplements affecting INR    Continues to tolerate warfarin with no reported s/s of bleeding or thromboembolism     Previous INR was Therapeutic    Plan:     Spoke with Elvira regarding INR result and instructed:     Warfarin Dosing Instructions:  Take 2.5 mg today only  then continue current warfarin dose 5 mg daily on  (0 % change)    Instructed patient to follow up no later than: 2 weeks     Education provided: importance of consistent vitamin K intake, importance of therapeutic range, target INR goal and significance of current INR result and importance of taking warfarin as instructed    Pat verbalizes understanding and agrees to warfarin dosing plan.    Instructed to call the [facility] for any changes, questions or concerns. (#[phone])   ?   Agueda Pettit RN    Subjective/Objective:      Elvira Bush, a 79 y.o. female is on warfarin.     Elvira reports:     Home warfarin dose: verbally confirmed home dose with Pat and updated on anticoagulation calendar     Missed doses: No     Medication changes:  No     S/S of bleeding or thromboembolism:  No     New Injury or illness:  No     Changes in diet or alcohol consumption:  Yes: had less greens this past week or plans to resume green intake      Upcoming surgery, procedure or cardioversion:   No    Anticoagulation Episode Summary     Current INR goal:   2.0-3.0   TTR:   73.7 % (1 y)   Next INR check:   3/26/2020   INR from last check:   3.20! (3/12/2020)   Weekly max warfarin dose:      Target end date:   Indefinite   INR check location:      Preferred lab:      Send INR reminders to:   LAINE ENRIQUEZ       Comments:            Anticoagulation Care Providers     Provider Role Specialty Phone number    Loli Alberts MD Referring Family Medicine [phone]

## 2021-06-16 NOTE — TELEPHONE ENCOUNTER
Telephone Encounter by Agueda Pettit RN at 3/26/2020  1:38 PM     Author: Agueda Pettit RN Service: -- Author Type: Registered Nurse    Filed: 3/26/2020  1:47 PM Encounter Date: 3/26/2020 Status: Attested    : Agueda Pettit RN (Registered Nurse) Cosigner: Miguel Hardin CNP at 3/26/2020  2:35 PM    Attestation signed by Miguel Hardin CNP at 3/26/2020  2:35 PM    Agree.    Miguel Hardin CNP                  ANTICOAGULATION  MANAGEMENT    Assessment     Today's INR result of 3.2 is Supratherapeutic (goal INR of 2.0-3.0)        Warfarin taken as previously instructed    No new diet changes affecting INR - resumed her greens     No new medication/supplements affecting INR    Continues to tolerate warfarin with no reported s/s of bleeding or thromboembolism     Previous INR was Supratherapeutic    Plan:     Spoke with Elvira regarding INR result and instructed:     Warfarin Dosing Instructions:  Change warfarin dose to 2.5 mg daily on Mondays and Thursdays; and 5 mg daily rest of week  (7.1 % change)    Instructed patient to follow up no later than: 2 weeks     Education provided: importance of therapeutic range, importance of following up for INR monitoring at instructed interval and importance of taking warfarin as instructed    Pat verbalizes understanding and agrees to warfarin dosing plan.    Instructed to call the [facility] for any changes, questions or concerns. (#[phone])   ?   Agueda Pettit RN    Subjective/Objective:      Elvira Bush, a 79 y.o. female is on warfarin.     Elvira reports:     Home warfarin dose: verbally confirmed home dose with Pat and updated on anticoagulation calendar     Missed doses: No     Medication changes:  No     S/S of bleeding or thromboembolism:  No     New Injury or illness:  No     Changes in diet or alcohol consumption:  No     Upcoming surgery, procedure or cardioversion:  No    Anticoagulation Episode Summary     Current INR goal:   2.0-3.0    TTR:   69.9 % (1 y)   Next INR check:   4/9/2020   INR from last check:   3.20! (3/26/2020)   Weekly max warfarin dose:      Target end date:   Indefinite   INR check location:      Preferred lab:      Send INR reminders to:   LAINE ENRIQUEZ       Comments:            Anticoagulation Care Providers     Provider Role Specialty Phone number    Loli Alberts MD Referring Family Medicine [phone]

## 2021-06-16 NOTE — TELEPHONE ENCOUNTER
Called and spoke to Pat, scheduled post procedure INR 1 week after she will be resuming warfarin. Tracking calendar updated.     Patient verbalized understanding and had no other questions.

## 2021-06-16 NOTE — TELEPHONE ENCOUNTER
Who is calling:  Patient  Reason for Call:  Patient was asked by  ACN to call back to inform she spoke with surgeon, who advised her to be off of warfarin 5 days ahead of surgery scheduled  04/21.  Date of last appointment with primary care:   Okay to leave a detailed message: Yes

## 2021-06-16 NOTE — TELEPHONE ENCOUNTER
ANTICOAGULATION  MANAGEMENT    Assessment     Today's INR result of 2.0 is Therapeutic (goal INR of 2.0-3.0)        Warfarin taken as previously instructed    No new diet changes affecting INR    No new medication/supplements affecting INR    Continues to tolerate warfarin with no reported s/s of bleeding or thromboembolism     Previous INR was Therapeutic     4/21  per patient it is going to be surface of her skin versus a tissue biopsy- instructed patient to call Dr Durán's office to confirm if warfarin needs to be held prior to procedure.    Scheduled for pre op visit with PCP on 4/15    Plan:     Spoke on phone with Pat regarding INR result and instructed:      Warfarin Dosing Instructions:  Continue current warfarin dose 5 mg daily  (0 % change)    Instructed patient to follow up no later than: 4 weeks, aopt made.  Patient is aware that plans will change if warfarin interruption is needed.    Education provided: importance of therapeutic range, target INR goal and significance of current INR result, importance of following up for INR monitoring at instructed interval and importance of taking warfarin as instructed    Pat verbalizes understanding and agrees to warfarin dosing plan.    Instructed to call the [facility] for any changes, questions or concerns. (#[phone])   ?   Santa Shultz RN    Subjective/Objective:      Elvira Bush, a 81 y.o. female is on warfarin. Pat      Pat reports:     Home warfarin dose: as updated on anticoagulation calendar per template     Missed doses: No     Medication changes:  No     S/S of bleeding or thromboembolism:  No     New Injury or illness:  No     Changes in diet or alcohol consumption:  No     Upcoming surgery, procedure or cardioversion:  Yes: see above    Anticoagulation Episode Summary     Current INR goal:  2.0-3.0   TTR:  55.8 % (1 y)   Next INR check:  5/11/2021   INR from last check:  2.00 (4/13/2021)   Weekly max warfarin dose:     Target end  date:  Indefinite   INR check location:     Preferred lab:     Send INR reminders to:  LAINE ENRIQUEZ       Comments:           Anticoagulation Care Providers     Provider Role Specialty Phone number    Loli Alberts MD Referring Family Medicine [phone]

## 2021-06-16 NOTE — TELEPHONE ENCOUNTER
Called patient to follow up on the radiologist recommendations for her to have a punch biopsy of her left breast.  Patient's plan was to see Dr. Durán, her original breast surgeon.  She said she is scheduled to see him this Friday, 3-19-21.  I asked her to call me to let me know how her appointment goes.  She agrees.

## 2021-06-16 NOTE — TELEPHONE ENCOUNTER
Patient called, left a voicemail that she saw her breast surgeon, Dr. Durán, who did not recommend a punch biopsy, but he does plan 2 skin biopsies on Pat.  She did not elaborate on when these will be done but said she is out of town this week.

## 2021-06-16 NOTE — TELEPHONE ENCOUNTER
I told the patient to have her last dose be Friday.  I do not think she needs a full 5 days as she is not having knee surgery or something!  Then she can get back up to speed quicker.  She is very low risk for bleed.  I told her to start her usual dose back up the day after her surgery.  Does that sound okay with you?

## 2021-06-17 NOTE — TELEPHONE ENCOUNTER
ANTICOAGULATION  MANAGEMENT    Assessment     Today's INR result of 2.6 is Therapeutic (goal INR of 2.0-3.0)        Warfarin taken as previously instructed    No new diet changes affecting INR    No new medication/supplements affecting INR    Continues to tolerate warfarin with no reported s/s of bleeding or thromboembolism     Previous INR was Subtherapeutic    Plan:     Spoke on phone with Pat regarding INR result and instructed:      Warfarin Dosing Instructions:  Continue current warfarin dose    5 mg every day       (0 % change)    Instructed patient to follow up no later than: 2 weeks, appointment made.    Education provided: importance of therapeutic range, target INR goal and significance of current INR result and importance of following up for INR monitoring at instructed interval    Pat verbalizes understanding and agrees to warfarin dosing plan.    Instructed to call the [facility] for any changes, questions or concerns. (#[phone])   ?   Santa Shultz RN    Subjective/Objective:      Elvira Bush, a 81 y.o. female is on warfarin. Pat      Pat reports: that she filled out the template, noted that  it was not scanned yet on chart at time of call.    Home warfarin dose: verbally confirmed home dose with Pat and updated on anticoagulation calendar     Missed doses: No     Medication changes:  No     S/S of bleeding or thromboembolism:  No     New Injury or illness:  No     Changes in diet or alcohol consumption:  No     Upcoming surgery, procedure or cardioversion:  No    Anticoagulation Episode Summary     Current INR goal:  2.0-3.0   TTR:  53.2 % (1 y)   Next INR check:  5/25/2021   INR from last check:  2.60 (5/11/2021)   Weekly max warfarin dose:     Target end date:  Indefinite   INR check location:     Preferred lab:     Send INR reminders to:  LAINE ENRIQUEZ       Comments:           Anticoagulation Care Providers     Provider Role Specialty Phone number    Loli Alberts,  MD [facility] [phone]

## 2021-06-17 NOTE — TELEPHONE ENCOUNTER
Telephone Encounter by Teagan Gomez RN at 12/22/2020  8:17 PM     Author: Teagan Gomez RN Service: -- Author Type: Registered Nurse    Filed: 12/22/2020  8:17 PM Encounter Date: 12/22/2020 Status: Signed    : Teagan Gomez RN (Registered Nurse)       RN cannot approve Refill Request    RN can NOT refill this medication med is not covered by policy/route to provider. Last office visit: 10/22/2020 Loli Alberts MD Last Physical: 7/14/2020 Last MTM visit: Visit date not found Last visit same specialty: 10/22/2020 Loli Alberts MD.  Next visit within 3 mo: Visit date not found  Next physical within 3 mo: Visit date not found      [facility] Connection Triage/Med Refill 12/22/2020    Requested Prescriptions   Pending Prescriptions Disp Refills   ? warfarin ANTICOAGULANT (COUMADIN/JANTOVEN) 5 MG tablet [Pharmacy Med Name: WARFARIN SODIUM 5 MG TABLET] 90 tablet 1     Sig: TAKE 1 TABLET (5 MG) BY MOUTH DAILY AS DIRECTED. ADJUST DOSE PER INR RESULTS.       Warfarin Refill Protocol  Failed - 12/22/2020 11:21 AM        Failed -  Route to appropriate pool/provider     Last Anticoagulation Summary:   Anticoagulation Episode Summary     Current INR goal:  2.0-3.0   TTR:  45.2 % (11.8 mo)   Next INR check:  1/4/2021   INR from last check:  3.50 (12/10/2020)   Weekly max warfarin dose:     Target end date:  Indefinite   INR check location:     Preferred lab:     Send INR reminders to:  LAINE ENRIQUEZ       Comments:           Anticoagulation Care Providers     Provider Role Specialty Phone number    Loli Alberts MD Referring Family Medicine [phone]                Passed - Provider visit in last year     Last office visit with prescriber/PCP: 10/22/2020 Loli Alberts MD OR same dept: 10/22/2020 Loli Alberts MD OR same specialty: 10/22/2020 Loli Alberts MD  Last physical: 7/14/2020 Last MTM visit: Visit date not found    Next appt  within 3 mo: Visit date not found Next physical within 3 mo: Visit date not found  Prescriber OR PCP: Loli Alberts MD ([place])  Last diagnosis associated with med order: 1. Chronic atrial fibrillation (H)  - warfarin ANTICOAGULANT (COUMADIN/JANTOVEN) 5 MG tablet [Pharmacy Med Name: WARFARIN SODIUM 5 MG TABLET]; Take 1 tablet (5 mg) by mouth daily as directed. Adjust dose per INR results.  Dispense: 90 tablet; Refill: 1    If protocol passes may refill for 6 months if within 3 months of last provider visit (or a total of 9 months).

## 2021-06-17 NOTE — PROGRESS NOTES
SUBJECTIVE: Elvira Bush is a 78 y.o. White or  female who presents today for her 6 month med check.  When I saw her last in October she had just had a fall with a pelvic fracture.  She is doing much better with this now.  She does have osteopenia which was shown on a DEXA scan in November of last year.  They decided they should recheck that in 1 years time.  We discuss whether it would be a good idea to go to osteoporosis clinic.  She prefers to put that off until she has had her repeat DEXA scan which I think is reasonable.  She has atrial fibrillation that is chronic and is on warfarin which is going well.  She had had a small stroke before being diagnosed.  She is on preventative Keppra and her level in November was good and she does not have to have follow-up with Dr. Tomas until a years time.  She has hypertension which is well controlled and her last LDL was mildly elevated at 102 and I would like to recheck that today.  She is exercising on a regular basis going to the pool 3 times a week for fitness exercise and going to balance workouts twice a week which are available at [facility] where she is living.  She is happy to report she has had weight loss and she is feeling great and has not been sick at all over the winter.    OBJECTIVE: /84 (Patient Site: Left Arm, Patient Position: Sitting, Cuff Size: Adult Regular)  Pulse 76  Temp 97.8  F (36.6  C) (Oral)   Resp 16  Wt 138 lb 12.8 oz (63 kg)  Breastfeeding? No  BMI 22.75 kg/m2  General: Healthy-appearing normal weight elderly female in no acute distress  Heart: Irregularly irregular  Lungs: Clear bilaterally  Abdomen: Soft  Extremities: Warm, dry and without edema  Psych: Mood is good    ASSESSMENT & PLAN:    1. Essential hypertension  Comprehensive Metabolic Panel   2. Essential Hypercholesterolemia  Lipid Cascade RANDOM   3. Osteopenia  Vitamin D, Total (25-Hydroxy)   4. Meningioma     5. Chronic a-fib  INR     I will  check the above-mentioned lab work and get back to her by mail and only call with grossly abnormal values.  Coumadin nursing will call her with Coumadin dosing changes according to her INR result.  I will refill meds as needed.  We will think about future treatment of her osteopenia.  She can follow-up in 6 months time.    Patient Active Problem List   Diagnosis     Osteopenia     Esophageal reflux     Breast cancer of lower-outer quadrant of left female breast     Essential Hypercholesterolemia     Essential hypertension     Obesity     Headache     Elevated LFTs     Meningioma     Chronic atrial fibrillation     Acute respiratory failure, unspecified whether with hypoxia or hypercapnia     Convulsions, unspecified convulsion type     Increased tracheal secretions     Acute cystitis without hematuria     Pulmonary congestion     Encephalopathy     Transient alteration of awareness     Epilepsy with partial complex seizures, without status epilepticus     Chronic a-fib     Pelvic fracture     Closed fracture of spinous process of lumbar vertebra     Intraabdominal hemorrhage     Closed displaced fracture of left pubis, initial encounter       Current Outpatient Prescriptions on File Prior to Visit   Medication Sig Dispense Refill     aspirin 81 MG tablet Take 81 mg by mouth daily.       atenolol (TENORMIN) 100 MG tablet Take 1 tablet (100 mg total) by mouth daily. 90 tablet 1     atorvastatin (LIPITOR) 20 MG tablet Take 1 tablet (20 mg total) by mouth at bedtime. 90 tablet 1     calcium-vitamin D (CALCIUM-VITAMIN D) 500 mg(1,250mg) -200 unit per tablet Take 1 tablet by mouth 2 (two) times a day with meals.        hydroCHLOROthiazide (HYDRODIURIL) 25 MG tablet Take 1 tablet (25 mg total) by mouth daily. 90 tablet 1     levETIRAcetam (KEPPRA) 500 MG tablet Take 1 tablet (500 mg total) by mouth 2 (two) times a day. 60 tablet 0     magnesium oxide (MAG-OX) 400 mg tablet Take 1 tablet (400 mg total) by mouth daily.  0      multivitamin (MULTIVITAMIN) per tablet Take 1 tablet by mouth daily.       omeprazole (PRILOSEC) 20 MG capsule Take 1 capsule (20 mg total) by mouth daily. 90 capsule 1     warfarin (COUMADIN) 3 MG tablet TAKE 1 TO 1 & 1/2 TABS (3 TO 4.5 MG) BY MOUTH DAILY. ADJUST DOSE BASED ON INR RESULTS AS DIRECTED. 135 tablet 1     acetaminophen (TYLENOL) 500 MG tablet Take 2 tablets (1,000 mg total) by mouth 3 (three) times a day. For 10 day then as needed  0     atenolol (TENORMIN) 100 MG tablet Take 100 mg by mouth daily.       atorvastatin (LIPITOR) 20 MG tablet Take 20 mg by mouth daily.       hydroCHLOROthiazide (HYDRODIURIL) 25 MG tablet Take 25 mg by mouth daily.       omeprazole (PRILOSEC) 20 MG capsule Take 20 mg by mouth daily before breakfast.       No current facility-administered medications on file prior to visit.

## 2021-06-17 NOTE — TELEPHONE ENCOUNTER
Telephone Encounter by Brenna Sims RN at 2/16/2021  1:13 PM     Author: Brenna Sims RN Service: -- Author Type: Registered Nurse    Filed: 2/16/2021  1:17 PM Encounter Date: 2/16/2021 Status: Signed    : Brenna Sims RN (Registered Nurse)       ANTICOAGULATION  MANAGEMENT    Assessment     Today's INR result of 1.9 is Subtherapeutic (goal INR of 2.0-3.0)        Warfarin taken as previously instructed    No new diet changes affecting INR    No new medication/supplements affecting INR    Continues to tolerate warfarin with no reported s/s of bleeding or thromboembolism     Previous INR was Subtherapeutic    Plan:     Spoke on phone with Pat regarding INR result and instructed:      Warfarin Dosing Instructions:  Change warfarin dose to 5 mg daily (7.7 % change)    Instructed patient to follow up no later than: 2 weeks    Education provided: importance of consistent vitamin K intake, importance of therapeutic range, target INR goal and significance of current INR result, importance of following up for INR monitoring at instructed interval and importance of taking warfarin as instructed    Pat verbalizes understanding and agrees to warfarin dosing plan.    Instructed to call the [facility] for any changes, questions or concerns. (#[phone])   ?   Brenna Sims RN    Subjective/Objective:      Elvira Bush, a 80 y.o. female is on warfarin. Pat      Pat reports:     Home warfarin dose: verbally confirmed home dose with Pat and updated on anticoagulation calendar     Missed doses: No     Medication changes:  No     S/S of bleeding or thromboembolism:  No     New Injury or illness:  No     Changes in diet or alcohol consumption:  No     Upcoming surgery, procedure or cardioversion:  No    Anticoagulation Episode Summary     Current INR goal:  2.0-3.0   TTR:  47.5 % (1 y)   Next INR check:  3/2/2021   INR from last check:  1.90 (2/16/2021)   Weekly max warfarin dose:      Target end date:  Indefinite   INR check location:     Preferred lab:     Send INR reminders to:  LAINE ENRIQUEZ       Comments:           Anticoagulation Care Providers     Provider Role Specialty Phone number    Loli Alberts MD Referring Family Medicine [phone]        Brenna Dunbar, RN, BSN  Anticoagulation Clinic

## 2021-06-17 NOTE — PROGRESS NOTES
[facility] Hematology and Oncology Progress Note    Patient: Elvira Bush  MRN: [id number]  Date of Service: 5/2/2018       Reason for Visit:    Scheduled 6-month follow up    Assessment and Plan:    1 Breast cancer of LOQ (L) female breast     T1c, NX, cM0   Triple negative    78 y.o.     2014, March - abnormal mammogram    03/24/14 - lumpectomy:    1.1 cm invasive ductal carcinoma,    Grade 3,     ER negative, WI barely positive, HER-2/lolita negative    Negative margins.      No lymph node in the biopsy sample.       Lymphovascular invasion.      Patient opted against chemotherapy.      07/14/14 - completed 6040 cGy breast conservation EBRT.    Every 6 month follow up since.    07/26 - 08/02/17 hospitalized with symptoms concerning for a stroke with slurred speech, left hand and left foot weakness.  CTA showed encephalomalacia involving the right temporal lobe likely due to chronic infarct.  There was also likely a subacute infarct in the left basal ganglia. CTA did not show any high grade stenosis or aneurysm.  A partially calcified likely meningioma along right aspect of anterio rfalx was also noted.  MRI was also negative for any acute ischemia.  In the ED she became unresponsive and started seizing which lasted for about 1 minute.  She was loaded with keppra and then started on maintenance dose.  She became unresponsive following the seizure and was intubated and admitted to the ICU.  She was extubated on 7/29/2017.  The patient was found to have a UTI and her culture grew klebsiella, treated with IV rocephin, switched to oral cefdinir at discharge.   She was diagnosed with atrial fibrillation on 6/20/17 during a pre op physical.  She was asymptomatic.  ECHO showed her LVEF @ 55%.  Once she was stabilized she was started on coumadin with an INR goal of 2-2.5.  CHADS2 vasc score is at least 2 given age and HTN and with the old stroke seen on MRI is likely higher.     09/09 - 09/12/17 hospitalized with a left  sacral and pubic rami fracture, displaced L5 transverse process fracture and mechanical fall after tripping off a curb.  Orthopedics declared that she was not a surgical candidate.  She was discharged to [facility] for physical therapy and subsequently discharged from there on October 4, 2017.  During her TCU stay her strength and stability improved to the point that she was able to ambulate with a walker and after PT transitioned to a cane.  She was taking Tylenol, 2-500 mg tabs twice daily during that time.    11/02/17 (B) screening mammogram - benign findings.    11/02/17 DXA scan - The spine bone density L1-L3 with T-score -1.3, stable compared to 2013.  Femoral bone densities show left total hip T- score -1.1 and right femoral neck T-score -1.3 and significant decline of 4.7% on the left hip and 5.6% on the right hip compared to 2013.  Trabecular bone score indicates good trabecular bone architecture.Appropriate evaluation and treatment recommended with follow up bone density scan in 1 year.    05/02/18:    Recent (B) screening mammogram - benign findings.    Recent DXA scan showed osteopenia and high fracture risk    OS CBC - basically WNL    OS CMP - basically WNL.    The patient looks and feels well and is in good spirits.  She is up and about without even a cane now.  She appears clinically and radiographically FCO.  She is very proud that she has stopped all Tylenol and ibuprofen, found she really didn't need to take them and her LFTs have returned to normal.     Continue calcium 2923-7852 mg and vitamin D 7452-5887 iu daily supplements    Continue walking/bone strengthening activities.    Continue self breast exams.    11/02/18 - 6 month follow up with HEME2 and BMP (or within 2 months of PCP values).    Yearly bilateral mammogram in November, 2018.      ECOG Performance:     ECOG Performance Status: 0    Distress Assessment:    Distress Assessment Score: No distress        TT > 25 minutes face to face  with > 50% counseling and care coordination.    Nabor Masters, CNP     CC: Loli Alberts MD (Betsy)  _____________________________________________    Interim History:    The patient presents looking and feeling well.  She denies pain, cough, fever, chills, shortness of breath, unusual headaches, visual or mentation disturbance, bowel or bladder issues.  Her appetite is good, and her weight has now stabilized after intentionally loosing about 25-30 pounds since last spring.  She hopes to hold at her current weight as it feels good to her.  She performs self breast exams in the shower and has not noted any concerning changes.       Pain Status:    Currently in Pain: No/denies  (2/10) Pelvis - managed with Tylenol p.r.n.    Review of Systems:    Constitutional  Constitutional (WDL): All constitutional elements are within defined limits  Neurosensory  Neurosensory (WDL): Exceptions to WDL  Peripheral Motor Neuropathy: None  Ataxia: Asymptomatic, clinical or diagnostic observations only, intervention not indicated (much improved)  Peripheral Sensory Neuropathy: None  Confusion: None  Syncope: None  Cardiovascular  Cardiovascular (WDL): All cardiovascular elements are within defined limits  Pulmonary  Respiratory (WDL): Within Defined Limits  Gastrointestinal  Gastrointestinal (WDL): All gastrointestinal elements are within defined limits  Genitourinary  Genitourinary (WDL): All genitourinary elements are within defined limits  Integumentary  Integumentary (WDL): All integumentary elements are within defined limits  Patient Coping  Patient Coping: Accepting  Distress Assessment  Distress Assessment Score: No distress  Accompanied by  Accompanied by: Alone    Past Histories:    Past Medical History:   Diagnosis Date     Atrial fibrillation      Breast cancer 3-2014     GERD (gastroesophageal reflux disease)      HTN (hypertension)      Hx of radiation therapy 5-2014    Left Breast     Hypercholesterolemia       "Osteopenia      Skin cancer          Past Surgical History:   Procedure Laterality Date     BREAST LUMPECTOMY Left 3-2014     KNEE ARTHROSCOPY Right 2009     [facility]  7/27/2017          SKIN CANCER EXCISION       TENDON RELEASE         Physical Exam:    Recent Vitals 5/2/2018   Height 5' 5.5\"   Weight 132 lbs 11 oz   BSA (m2) 1.67 m2   /88   Pulse 73   Temp -   Temp src -   SpO2 93   Some recent data might be hidden     General: Alert and oriented.  No distress.  Very pleasant.  HEENT: Anicteric sclera.  EOMI.  LIZZY.  No mucosal lesions.     Chest:  Lungs clear.    Cardiac: S1, S2 heard.  Regular rate and rhythm.  She has soft systolic murmur.    Abdomen: Soft, nontender or distended, no palpable hepatosplenomegaly, masses or ascites.    Extremities:   No edema, no cyanosis, no clubbing.    Lymphatics:  No palpable lymphadenopathy in the neck, supraclavicular fossa or the armpit.        Breasts:  Scar in the upper outer quadrant of left breast.  No palpable lesions, nipple discharge, skin changes in either breast or axilla.    Patient declined offer of female  during exam.    Lab Results:    Reviewed recent results from PCP with patient.    No results found for this or any previous visit (from the past 24 hour(s)).    Imaging:    No results found.      "

## 2021-06-17 NOTE — TELEPHONE ENCOUNTER
Telephone Encounter by Elma Pettit RN at 12/10/2020  3:37 PM     Author: Elma Pettit RN Service: -- Author Type: Registered Nurse    Filed: 12/10/2020  3:53 PM Encounter Date: 12/10/2020 Status: Signed    : Elma Pettit RN (Registered Nurse)       ANTICOAGULATION  MANAGEMENT    Assessment     Today's INR result of 3.5 is Supratherapeutic (goal INR of 2.0-3.0)        Warfarin taken as previously instructed    No new diet changes affecting INR    No new medication/supplements affecting INR    Continues to tolerate warfarin with no reported s/s of bleeding or thromboembolism     Previous INR was Therapeutic    Plan:     Spoke on phone with Elvira regarding INR result and instructed:     Warfarin Dosing Instructions:  Take warfarin 2.5 mg  then change warfarin dose to 2.5 mg daily on Mondays; and 5 mg daily rest of week  (7.1 % change)    Instructed patient to follow up no later than: 2 weeks  Lab scheduled blocked off for several days, scheduled as soon as possible     Education provided: target INR goal and significance of current INR result, importance of following up for INR monitoring at instructed interval, importance of taking warfarin as instructed, importance of notifying clinic for changes in medications, when to seek medical attention/emergency care, importance of notifying clinic for diarrhea, nausea/vomiting, reduced intake and/or illness and importance of notifying clinic of upcoming surgeries and procedures 2 weeks in advance    Pat verbalizes understanding and agrees to warfarin dosing plan.    Instructed to call the [facility] for any changes, questions or concerns. (#[phone])   ?   Elma Pettit RN    Subjective/Objective:      Elvira Bush, a 80 y.o. female is on warfarin.     Elvira reports:     Home warfarin dose: verbally confirmed home dose with Pat and updated on anticoagulation calendar     Missed doses: No     Medication changes:  No     S/S of  bleeding or thromboembolism:  No     New Injury or illness:  No     Changes in diet or alcohol consumption:  No     Upcoming surgery, procedure or cardioversion:  No    Anticoagulation Episode Summary     Current INR goal:  2.0-3.0   TTR:  47.0 % (1 y)   Next INR check:  1/4/2021   INR from last check:  3.50 (12/10/2020)   Weekly max warfarin dose:     Target end date:  Indefinite   INR check location:     Preferred lab:     Send INR reminders to:  LAINE ENRIQUEZ       Comments:           Anticoagulation Care Providers     Provider Role Specialty Phone number    Loli Alberts MD Referring Family Medicine [phone]

## 2021-06-17 NOTE — TELEPHONE ENCOUNTER
Telephone Encounter by Santa Shultz RN at 4/29/2021  2:59 PM     Author: Santa Shultz RN Service: -- Author Type: Registered Nurse    Filed: 4/29/2021  3:14 PM Encounter Date: 4/29/2021 Status: Signed    : Santa Shultz RN (Registered Nurse)       ANTICOAGULATION  MANAGEMENT    Assessment     Today's INR result of 1.7 is Subtherapeutic (goal INR of 2.0-3.0)        Warfarin recently held as instructed for left biopsy procedure done on 4/21 which may be affecting INR .Per patient she resumed her dose on 4/21 after the procedure.    No new diet changes affecting INR    No new medication/supplements affecting INR    Continues to tolerate warfarin with no reported s/s of bleeding or thromboembolism     Previous INR was Therapeutic     Per patient, she had the follow up appointment with Dr Durán today and that she was told that biopsy result was negative.    Plan:     Spoke on phone with Pat regarding INR result and instructed:      Warfarin Dosing Instructions:  7.5  mg booster dose then continue current warfarin dose 5 mg daily  (0 % change)    Instructed patient to follow up no later than: 1-2 weeks, appt made.    Education provided: importance of therapeutic range, target INR goal and significance of current INR result, importance of following up for INR monitoring at instructed interval and importance of taking warfarin as instructed    Pat verbalizes understanding and agrees to warfarin dosing plan.    Instructed to call the [facility] for any changes, questions or concerns. (#[phone])   ?   Santa Shultz RN    Subjective/Objective:      Elvira Bush, a 81 y.o. female is on warfarin. Pat      Pat reports:     Home warfarin dose: verbally confirmed home dose with Pat and updated on anticoagulation calendar     Missed doses: pre procedure     Medication changes:  No     S/S of bleeding or thromboembolism:  No     New Injury or illness:  No     Changes in diet or alcohol  consumption:  No     Upcoming surgery, procedure or cardioversion:  No    Anticoagulation Episode Summary     Current INR goal:  2.0-3.0   TTR:  54.3 % (1 y)   Next INR check:  5/13/2021   INR from last check:  1.70 (4/29/2021)   Weekly max warfarin dose:     Target end date:  Indefinite   INR check location:     Preferred lab:     Send INR reminders to:  LAINE ENRIQUEZ       Comments:           Anticoagulation Care Providers     Provider Role Specialty Phone number    Loli Alberts MD Referring [facility] Medicine [phone]

## 2021-06-17 NOTE — TELEPHONE ENCOUNTER
Telephone Encounter by Fatemeh Shetty RN at 2/2/2021  1:32 PM     Author: Fatemeh Shetty RN Service: -- Author Type: Registered Nurse    Filed: 2/2/2021  1:41 PM Encounter Date: 2/2/2021 Status: Signed    : Fatemeh Shetty RN (Registered Nurse)       ANTICOAGULATION  MANAGEMENT    Assessment     Today's INR result of 1.9 is Subtherapeutic (goal INR of 2.0-3.0)        Warfarin taken as previously instructed    Increased greens/vitamin K intake may be affecting INR    No new medication/supplements affecting INR    Continues to tolerate warfarin with no reported s/s of bleeding or thromboembolism     Previous INR was Therapeutic    Plan:     Spoke on phone with Pat regarding INR result and instructed:      Warfarin Dosing Instructions:  Take 5 mg today then continue current warfarin dose 2.5 mg daily on Tuesdays; and 5 mg daily rest of week  (0 % change)    Instructed patient to follow up no later than: two weeks.    Education provided: importance of consistent vitamin K intake, impact of vitamin K foods on INR, importance of therapeutic range, importance of following up for INR monitoring at instructed interval and importance of taking warfarin as instructed    Pat verbalizes understanding and agrees to warfarin dosing plan.    Instructed to call the [facility] for any changes, questions or concerns. (#[phone])   ?   Fatemeh Shetty RN    Subjective/Objective:      Elvira Bush, a 80 y.o. female is on warfarin. Pat      Pat reports:     Home warfarin dose: verbally confirmed home dose with Pat and updated on anticoagulation calendar     Missed doses: No     Medication changes:  No     S/S of bleeding or thromboembolism:  No     New Injury or illness:  No     Changes in diet or alcohol consumption:  Yes: she has had more salads. Discussed being consistent with the greens.     Upcoming surgery, procedure or cardioversion:  No    Anticoagulation Episode Summary     Current INR  goal:  2.0-3.0   TTR:  51.3 % (1 y)   Next INR check:  2/16/2021   INR from last check:  1.90 (2/2/2021)   Weekly max warfarin dose:     Target end date:  Indefinite   INR check location:     Preferred lab:     Send INR reminders to:  LAINE ENRIQUEZ       Comments:           Anticoagulation Care Providers     Provider Role Specialty Phone number    Loli Alberts MD Referring Family Medicine [phone]

## 2021-06-18 NOTE — PATIENT INSTRUCTIONS - HE
Patient Instructions by Loli Alberts MD at 7/14/2020  8:20 AM     Author: Loli Alberts MD Service: -- Author Type: Physician    Filed: 7/14/2020  8:37 AM Encounter Date: 7/14/2020 Status: Signed    : Loli Alberts MD (Physician)         Patient Education   Understanding USDA MyPlate  The USDA (US Department of Agriculture) has guidelines to help you make healthy food choices. These are called MyPlate. MyPlate shows the food groups that make up healthy meals using the image of a place setting. Before you eat, think about the healthiest choices for what to put onto your plate or into your cup or bowl. To learn more about building a healthy plate, visit www.choosemyplate.gov.       The Food Groups    Fruits: Any fruit or 100% fruit juice counts as part of the Fruit Group. Fruits may be fresh, canned, frozen, or dried, and may be whole, cut-up, or pureed. Make half your plate fruits and vegetables.    Vegetables: Any vegetable or 100% vegetable juice counts as a member of the Vegetable Group. Vegetables may be fresh, frozen, canned, or dried. They can be served raw or cooked and may be whole, cut-up, or mashed. Make half your plate fruits and vegetables.     Grains: All foods made from grains are part of the Grains Group. These include wheat, rice, oats, cornmeal, and barley such as bread, pasta, oatmeal, cereal, tortillas, and grits. Grains should be no more than a quarter of your plate. At least half of your grains should be whole grains.    Protein: This group includes meat, poultry, seafood, beans and peas, eggs, processed soy products (like tofu), nuts (including nut butters), and seeds. Make protein choices no more than a quarter of your plate. Meat and poultry choices should be lean or low fat.    Dairy: All fluid milk products and foods made from milk that contain calcium, like yogurt and cheese are part of the Dairy Group. (Foods that have little calcium, such as cream,  butter, and cream cheese, are not part of the group.) Most dairy choices should be low-fat or fat-free.    Oils: These are fats that are liquid at room temperature. They include canola, corn, olive, soybean, and sunflower oil. Foods that are mainly oil include mayonnaise, certain salad dressings, and soft margarines. You should have only 5 to 7 teaspoons of oils a day. You probably already get this much from the food you eat.  Use [employer] to Help Build Your Meals  The [employer] can help you plan and track your meals and activity. You can look up individual foods to see or compare their nutritional value. You can get guidelines for what and how much you should eat. You can compare your food choices. And you can assess personal physical activities and see ways you can improve. Go to www.C[employer].gov/iPrintcker/.    1956-2585 [employer]. [address] [place] [address]. All rights reserved. This information is not intended as a substitute for professional medical care. Always follow your healthcare professional's instructions.             Advance Directive  Patients advance directive was discussed and I am comfortable with the patients wishes.  Patient Education   Personalized Prevention Plan  You are due for the preventive services outlined below.  Your care team is available to assist you in scheduling these services.  If you have already completed any of these items, please share that information with your care team to update in your medical record.  Health Maintenance   Topic Date Due   ? MEDICARE ANNUAL WELLNESS VISIT  03/28/2003   ? FALL RISK ASSESSMENT  04/19/2020   ? INFLUENZA VACCINE RULE BASED (1) 08/01/2020   ? DXA SCAN  11/07/2021   ? ADVANCE CARE PLANNING  04/19/2024   ? TD 18+ HE  01/08/2026   ? PNEUMOCOCCAL IMMUNIZATION 65+ LOW/MEDIUM RISK  Completed   ? ZOSTER VACCINES  Completed

## 2021-06-18 NOTE — LETTER
Letter by Loli Alberts MD at      Author: Loli Alberts MD Service: -- Author Type: --    Filed:  Encounter Date: 2/2/2019 Status: (Other)       Elvira BRAD Eleazar  [address]  [facility] [address]             February 2, 2019         Dear Ms. Bush,    Below are the results from your recent visit:    Resulted Orders   Basic Metabolic Panel   Result Value Ref Range    Sodium 140 136 - 145 mmol/L    Potassium 3.4 (L) 3.5 - 5.0 mmol/L    Chloride 102 98 - 107 mmol/L    CO2 28 22 - 31 mmol/L    Anion Gap, Calculation 10 5 - 18 mmol/L    Glucose 102 70 - 125 mg/dL    Calcium 9.6 8.5 - 10.5 mg/dL    BUN 22 8 - 28 mg/dL    Creatinine 0.90 0.60 - 1.10 mg/dL    GFR MDRD Af Amer >60 >60 mL/min/1.73m2    GFR MDRD Non Af Amer 60 (L) >60 mL/min/1.73m2    Narrative    Fasting Glucose reference range is 70-99 mg/dL per  American Diabetes Association (ADA) guidelines.   HM2(CBC w/o Differential)   Result Value Ref Range    WBC 5.9 4.0 - 11.0 thou/uL    RBC 4.23 3.80 - 5.40 mill/uL    Hemoglobin 12.7 12.0 - 16.0 g/dL    Hematocrit 37.9 35.0 - 47.0 %    MCV 89 80 - 100 fL    MCH 30.0 27.0 - 34.0 pg    MCHC 33.5 32.0 - 36.0 g/dL    RDW 12.5 11.0 - 14.5 %    Platelets 174 140 - 440 thou/uL    MPV 8.5 7.0 - 10.0 fL         Elvira,    Because of your diuretic use, your potassium level is a bit low.  I hope you like bananas because I am recommending you eat 2-3 bananas a week.  There are other high potassium foods as well which you can find out on the Internet.  If you are doing this and continue to have low potassium, then we will need to supplement with a prescription.  For now I prefer not to do that unless you want to.  Overall your lab results look good.  See you in 6 months or sooner if needed.    Please call with questions or contact us using [employer].    Sincerely,        Electronically signed by Loli Alberts MD (Betsy)

## 2021-06-19 NOTE — LETTER
Letter by Loli Alberts MD at      Author: Loli Alberts MD Service: -- Author Type: --    Filed:  Encounter Date: 4/22/2019 Status: (Other)         Elvira Bush  [address]  [facility] [address]             April 22, 2019         Dear Ms. Bush,    Below are the results from your recent visit:    Resulted Orders   Lipid Cascade   Result Value Ref Range    Cholesterol 167 <=199 mg/dL    Triglycerides 103 <=149 mg/dL    HDL Cholesterol 57 >=50 mg/dL    LDL Calculated 89 <=129 mg/dL    Patient Fasting > 8hrs? Unknown    Vitamin D, Total (25-Hydroxy)   Result Value Ref Range    Vitamin D, Total (25-Hydroxy) 53.0 30.0 - 80.0 ng/mL    Narrative    Deficiency <10.0 ng/mL  Insufficiency 10.0-29.9 ng/mL  Sufficiency 30.0-80.0 ng/mL  Toxicity (possible) >100.0 ng/mL   Levetiracetam [Keppra ]   Result Value Ref Range    Levetiracetam 50.4 (H) 6.0 - 46.0 ug/mL    Narrative    Suggested Trough Concentrations: 6.0 - 46.0 ug/mL   Comprehensive Metabolic Panel   Result Value Ref Range    Sodium 144 136 - 145 mmol/L    Potassium 3.9 3.5 - 5.0 mmol/L    Chloride 106 98 - 107 mmol/L    CO2 26 22 - 31 mmol/L    Anion Gap, Calculation 12 5 - 18 mmol/L    Glucose 101 70 - 125 mg/dL    BUN 23 8 - 28 mg/dL    Creatinine 0.88 0.60 - 1.10 mg/dL    GFR MDRD Af Amer >60 >60 mL/min/1.73m2    GFR MDRD Non Af Amer >60 >60 mL/min/1.73m2    Bilirubin, Total 0.4 0.0 - 1.0 mg/dL    Calcium 9.9 8.5 - 10.5 mg/dL    Protein, Total 6.8 6.0 - 8.0 g/dL    Albumin 3.9 3.5 - 5.0 g/dL    Alkaline Phosphatase 62 45 - 120 U/L    AST 25 0 - 40 U/L    ALT 24 0 - 45 U/L    Narrative    Fasting Glucose reference range is 70-99 mg/dL per  American Diabetes Association (ADA) guidelines.   HM2(CBC w/o Differential)   Result Value Ref Range    WBC 5.3 4.0 - 11.0 thou/uL    RBC 4.25 3.80 - 5.40 mill/uL    Hemoglobin 12.8 12.0 - 16.0 g/dL    Hematocrit 39.0 35.0 - 47.0 %    MCV 92 80 - 100 fL    MCH 30.2 27.0 - 34.0 pg    MCHC 32.9  32.0 - 36.0 g/dL    RDW 12.4 11.0 - 14.5 %    Platelets 160 140 - 440 thou/uL    MPV 7.9 7.0 - 10.0 fL       These are very nice results.  Your Keppra level is just above the normal range, but I think your neurologist wants you there.  I will fax these results to him.    Please call with questions or contact us using [employer].    Sincerely,        Electronically signed by Loli Alberts MD (Betsy)

## 2021-06-20 NOTE — LETTER
Letter by Loli Alberts MD at      Author: Loli Alberts MD Service: -- Author Type: --    Filed:  Encounter Date: 8/3/2020 Status: (Other)         Elvira Bush  [address]  [facility] [address]             August 3, 2020         Dear Ms. Bush,    Below are the results from your recent visit:    Resulted Orders   Levetiracetam [Keppra ]   Result Value Ref Range    Levetiracetam 37.8 6.0 - 46.0 ug/mL    Narrative    Suggested Trough Concentrations: 6.0 - 46.0 ug/mL       Your Keppra level seems to be in a normal range currently.  We will fax this to your neurologist.    Please call with questions or contact us using [employer].    Sincerely,        Electronically signed by Loli Alberts MD (Betsy)

## 2021-06-20 NOTE — LETTER
Letter by Loli Alberts MD at      Author: Loli Alberts MD Service: -- Author Type: --    Filed:  Encounter Date: 12/22/2019 Status: Signed         Elvira SALINAS Eleazar  [address]  [facility] [address]         December 22, 2019     Dear Ms. Bush,    Below are the results from your recent visit:    Resulted Orders   Lipid [place]   Result Value Ref Range    Cholesterol 153 <=199 mg/dL    Triglycerides 69 <=149 mg/dL    HDL Cholesterol 49 (L) >=50 mg/dL    LDL Calculated 90 <=129 mg/dL    Patient Fasting > 8hrs? Yes    Basic Metabolic Panel   Result Value Ref Range    Sodium 144 136 - 145 mmol/L    Potassium 4.4 3.5 - 5.0 mmol/L    Chloride 107 98 - 107 mmol/L    CO2 26 22 - 31 mmol/L    Anion Gap, Calculation 11 5 - 18 mmol/L    Glucose 91 70 - 125 mg/dL    Calcium 9.7 8.5 - 10.5 mg/dL    BUN 23 8 - 28 mg/dL    Creatinine 0.97 0.60 - 1.10 mg/dL    GFR MDRD Af Amer >60 >60 mL/min/1.73m2    GFR MDRD Non Af Amer 55 (L) >60 mL/min/1.73m2    Narrative    Fasting Glucose reference range is 70-99 mg/dL per  American Diabetes Association (ADA) guidelines.       Nice lab results.  Happy Holidays!    Please call with questions or contact us using [employer].  Sincerely,      Electronically signed by Loli Alberts MD (Betsy)

## 2021-06-20 NOTE — LETTER
Letter by Nabor Masters CNP at      Author: Nabor Masters CNP Service: -- Author Type: --    Filed:  Encounter Date: 3/19/2020 Status: (Other)                   Office Hours: Monday - Friday 8:00 - 4:30PM    Elvira Bush  [address]  [facility] [address]           March 19, 2020      Dear Elvira:    It looks as though you are due to see Nabor Masters CNP in May or June. If you would like to schedule please call [phone]       Sincerely,        [facility] [place] [facility]

## 2021-06-20 NOTE — LETTER
Letter by Loli Alberts MD at      Author: Loli Alberts MD Service: -- Author Type: --    Filed:  Encounter Date: 7/9/2020 Status: (Other)         Elvira J Eleazar  [address]  [facility] [address]             July 9, 2020         Dear Ms. Bush,    Below are the results from your recent visit:    Resulted Orders   Lipid Cascade   Result Value Ref Range    Cholesterol 129 <=199 mg/dL    Triglycerides 62 <=149 mg/dL    HDL Cholesterol 43 (L) >=50 mg/dL    LDL Calculated 74 <=129 mg/dL    Patient Fasting > 8hrs? Yes    Basic Metabolic Panel   Result Value Ref Range    Sodium 140 136 - 145 mmol/L    Potassium 3.9 3.5 - 5.0 mmol/L    Chloride 102 98 - 107 mmol/L    CO2 21 (L) 22 - 31 mmol/L    Anion Gap, Calculation 17 5 - 18 mmol/L    Glucose 108 70 - 125 mg/dL    Calcium 9.6 8.5 - 10.5 mg/dL    BUN 18 8 - 28 mg/dL    Creatinine 0.83 0.60 - 1.10 mg/dL    GFR MDRD Af Amer >60 >60 mL/min/1.73m2    GFR MDRD Non Af Amer >60 >60 mL/min/1.73m2    Narrative    Fasting Glucose reference range is 70-99 mg/dL per  American Diabetes Association (ADA) guidelines.   Hepatic Profile   Result Value Ref Range    Bilirubin, Total 5.8 (H) 0.0 - 1.0 mg/dL    Bilirubin, Direct 3.8 (H) <=0.5 mg/dL    Protein, Total 6.9 6.0 - 8.0 g/dL    Albumin 3.2 (L) 3.5 - 5.0 g/dL    Alkaline Phosphatase 767 (H) 45 - 120 U/L     (H) 0 - 40 U/L     (H) 0 - 45 U/L   Hemoglobin   Result Value Ref Range    Hemoglobin 13.7 12.0 - 16.0 g/dL       Your lab results look good except for your very high liver enzymes.  We discussed this already.  I will see you on 7/14 for recheck of this lab work.    Please call with questions or contact us using "[employer]t.    Sincerely,        Electronically signed by Loli Alberts MD (Betsy)

## 2021-06-20 NOTE — LETTER
Letter by Loli Alberts MD at      Author: Loli Alberts MD Service: -- Author Type: --    Filed:  Encounter Date: 7/14/2020 Status: (Other)         Elvira Bush  [address]  [facility] [address]             July 14, 2020         Dear Ms. Bush,    Below are the results from your recent visit:    Resulted Orders   Levetiracetam [Keppra ]   Result Value Ref Range    Levetiracetam 51.6 (H) 6.0 - 46.0 ug/mL    Narrative    Suggested Trough Concentrations: 6.0 - 46.0 ug/mL   Hepatic Profile   Result Value Ref Range    Bilirubin, Total 1.7 (H) 0.0 - 1.0 mg/dL    Bilirubin, Direct 1.0 (H) <=0.5 mg/dL    Protein, Total 6.6 6.0 - 8.0 g/dL    Albumin 2.9 (L) 3.5 - 5.0 g/dL    Alkaline Phosphatase 647 (H) 45 - 120 U/L    AST 65 (H) 0 - 40 U/L    ALT 94 (H) 0 - 45 U/L       Hi Pat,    Happy to see that these labs are all moving in the right direction.  Recheck them in 4 to 6 weeks.      Please call with questions or contact us using [employer].    Sincerely,    Electronically signed by Loli Alberts MD (Betsy)

## 2021-06-21 ENCOUNTER — COMMUNICATION - HEALTHEAST (OUTPATIENT)
Dept: SCHEDULING | Facility: CLINIC | Age: 81
End: 2021-06-21

## 2021-06-21 NOTE — LETTER
Letter by Nabor Masters CNP at      Author: Nabor Masters CNP Service: -- Author Type: --    Filed:  Encounter Date: 4/22/2021 Status: (Other)                   Office Hours: Monday - Friday 8:00 - 4:30PM    Elvira Bush  [address]  [facility] [address]           April 22, 2021      Dear Elvira:    It looks as though you are due to see Nabor Masters CNP in July. If you would like to schedule this please call [phone]         Sincerely,      [facility]

## 2021-06-21 NOTE — PROGRESS NOTES
Pt presented to clinic for labs, provider visit and 6 month follow up for breast cancer. Elvira Duran RN

## 2021-06-21 NOTE — LETTER
Letter by Loli Alberts MD at      Author: Loli Alberts MD Service: -- Author Type: --    Filed:  Encounter Date: 10/23/2020 Status: (Other)         Elvira J Eleazar  [address]  [facility] [address]             October 23, 2020         Dear Ms. Bush,    Below are the results from your recent visit:    Resulted Orders   Comprehensive Metabolic Panel   Result Value Ref Range    Sodium 143 136 - 145 mmol/L    Potassium 3.9 3.5 - 5.0 mmol/L    Chloride 106 98 - 107 mmol/L    CO2 26 22 - 31 mmol/L    Anion Gap, Calculation 11 5 - 18 mmol/L    Glucose 88 70 - 125 mg/dL    BUN 18 8 - 28 mg/dL    Creatinine 0.94 0.60 - 1.10 mg/dL    GFR MDRD Af Amer >60 >60 mL/min/1.73m2    GFR MDRD Non Af Amer 57 (L) >60 mL/min/1.73m2    Bilirubin, Total 0.6 0.0 - 1.0 mg/dL    Calcium 9.1 8.5 - 10.5 mg/dL    Protein, Total 6.6 6.0 - 8.0 g/dL    Albumin 3.7 3.5 - 5.0 g/dL    Alkaline Phosphatase 152 (H) 45 - 120 U/L    AST 26 0 - 40 U/L    ALT 19 0 - 45 U/L    Narrative    Fasting Glucose reference range is 70-99 mg/dL per  American Diabetes Association (ADA) guidelines.   Lipid [place] FASTING   Result Value Ref Range    Cholesterol 153 <=199 mg/dL    Triglycerides 72 <=149 mg/dL    HDL Cholesterol 52 >=50 mg/dL    LDL Calculated 87 <=129 mg/dL    Patient Fasting > 8hrs? No       Comment:      had a coffee drink       Hi Pat,      These are nice results.  Make sure to come in to get your blood pressure rechecked after you have been taking the new prescription of lisinopril along with your usual blood pressure meds.      Please call with questions or contact us using [employer].    Sincerely,        Electronically signed by Carlos) RASTA Alberts MD

## 2021-06-21 NOTE — PROGRESS NOTES
[facility] Hematology and Oncology Progress Note    Patient: Elvira Bush  MRN: [id number]  Date of Service: 11/2/2018       Reason for Visit:    Scheduled 6-month follow up    Assessment and Plan:    1 Breast cancer of LOQ (L) female breast     T1c, NX, cM0   Triple negative    78 y.o.     2014, March - abnormal mammogram    03/24/14 - lumpectomy:    Grade 3, 1.1 cm invasive ductal carcinoma,    ER negative, NV barely positive, HER-2/lolita negative    Negative margins.      No lymph node in the biopsy sample.       Lymphovascular invasion.      Patient opted against chemotherapy.      07/14/14 - completed 6040 cGy breast conservation EBRT.    Every 6 month follow up since.    07/26 - 08/02/17 hospitalized with symptoms concerning for a stroke with slurred speech, left hand and left foot weakness.  CTA showed encephalomalacia involving the right temporal lobe likely due to chronic infarct.  There was also likely a subacute infarct in the left basal ganglia. CTA did not show any high grade stenosis or aneurysm.  A partially calcified likely meningioma along right aspect of anterio rfalx was also noted.  MRI was also negative for any acute ischemia.  In the ED she became unresponsive and started seizing which lasted for about 1 minute.  She was loaded with keppra and then started on maintenance dose.  She became unresponsive following the seizure and was intubated and admitted to the ICU.  She was extubated on 7/29/2017.  The patient was found to have a UTI and her culture grew klebsiella, treated with IV rocephin, switched to oral cefdinir at discharge.   She was diagnosed with atrial fibrillation on 6/20/17 during a pre op physical.  She was asymptomatic.  ECHO showed her LVEF @ 55%.  Once she was stabilized she was started on coumadin with an INR goal of 2-2.5.  CHADS2 vasc score is at least 2 given age and HTN and with the old stroke seen on MRI is likely higher.     09/09 - 09/12/17 hospitalized with a left  sacral and pubic rami fracture, displaced L5 transverse process fracture and mechanical fall after tripping off a curb.  Orthopedics declared that she was not a surgical candidate.  She was discharged to [facility] for physical therapy and subsequently discharged from there on October 4, 2017.  During her TCU stay her strength and stability improved to the point that she was able to ambulate with a walker and after PT transitioned to a cane.  She was taking Tylenol, 2-500 mg tabs twice daily during that time.    11/02/17 (B) screening mammogram - benign findings.    11/02/17 DXA scan - The spine bone density L1-L3 with T-score -1.3, stable compared to 2013.  Femoral bone densities show left total hip T- score -1.1 and right femoral neck T-score -1.3 and significant decline of 4.7% on the left hip and 5.6% on the right hip compared to 2013.  Trabecular bone score indicates good trabecular bone architecture.Appropriate evaluation and treatment recommended with follow up bone density scan in 1 year.    11/02/18:    HEME2 - WNL    BMP - GFR a little low.  Otherwise WNL.    The patient presents in good spirits, looking and feeling quite good.  She is very active, attending an exercise activity program generally 6 days/week with every other day swimming.  She is very proud that she has stopped all Tylenol and ibuprofen, found she really didn't need to take them and her LFTs have returned to normal.     Clinically [facility].      Continue calcium 2892-6767 mg and vitamin D 6613-7609 iu daily supplements.    Continue bone strengthening activities.    Continue self breast exams.    Yearly mammogram next week.    She asked that I add on upcoming lab work for PCP appointment - BMP changed to CMP and added Keppra level.    05/02/19 - 6 month follow up without labs.    If all looks well, will likely go to yearly evaluations with bilateral mammogram.      2) Atrial fib:    INR 2.37.    Coumadin dosing and monitoring through  PCP.    ECOG Performance:     ECOG Performance Status: 0    Distress Assessment:    Distress Assessment Score: 2        TT > 25 minutes face to face with > 50% counseling and care coordination.    Nabor Masters, CNP     CC: Loli Alberts MD (Betsy)  _____________________________________________    Interim History:    The patient presents looking and feeling well in excellent spirits.  She partakes of intentional exercise programs 6 days/week with swimming every other day.  She denies pain, cough, fever, chills, shortness of breath, unusual headaches, visual or mentation disturbance, bowel or bladder issues.  Her appetite is good.  She is yet  continuing to intentionally lose weight slowly through diet and exercise, about 40 pounds in the past 4 years, 15 pounds in the last year.  She intends to hold at her current weight.  She performs self breast exams in the shower and has not noted any concerning changes.       Pain Status:    Currently in Pain: No/denies      Review of Systems:    Constitutional  Constitutional (WDL): All constitutional elements are within defined limits  Neurosensory  Neurosensory (WDL): All neurosensory elements are within defined limits  Cardiovascular  Cardiovascular (WDL): All cardiovascular elements are within defined limits  Pulmonary  Respiratory (WDL): Within Defined Limits  Gastrointestinal  Gastrointestinal (WDL): All gastrointestinal elements are within defined limits  Genitourinary  Genitourinary (WDL): All genitourinary elements are within defined limits  Integumentary  Integumentary (WDL): All integumentary elements are within defined limits  Patient Coping  Patient Coping: Accepting;Open/discussion  Distress Assessment  Distress Assessment Score: 2  Accompanied by  Accompanied by: Alone    Past Histories:    Past Medical History:   Diagnosis Date     Atrial fibrillation (H)      Breast cancer (H) 3-2014     GERD (gastroesophageal reflux disease)      HTN (hypertension)       Hx of radiation therapy 5-2014    Left Breast     Hypercholesterolemia      Osteopenia      Skin cancer          Past Surgical History:   Procedure Laterality Date     BREAST LUMPECTOMY Left 3-2014     KNEE ARTHROSCOPY Right 2009     [facility]  7/27/2017          SKIN CANCER EXCISION       TENDON RELEASE         Physical Exam:    Recent Vitals 11/2/2018   Height -   Weight 124 lbs 3 oz   BSA (m2) -   /101   Pulse 82   Temp 97.8   Temp src 1   SpO2 98   Some recent data might be hidden     General: Alert and oriented.  No distress.  Very pleasant.  HEENT: Anicteric sclera.  EOMI.  JIA.  No mucosal lesions.     Chest:  Lungs clear.    Cardiac: S1, S2 heard.  Regular rate and rhythm.  She has soft systolic murmur.    Abdomen: Soft.  Not tender.  Not distend.  No palpable hepatosplenomegaly, masses or ascites.    Extremities:   No edema, cyanosis or clubbing.    Lymphatics:  No palpable lymphadenopathy in the neck, supraclavicular fossa or the armpit.        Breasts:  (R) - no palpable lumps or bumps.  Nipple appears normal.  No skin changes.  Unremarkable axilla.    (L) - no palpable lumps or bumps.  Well healed scar in the UOQ.  Nipple appears normal.  No skin changes.  Unremarkable axilla.    Patient declined offer of female  during exam.    Lab Results:    Reviewed with patient.    Recent Results (from the past 24 hour(s))   Basic Metabolic Panel   Result Value Ref Range    Sodium 142 136 - 145 mmol/L    Potassium 3.6 3.5 - 5.0 mmol/L    Chloride 104 98 - 107 mmol/L    CO2 27 22 - 31 mmol/L    Anion Gap, Calculation 11 5 - 18 mmol/L    Glucose 99 70 - 125 mg/dL    Calcium 10.2 8.5 - 10.5 mg/dL    BUN 24 8 - 28 mg/dL    Creatinine 0.99 0.60 - 1.10 mg/dL    GFR MDRD Af Amer >60 >60 mL/min/1.73m2    GFR MDRD Non Af Amer 54 (L) >60 mL/min/1.73m2   HM2 (CBC W/O DIFF)   Result Value Ref Range    WBC 5.5 4.0 - 11.0 thou/uL    RBC 4.49 3.80 - 5.40 mill/uL    Hemoglobin 13.4 12.0 - 16.0 g/dL    Hematocrit 40.4  35.0 - 47.0 %    MCV 90 80 - 100 fL    MCH 29.8 27.0 - 34.0 pg    MCHC 33.2 32.0 - 36.0 g/dL    RDW 13.2 11.0 - 14.5 %    Platelets 172 140 - 440 thou/uL    MPV 10.0 8.5 - 12.5 fL   INR   Result Value Ref Range    INR 2.37 (H) 0.90 - 1.10       Imaging:    No results found.

## 2021-06-21 NOTE — PROGRESS NOTES
"    SUBJECTIVE: Elvira Bush is a 78 y.o. White or  female who presents today for her 6-month med check.  I last saw her in April.  Since then she is seen Dr. Tomas for her seizure disorder she had her Keppra level done that was good and she sees him in a year.  She went to the dermatologist she went to [facility] on the second of this month and she sees them every 6 months for her breast cancer.  They did lab work and or very comprehensive with it so that all of my labs were done as well.  She had her mammogram this morning and is waiting for results.  She had her DEXA scan a year ago and does not need that till next year her atrial fibrillation is rate controlled and she is anticoagulated on warfarin.  Her last INR on her lab draw of 11/2 was good.  She already had her flu shot.  She is doing great.  She is very active she has swimming fitness classes and eats well and maintains a nice weight.    OBJECTIVE: /62 (Patient Site: Left Arm, Patient Position: Sitting, Cuff Size: Adult Regular)  Pulse 68  Ht 5' 5\" (1.651 m)  Wt 124 lb 1.6 oz (56.3 kg)  SpO2 97%  BMI 20.65 kg/m2  General: Healthy and fit appearing octogenarian in no acute distress, looking younger than age stated  Heart: Irregular rhythm, rate good  Lungs: Clear bilaterally  Abdomen: Soft, nontender  Extremities: Warm, dry and without edema    Recent Results (from the past 240 hour(s))   Basic Metabolic Panel   Result Value Ref Range    Sodium 142 136 - 145 mmol/L    Potassium 3.6 3.5 - 5.0 mmol/L    Chloride 104 98 - 107 mmol/L    CO2 27 22 - 31 mmol/L    Anion Gap, Calculation 11 5 - 18 mmol/L    Glucose 99 70 - 125 mg/dL    Calcium 10.2 8.5 - 10.5 mg/dL    BUN 24 8 - 28 mg/dL    Creatinine 0.99 0.60 - 1.10 mg/dL    GFR MDRD Af Amer >60 >60 mL/min/1.73m2    GFR MDRD Non Af Amer 54 (L) >60 mL/min/1.73m2   HM2 (CBC W/O DIFF)   Result Value Ref Range    WBC 5.5 4.0 - 11.0 thou/uL    RBC 4.49 3.80 - 5.40 mill/uL    Hemoglobin 13.4 " 12.0 - 16.0 g/dL    Hematocrit 40.4 35.0 - 47.0 %    MCV 90 80 - 100 fL    MCH 29.8 27.0 - 34.0 pg    MCHC 33.2 32.0 - 36.0 g/dL    RDW 13.2 11.0 - 14.5 %    Platelets 172 140 - 440 thou/uL    MPV 10.0 8.5 - 12.5 fL   INR   Result Value Ref Range    INR 2.37 (H) 0.90 - 1.10   Comprehensive Metabolic Panel   Result Value Ref Range    Sodium 142 136 - 145 mmol/L    Potassium 3.6 3.5 - 5.0 mmol/L    Chloride 104 98 - 107 mmol/L    CO2 27 22 - 31 mmol/L    Anion Gap, Calculation 11 5 - 18 mmol/L    Glucose 99 70 - 125 mg/dL    BUN 24 8 - 28 mg/dL    Creatinine 0.99 0.60 - 1.10 mg/dL    GFR MDRD Af Amer >60 >60 mL/min/1.73m2    GFR MDRD Non Af Amer 54 (L) >60 mL/min/1.73m2    Bilirubin, Total 0.6 0.0 - 1.0 mg/dL    Calcium 10.2 8.5 - 10.5 mg/dL    Protein, Total 7.7 6.0 - 8.0 g/dL    Albumin 4.2 3.5 - 5.0 g/dL    Alkaline Phosphatase 64 45 - 120 U/L    AST 25 0 - 40 U/L    ALT 21 0 - 45 U/L   Levetiracetam [Keppra ]   Result Value Ref Range    Levetiracetam 36.1 6.0 - 46.0 ug/mL         ASSESSMENT & PLAN:    1. Chronic atrial fibrillation (H)     2. Epilepsy with partial complex seizures, without status epilepticus (H)  levETIRAcetam (KEPPRA) 500 MG tablet   3. Essential hypertension     4. Essential Hypercholesterolemia     5. Osteopenia     6. Malignant neoplasm of lower-outer quadrant of left breast of female, estrogen receptor negative (H)       I will refill her Keppra as it is the only medication that needs refilling at this point.  All her chronic issues are well controlled at this time and being monitored appropriately.  Lab results looked excellent.  She can follow-up in 6 months time.    Patient Active Problem List   Diagnosis     Osteopenia     Esophageal reflux     Breast cancer of lower-outer quadrant of left female breast (H)     Essential Hypercholesterolemia     Essential hypertension     Obesity     Headache     Elevated LFTs     Meningioma (H)     Chronic atrial fibrillation (H)     Acute respiratory  failure, unspecified whether with hypoxia or hypercapnia (H)     Convulsions, unspecified convulsion type (H)     Increased tracheal secretions     Acute cystitis without hematuria     Pulmonary congestion     Encephalopathy     Transient alteration of awareness     Epilepsy with partial complex seizures, without status epilepticus (H)     Chronic a-fib (H)     Pelvic fracture (H)     Closed fracture of spinous process of lumbar vertebra (H)     Intraabdominal hemorrhage     Closed displaced fracture of left pubis, initial encounter (H)       Current Outpatient Prescriptions on File Prior to Visit   Medication Sig Dispense Refill     aspirin 81 MG tablet Take 81 mg by mouth daily.       atenolol (TENORMIN) 100 MG tablet Take 1 tablet (100 mg total) by mouth daily. 90 tablet 0     atorvastatin (LIPITOR) 20 MG tablet TAKE 1 TABLET (20 MG TOTAL) BY MOUTH AT BEDTIME. 90 tablet 1     calcium-vitamin D (CALCIUM-VITAMIN D) 500 mg(1,250mg) -200 unit per tablet Take 1 tablet by mouth 2 (two) times a day with meals.        hydroCHLOROthiazide (HYDRODIURIL) 25 MG tablet TAKE 1 TABLET (25 MG TOTAL) BY MOUTH DAILY. 90 tablet 1     magnesium oxide (MAG-OX) 400 mg tablet Take 1 tablet (400 mg total) by mouth daily.  0     multivitamin (MULTIVITAMIN) per tablet Take 1 tablet by mouth daily.       omeprazole (PRILOSEC) 20 MG capsule TAKE 1 CAPSULE (20 MG TOTAL) BY MOUTH DAILY. 90 capsule 1     warfarin (COUMADIN/JANTOVEN) 5 MG tablet Take one tablet (5 mg) by mouth daily or as instructed. Adjust dose based on INR results as directed. 90 tablet 1     [DISCONTINUED] levETIRAcetam (KEPPRA) 500 MG tablet Take 1 tablet (500 mg total) by mouth 2 (two) times a day. 60 tablet 0     [DISCONTINUED] acetaminophen (TYLENOL) 500 MG tablet Take 2 tablets (1,000 mg total) by mouth 3 (three) times a day. For 10 day then as needed  0     No current facility-administered medications on file prior to visit.

## 2021-06-21 NOTE — LETTER
Letter by Loli Alberts MD at      Author: Loli Alberts MD Service: -- Author Type: --    Filed:  Encounter Date: 4/16/2021 Status: (Other)         Elvira J Eleazar  [address]  [facility] [address]             April 16, 2021         Dear Ms. Bush,    Below are the results from your recent visit:    Resulted Orders   Basic Metabolic Panel   Result Value Ref Range    Sodium 145 136 - 145 mmol/L    Potassium 4.1 3.5 - 5.0 mmol/L    Chloride 106 98 - 107 mmol/L    CO2 30 22 - 31 mmol/L    Anion Gap, Calculation 9 5 - 18 mmol/L    Glucose 92 70 - 125 mg/dL    Calcium 10.1 8.5 - 10.5 mg/dL    BUN 25 8 - 28 mg/dL    Creatinine 0.93 0.60 - 1.10 mg/dL    GFR MDRD Af Amer >60 >60 mL/min/1.73m2    GFR MDRD Non Af Amer 58 (L) >60 mL/min/1.73m2    Narrative    Fasting Glucose reference range is 70-99 mg/dL per  American Diabetes Association (ADA) guidelines.   Hemoglobin   Result Value Ref Range    Hemoglobin 13.4 12.0 - 16.0 g/dL   Vitamin D, Total (25-Hydroxy)   Result Value Ref Range    Vitamin D, Total (25-Hydroxy) 53.3 30.0 - 80.0 ng/mL    Narrative    Deficiency <10.0 ng/mL  Insufficiency 10.0-29.9 ng/mL  Sufficiency 30.0-80.0 ng/mL  Toxicity (possible) >100.0 ng/mL       Malu Mejía,    These are excellent lab results.  I thought you might like a copy of them.  You can show them to your children!   :)    Please call with questions or contact us using [employer].    Sincerely,        Electronically signed by Loli Alberts MD (Betsy)

## 2021-06-23 NOTE — PROGRESS NOTES
SUBJECTIVE: Elvira Bush is a 78 y.o. White or  female who presents today with her son for follow-up of an inpatient hospitalization at [facility] from 1/25 through 26.  I last saw her 11/5/18 and at that time she was doing well.  She has had a seizure diagnosed a year and a half ago in July 2017.  She has had falls with pelvic and lumbar fractures.  She was found to have osteopenia.  She seemed quite stable when I last saw her, however on the 25th she was feeling nauseated and very fatigued.  She was in the dining room with her friends at her residence and decided to go home because she just was not feeling well.  When her friend followed up with a phone call and did not get an answer she went to find her.  She found her on the bathroom floor in a confused state.  She was brought by ambulance to [facility].  She said at that time she was a bit confused but alert.  She was embarrassed to be on the ambulance.  I have limited ability to see what happened during her hospitalization but she did see a neurologist from the [facility] system there.  Her Keppra was increased from 500 mg twice daily to 750 mg twice daily.  They did a CTA of the head and neck which were normal.  They did a MRI of her head showing encephalomalacia and gliosis in the posterior right cerebral hemisphere.  She says she had noted having trouble with vision and she had a palsy of her left eye in that her pupil did not dilate correctly.  That eventually resolved.  She does tell me that when she has seen the eye doctor in the past he described her having a cataract and so she is supposed to be following up with her eye doctor soon.  She also was told to follow-up with her neurologist who is Dr. Tomas.  She however is not altogether enthusiastic in returning to see Dr. Tomas.  She really liked the neurologist she saw through the [facility] system.  We discussed perhaps she should follow-up with them.  She notes  that she would have to wait until March to see Dr. Tomas.  When they told her the date she thought perhaps it was in February, but indeed it had been set for March.  She does not want to wait that long.  We discussed getting [facility] an [facility].  We also went over her abnormal labs that need to be rechecked today.  During her hospitalization she had a potassium low at 3.3.  She also had a hemoglobin mildly low at 11.5 and platelets low at 140 6K.  We also discussed that she should get her INR rechecked as she has had a medication dose change.  Currently she says that she feels back to normal.  She thinks she is baseline.    OBJECTIVE: /72 (Patient Site: Left Arm, Patient Position: Sitting, Cuff Size: Adult Regular)   Pulse 64   Temp 97.3  F (36.3  C) (Oral)   Wt 118 lb 4.8 oz (53.7 kg)   SpO2 97%   Breastfeeding? No   BMI 19.69 kg/m    General: Relatively healthy-appearing normal weight octogenarian in no acute distress.  HEENT: Eyes are clear and it appears she has normal extraocular movement and ability to dilate.  Heart: Irregularly irregular  Lungs: Clear bilaterally  Abdomen: Soft, nontender  Extremities: Warm, dry and without edema  Psych: She is unable to give me a full, comprehensive story about her hospitalization.  Unfortunately her son is no help.  Neuro: She has a somewhat wide-based gait and her balance seems a bit off.  Otherwise I do not see any focal deficits.  She may be somewhat forgetful.    ASSESSMENT & PLAN:    1. Partial symptomatic epilepsy with complex partial seizures, not intractable, without status epilepticus (H)  Basic Metabolic Panel    HM2(CBC w/o Differential)   2. Encephalomalacia on imaging study     3. Chronic atrial fibrillation (H)  INR   4. Essential hypertension     5. Osteopenia       I spent time writing orders and faxing them to the neurology clinic at [facility]. It did not seem that the patient or the son  would be capable/able to advocate for themselves in order to find and get an appointment set up.  I made a few phone calls and got a fax number to fax my order to.  I will get back to her on the above-mentioned lab work by mail and only call with grossly abnormal values.  Coumadin nursing will get back to her on directions with Coumadin dosing.  She is to make her follow-up appointment with her ophthalmologist.  She will keep the future appointment with Dr. Tomas until we have a secured visit with the neurology clinic at [facility].  If all is well she can see me back in 6 months time. 40 minutes spent together with the patient today, more than 50% spent in counseling, discussing the above topics.        Patient Active Problem List   Diagnosis     Osteopenia     Esophageal reflux     Breast cancer of lower-outer quadrant of left female breast (H)     Essential Hypercholesterolemia     Essential hypertension     Obesity     Headache     Elevated LFTs     Meningioma (H)     Chronic atrial fibrillation (H)     Convulsions, unspecified convulsion type (H)     Epilepsy with partial complex seizures, without status epilepticus (H)     Encephalomalacia on imaging study       Current Outpatient Medications on File Prior to Visit   Medication Sig Dispense Refill     aspirin 81 MG tablet Take 81 mg by mouth daily.       atenolol (TENORMIN) 100 MG tablet TAKE 1 TABLET BY MOUTH EVERY DAY 90 tablet 2     atorvastatin (LIPITOR) 20 MG tablet TAKE 1 TABLET (20 MG TOTAL) BY MOUTH AT BEDTIME. 90 tablet 1     calcium-vitamin D (CALCIUM-VITAMIN D) 500 mg(1,250mg) -200 unit per tablet Take 1 tablet by mouth 2 (two) times a day with meals.        hydroCHLOROthiazide (HYDRODIURIL) 25 MG tablet TAKE 1 TABLET (25 MG TOTAL) BY MOUTH DAILY. 90 tablet 1     levETIRAcetam (KEPPRA) 750 MG tablet Take 750 mg by mouth 2 (two) times a day.       magnesium oxide (MAG-OX) 400 mg tablet Take 1 tablet (400 mg total) by mouth daily.  0      multivitamin (MULTIVITAMIN) per tablet Take 1 tablet by mouth daily.       omeprazole (PRILOSEC) 20 MG capsule TAKE 1 CAPSULE (20 MG TOTAL) BY MOUTH DAILY. 90 capsule 1     warfarin (COUMADIN/JANTOVEN) 5 MG tablet Take one tablet (5 mg) by mouth daily or as instructed. Adjust dose based on INR results as directed. 90 tablet 1     [DISCONTINUED] levETIRAcetam (KEPPRA) 500 MG tablet Take 1 tablet (500 mg total) by mouth 2 (two) times a day. 180 tablet 3     No current facility-administered medications on file prior to visit.

## 2021-06-23 NOTE — TELEPHONE ENCOUNTER
Refill Approved    Rx renewed per Medication Renewal Policy. Medication was last renewed on 9/21/18.    Maryan Mansfield, [facility] Triage/Med Refill 1/15/2019     Requested Prescriptions   Pending Prescriptions Disp Refills     atenolol (TENORMIN) 100 MG tablet [Pharmacy Med Name: ATENOLOL 100 MG TABLET] 90 tablet 0     Sig: TAKE 1 TABLET BY MOUTH EVERY DAY    Beta-Blockers Refill Protocol Passed - 1/15/2019  9:51 AM       Passed - PCP or prescribing provider visit in past 12 months or next 3 months    Last office visit with prescriber/PCP: 11/5/2018 Loli Alberts MD OR same dept: 11/5/2018 Loli Alberts MD OR same specialty: 11/5/2018 Loli Alberts MD  Last physical: Visit date not found Last MTM visit: Visit date not found   Next visit within 3 mo: Visit date not found  Next physical within 3 mo: Visit date not found  Prescriber OR PCP: Carlos) RASTA Alberts MD  Last diagnosis associated with med order: 1. Essential hypertension  - atenolol (TENORMIN) 100 MG tablet [Pharmacy Med Name: ATENOLOL 100 MG TABLET]; TAKE 1 TABLET BY MOUTH EVERY DAY  Dispense: 90 tablet; Refill: 0    If protocol passes may refill for 12 months if within 3 months of last provider visit (or a total of 15 months).            Passed - Blood pressure filed in past 12 months    BP Readings from Last 1 Encounters:   11/05/18 124/62

## 2021-06-23 NOTE — TELEPHONE ENCOUNTER
ANTICOAGULATION  MANAGEMENT    Assessment     Today's INR result of 1.90 is Subtherapeutic (goal INR of 2.0-3.0)        Warfarin taken as previously instructed    Increased greens/vitamin K intake may be affecting INR. Patient has been eating a salad every day of the week. We discussed decreasing the Vitamin K. Elvira did not want to eat a salad everyday going forward. She will try to eat a salad 3 times a week; and we will keep her Warfarin dose the same for now.    No new medication/supplements affecting INR    Continues to tolerate warfarin with no reported s/s of bleeding or thromboembolism     Previous INR was Supratherapeutic    Plan:     Spoke with Elvira regarding INR result and instructed:     Warfarin Dosing Instructions:  Continue current warfarin dose 5 mg daily  (0 % change)    Instructed patient to follow up no later than: 2 weeks    Education provided: importance of consistent vitamin K intake, impact of vitamin K foods on INR and vitamin K content of foods    Elvira verbalizes understanding and agrees to warfarin dosing plan.    Instructed to call the [facility] for any changes, questions or concerns. (#[phone])   ?   Brenna Frankel RN    Subjective/Objective:      Elvira Bush, a 78 y.o. female is on warfarin.     Elvira reports:     Home warfarin dose: as updated on anticoagulation calendar per template     Missed doses: No     Medication changes:  No     S/S of bleeding or thromboembolism:  No     New Injury or illness:  No     Changes in diet or alcohol consumption:  Yes: increased vitamin K in diet     Upcoming surgery, procedure or cardioversion:  No      Anticoagulation Episode Summary     Current INR goal:   2.0-3.0   TTR:   71.3 % (1.3 y)   Next INR check:   1/25/2019   INR from last check:   1.90! (1/11/2019)   Weekly max warfarin dose:      Target end date:   Indefinite   INR check location:      Preferred lab:      Send INR reminders to:   ANTICOAGULATION POOL C  (DTN,VAD,CGR,GAV)    Indications    Chronic a-fib (H) [I48.2]           Comments:            Anticoagulation Care Providers     Provider Role Specialty Phone number    Loli Alberts MD Referring Family Medicine [phone]

## 2021-06-23 NOTE — TELEPHONE ENCOUNTER
ANTICOAGULATION  MANAGEMENT    Assessment     Today's INR result of 2.5 is Therapeutic (goal INR of 2.0-3.0)    1/25 - 1/26  Admitted at [facility] for seizures      Warfarin taken as previously instructed    No new diet changes affecting INR     Keppra dose was increased from recent hospital admission    No interaction expected between Keppra and warfarin    Continues to tolerate warfarin with no reported s/s of bleeding or thromboembolism     Previous INR was Subtherapeutic     The patient reported that she will be going to see a new Neurologist and she might need to reschedule cataract surgery for now.    Plan:     Spoke with Elvira regarding INR result and instructed:     Warfarin Dosing Instructions:  Continue current warfarin dose    5 mg every day      (0 % change)    Instructed patient to follow up no later than: 2 weeks, appointment made.    Education provided: importance of therapeutic range and no interaction anticipated between warfarin and keppra    Elvira verbalizes understanding and agrees to warfarin dosing plan.    Instructed to call the [facility] for any changes, questions or concerns. (#[phone])   ?   Santa Shultz RN    Subjective/Objective:      Elvira Bush, a 78 y.o. female is on warfarin.     Elvira reports:     Home warfarin dose: as updated on anticoagulation calendar per template     Missed doses: No     Medication changes:  Yes: Keprra dose increased.     S/S of bleeding or thromboembolism:  No     New Injury or illness:  Yes: admitted at [facility] for seizures.     Changes in diet or alcohol consumption:  No     Upcoming surgery, procedure or cardioversion:  No    Anticoagulation Episode Summary     Current INR goal:   2.0-3.0   TTR:   71.8 % (1.4 y)   Next INR check:   2/15/2019   INR from last check:   2.50 (2/1/2019)   Weekly max warfarin dose:      Target end date:   Indefinite   INR check location:      Preferred lab:      Send INR reminders to:    ANTICOAGULATION POOL C (DTN,VAD,CGR,GAV)    Indications    Chronic a-fib (H) [I48.2]           Comments:            Anticoagulation Care Providers     Provider Role Specialty Phone number    Loli Alberts MD Referring [facility] Medicine [phone]

## 2021-06-24 NOTE — TELEPHONE ENCOUNTER
Refill Approved    Rx renewed per Medication Renewal Policy. Medication was last renewed on 9/4/18.    Maryan Mansfield, [facility] Triage/Med Refill 2/26/2019     Requested Prescriptions   Pending Prescriptions Disp Refills     atorvastatin (LIPITOR) 20 MG tablet [Pharmacy Med Name: ATORVASTATIN 20 MG TABLET] 90 tablet 1     Sig: TAKE 1 TABLET BY MOUTH EVERYDAY AT BEDTIME    Statins Refill Protocol (Hmg CoA Reductase Inhibitors) Passed - 2/26/2019  1:14 AM       Passed - PCP or prescribing provider visit in past 12 months     Last office visit with prescriber/PCP: 2/1/2019 Loli Alberts MD OR same dept: 2/1/2019 Loli Alberts MD OR same specialty: 2/1/2019 Loli Alberts MD  Last physical: Visit date not found Last MTM visit: Visit date not found   Next visit within 3 mo: Visit date not found  Next physical within 3 mo: Visit date not found  Prescriber OR PCP: Loli Alberts MD ([place])  Last diagnosis associated with med order: 1. Essential Hypercholesterolemia  - atorvastatin (LIPITOR) 20 MG tablet [Pharmacy Med Name: ATORVASTATIN 20 MG TABLET]; TAKE 1 TABLET BY MOUTH EVERYDAY AT BEDTIME  Dispense: 90 tablet; Refill: 1    2. Essential hypertension  - hydroCHLOROthiazide (HYDRODIURIL) 25 MG tablet [Pharmacy Med Name: HYDROCHLOROTHIAZIDE 25 MG TAB]; TAKE 1 TABLET BY MOUTH EVERY DAY  Dispense: 90 tablet; Refill: 1    3. Gastroesophageal reflux disease without esophagitis  - omeprazole (PRILOSEC) 20 MG capsule [Pharmacy Med Name: OMEPRAZOLE DR 20 MG CAPSULE]; TAKE 1 CAPSULE BY MOUTH EVERY DAY  Dispense: 90 capsule; Refill: 1    If protocol passes may refill for 12 months if within 3 months of last provider visit (or a total of 15 months).             hydroCHLOROthiazide (HYDRODIURIL) 25 MG tablet [Pharmacy Med Name: HYDROCHLOROTHIAZIDE 25 MG TAB] 90 tablet 1     Sig: TAKE 1 TABLET BY MOUTH EVERY DAY    Diuretics/Combination Diuretics Refill Protocol  Passed - 2/26/2019  1:14 AM        Passed - Visit with PCP or prescribing provider visit in past 12 months    Last office visit with prescriber/PCP: 2/1/2019 Loli Alberts MD OR same dept: 2/1/2019 Loli Alberts MD OR same specialty: 2/1/2019 Loli Alberts MD  Last physical: Visit date not found Last MTM visit: Visit date not found   Next visit within 3 mo: Visit date not found  Next physical within 3 mo: Visit date not found  Prescriber OR PCP: Carlos) RASTA Alberts MD  Last diagnosis associated with med order: 1. Essential Hypercholesterolemia  - atorvastatin (LIPITOR) 20 MG tablet [Pharmacy Med Name: ATORVASTATIN 20 MG TABLET]; TAKE 1 TABLET BY MOUTH EVERYDAY AT BEDTIME  Dispense: 90 tablet; Refill: 1    2. Essential hypertension  - hydroCHLOROthiazide (HYDRODIURIL) 25 MG tablet [Pharmacy Med Name: HYDROCHLOROTHIAZIDE 25 MG TAB]; TAKE 1 TABLET BY MOUTH EVERY DAY  Dispense: 90 tablet; Refill: 1    3. Gastroesophageal reflux disease without esophagitis  - omeprazole (PRILOSEC) 20 MG capsule [Pharmacy Med Name: OMEPRAZOLE DR 20 MG CAPSULE]; TAKE 1 CAPSULE BY MOUTH EVERY DAY  Dispense: 90 capsule; Refill: 1    If protocol passes may refill for 12 months if within 3 months of last provider visit (or a total of 15 months).            Passed - Serum Potassium in past 12 months     Lab Results   Component Value Date    Potassium 3.4 (L) 02/01/2019            Passed - Serum Sodium in past 12 months     Lab Results   Component Value Date    Sodium 140 02/01/2019            Passed - Blood pressure on file in past 12 months    BP Readings from Last 1 Encounters:   02/01/19 120/72            Passed - Serum Creatinine in past 12 months     Creatinine   Date Value Ref Range Status   02/01/2019 0.90 0.60 - 1.10 mg/dL Final             omeprazole (PRILOSEC) 20 MG capsule [Pharmacy Med Name: OMEPRAZOLE DR 20 MG CAPSULE] 90 capsule 1     Sig: TAKE 1 CAPSULE BY MOUTH EVERY DAY    GI Medications Refill Protocol Passed - 2/26/2019   1:14 AM       Passed - PCP or prescribing provider visit in last 12 or next 3 months.    Last office visit with prescriber/PCP: 2/1/2019 Loli Alberts MD OR same dept: 2/1/2019 Loli Alberts MD OR same specialty: 2/1/2019 Loli Alberts MD  Last physical: Visit date not found Last MTM visit: Visit date not found   Next visit within 3 mo: Visit date not found  Next physical within 3 mo: Visit date not found  Prescriber OR PCP: Carlos) RASTA Alberts MD  Last diagnosis associated with med order: 1. Essential Hypercholesterolemia  - atorvastatin (LIPITOR) 20 MG tablet [Pharmacy Med Name: ATORVASTATIN 20 MG TABLET]; TAKE 1 TABLET BY MOUTH EVERYDAY AT BEDTIME  Dispense: 90 tablet; Refill: 1    2. Essential hypertension  - hydroCHLOROthiazide (HYDRODIURIL) 25 MG tablet [Pharmacy Med Name: HYDROCHLOROTHIAZIDE 25 MG TAB]; TAKE 1 TABLET BY MOUTH EVERY DAY  Dispense: 90 tablet; Refill: 1    3. Gastroesophageal reflux disease without esophagitis  - omeprazole (PRILOSEC) 20 MG capsule [Pharmacy Med Name: OMEPRAZOLE DR 20 MG CAPSULE]; TAKE 1 CAPSULE BY MOUTH EVERY DAY  Dispense: 90 capsule; Refill: 1    If protocol passes may refill for 12 months if within 3 months of last provider visit (or a total of 15 months).

## 2021-06-24 NOTE — TELEPHONE ENCOUNTER
ANTICOAGULATION  MANAGEMENT    Assessment     Today's INR result of 2.10 is Therapeutic (goal INR of 2.0-3.0)        Warfarin taken as previously instructed    No new diet changes affecting INR    No new medication/supplements affecting INR    Continues to tolerate warfarin with no reported s/s of bleeding or thromboembolism     Previous INR was Therapeutic    Plan:     Spoke with Elvira regarding INR result and instructed:     Warfarin Dosing Instructions:  Continue current warfarin dose 5 mg daily.  (0 % change)    Instructed patient to follow up no later than: 4 weeks    Education provided: target INR goal and significance of current INR result    Elvira verbalizes understanding and agrees to warfarin dosing plan.    Instructed to call the [facility] for any changes, questions or concerns. (#[phone])   ?   Brenna Frankel RN    Subjective/Objective:      Elvira Bush, a 78 y.o. female is on warfarin.     Elvira reports:     Home warfarin dose: as updated on anticoagulation calendar per template     Missed doses: No     Medication changes:  No     S/S of bleeding or thromboembolism:  No     New Injury or illness:  No     Changes in diet or alcohol consumption:  No     Upcoming surgery, procedure or cardioversion:  No    Anticoagulation Episode Summary     Current INR goal:   2.0-3.0   TTR:   72.8 % (1.4 y)   Next INR check:   3/19/2019   INR from last check:   2.10 (2/19/2019)   Weekly max warfarin dose:      Target end date:   Indefinite   INR check location:      Preferred lab:      Send INR reminders to:   ANTICOAGULATION POOL C (DTN,VAD,CGR,GAV)       Comments:            Anticoagulation Care Providers     Provider Role Specialty Phone number    Loli Alberts MD Referring Family Medicine [phone]

## 2021-06-25 NOTE — TELEPHONE ENCOUNTER
ANTICOAGULATION  MANAGEMENT    Assessment     Today's INR result of 2.40 is Therapeutic (goal INR of 2.0-3.0)        Warfarin taken as previously instructed    No new diet changes affecting INR    No new medication/supplements affecting INR    Continues to tolerate warfarin with no reported s/s of bleeding or thromboembolism     Previous INR was Therapeutic    Plan:     Spoke with Elvira regarding INR result and instructed:     Warfarin Dosing Instructions:  Continue current warfarin dose 5 mg daily  (0 % change)    Instructed patient to follow up no later than: 4 weeks    Education provided: target INR goal and significance of current INR result and importance of notifying clinic for changes in medications    Elvira verbalizes understanding and agrees to warfarin dosing plan.    Instructed to call the [facility] for any changes, questions or concerns. (#[phone])   ?   Brenna Frankel RN    Subjective/Objective:      Elvira Bush, a 78 y.o. female is on warfarin.     Elvira reports:     Home warfarin dose: as updated on anticoagulation calendar per template     Missed doses: No     Medication changes:  No     S/S of bleeding or thromboembolism:  No     New Injury or illness:  No     Changes in diet or alcohol consumption:  No     Upcoming surgery, procedure or cardioversion:  No    Anticoagulation Episode Summary     Current INR goal:   2.0-3.0   TTR:   74.1 % (1.5 y)   Next INR check:   4/16/2019   INR from last check:   2.40 (3/19/2019)   Weekly max warfarin dose:      Target end date:   Indefinite   INR check location:      Preferred lab:      Send INR reminders to:   ANTICOAGULATION POOL C (DTN,VAD,CGR,GAV)       Comments:            Anticoagulation Care Providers     Provider Role Specialty Phone number    Loli Alberts MD Referring Family Medicine [phone]

## 2021-06-25 NOTE — TELEPHONE ENCOUNTER
ANTICOAGULATION  MANAGEMENT    Assessment     Today's INR result of 2.5 is Therapeutic (goal INR of 2.0-3.0)        Warfarin taken as previously instructed    No new diet changes affecting INR    No new medication/supplements affecting INR    Continues to tolerate warfarin with no reported s/s of bleeding or thromboembolism     Previous INR was Therapeutic    Plan:     Spoke on phone with Pat regarding INR result and instructed:      Warfarin Dosing Instructions:  Continue current warfarin dose 5 mg daily  (0 % change)    Instructed patient to follow up no later than: 4 weeks, appointment made    Education provided: importance of therapeutic range and target INR goal and significance of current INR result    Pat verbalizes understanding and agrees to warfarin dosing plan.    Instructed to call the [facility] for any changes, questions or concerns. (#[phone])   ?   Santa Shultz RN    Subjective/Objective:      Elvira Bush, a 81 y.o. female is on warfarin. Pat      Pat reports:     Home warfarin dose: as updated on anticoagulation calendar per template     Missed doses: No     Medication changes:  No     S/S of bleeding or thromboembolism:  No     New Injury or illness:  No     Changes in diet or alcohol consumption:  No     Upcoming surgery, procedure or cardioversion:  No    Anticoagulation Episode Summary     Current INR goal:  2.0-3.0   TTR:  53.2 % (1 y)   Next INR check:  6/22/2021   INR from last check:  2.50 (5/25/2021)   Weekly max warfarin dose:     Target end date:  Indefinite   INR check location:     Preferred lab:     Send INR reminders to:  LAINE ENRIQUEZ       Comments:           Anticoagulation Care Providers     Provider Role Specialty Phone number    Loli Alberts MD Referring Family Medicine [phone]

## 2021-06-26 NOTE — TELEPHONE ENCOUNTER
Who is calling:  Patient   Reason for Call:  See below  Date of last appointment with primary care:   Okay to leave a detailed message: Yes    Patient had carpal tunnel surgery on 6/15 and had an INR. It was 2.6. Patient is scheduled for an INR tomorrow 6/22. Does she need to keep the appointment?    Please call and advise.

## 2021-06-26 NOTE — TELEPHONE ENCOUNTER
ANTICOAGULATION  MANAGEMENT    Assessment     INR result of 6/15/21 is Therapeutic (goal INR of 2.0-3.0)        Warfarin taken as previously instructed    No new diet changes affecting INR    No new medication/supplements affecting INR    Continues to tolerate warfarin with no reported s/s of bleeding or thromboembolism     Previous INR was Therapeutic     Patient had INR checked on 6/15/21 prior to carpel tunnel surgery.  Patient did not know they would be checking her INR therefore did not have results faxed    Plan:     Spoke on phone with Pat regarding INR result and instructed:      Warfarin Dosing Instructions:  Continue current warfarin dose 5 mg daily (0 % change)    Instructed patient to follow up no later than: 3 weeks    Education provided: importance of therapeutic range, target INR goal and significance of current INR result, importance of following up for INR monitoring at instructed interval, importance of taking warfarin as instructed and importance of notifying clinic of upcoming surgeries and procedures 2 weeks in advance    Pat verbalizes understanding and agrees to warfarin dosing plan.    Instructed to call the [facility] for any changes, questions or concerns. (#[phone])   ?   Brenna Sims RN    Subjective/Objective:      Elvira BRAD Bush, a 81 y.o. female is on warfarin. Pat      Pat reports:     Home warfarin dose: verbally confirmed home dose with Pat and updated on anticoagulation calendar     Missed doses: No     Medication changes:  No     S/S of bleeding or thromboembolism:  No     New Injury or illness:  No     Changes in diet or alcohol consumption:  No     Upcoming surgery, procedure or cardioversion:  No    Anticoagulation Episode Summary     Current INR goal:  2.0-3.0   TTR:  53.2 % (12 mo)   Next INR check:  7/6/2021   INR from last check:  2.60 (6/15/2021)   Weekly max warfarin dose:     Target end date:  Indefinite   INR check location:     Preferred lab:     Send  INR reminders to:  LAINE ENRIQUEZ       Comments:           Anticoagulation Care Providers     Provider Role Specialty Phone number    Loli Alberts MD Referring Family Medicine [phone]

## 2021-06-30 ENCOUNTER — RECORDS - HEALTHEAST (OUTPATIENT)
Dept: ADMINISTRATIVE | Facility: OTHER | Age: 81
End: 2021-06-30

## 2021-07-03 NOTE — ADDENDUM NOTE
Addendum Note by Gurpreet Haley MD at 6/20/2017 10:22 AM     Author: Gurpreet Haley MD Service: -- Author Type: Physician    Filed: 6/20/2017 10:22 AM Encounter Date: 6/20/2017 Status: Signed    : Gurpreet Haley MD (Physician)    Addended by: GURPREET HALEY on: 6/20/2017 10:22 AM        Modules accepted: Orders

## 2021-07-03 NOTE — ADDENDUM NOTE
Addendum Note by Loli Alberts MD at 8/24/2017  2:53 PM     Author: Loli Alberts MD Service: -- Author Type: Physician    Filed: 8/24/2017  2:53 PM Encounter Date: 8/23/2017 Status: Signed    : Loli Alberts MD (Physician)    Addended by: LOLI ALBERTS on: 8/24/2017 02:53 PM        Modules accepted: Orders

## 2021-07-04 NOTE — ADDENDUM NOTE
Addendum Note by Wendy Harper LPN at 3/23/2021  1:02 PM     Author: Wendy Harper LPN Service: -- Author Type: Licensed Nurse    Filed: 3/24/2021  7:46 AM Encounter Date: 3/23/2021 Status: Signed    : Wendy Harper LPN (Licensed Nurse)    Addended by: RAH HARPER on: 3/24/2021 07:46 AM        Modules accepted: Orders

## 2021-07-04 NOTE — LETTER
Letter by Nabor Masters CNP at      Author: Nabor Masters CNP Service: -- Author Type: --    Filed:  Encounter Date: 6/11/2021 Status: (Other)                   Office Hours: Monday - Friday 8:00 - 4:30PM    Elvira Bush  [id number]  [facility] [address]           June 11, 2021      Dear Elvira:    It looks as though you are due to see Nabor Masters CNP in July. If you would like to schedule this please call [phone]         Sincerely,        [facility]

## 2021-07-06 ENCOUNTER — COMMUNICATION - HEALTHEAST (OUTPATIENT)
Dept: ANTICOAGULATION | Facility: CLINIC | Age: 81
End: 2021-07-06

## 2021-07-06 ENCOUNTER — AMBULATORY - HEALTHEAST (OUTPATIENT)
Dept: LAB | Facility: CLINIC | Age: 81
End: 2021-07-06

## 2021-07-06 DIAGNOSIS — I48.20 CHRONIC ATRIAL FIBRILLATION (H): Primary | Chronic | ICD-10-CM

## 2021-07-06 DIAGNOSIS — I48.20 CHRONIC ATRIAL FIBRILLATION (H): ICD-10-CM

## 2021-07-06 LAB — INR PPP: 3.9 (ref 0.9–1.1)

## 2021-07-06 NOTE — TELEPHONE ENCOUNTER
Telephone Encounter by Santa Shultz RN at 7/6/2021  2:09 PM     Author: Santa Shultz RN Service: -- Author Type: Registered Nurse    Filed: 7/6/2021  2:17 PM Encounter Date: 7/6/2021 Status: Signed    : Santa Shultz RN (Registered Nurse)       ANTICOAGULATION MANAGEMENT     Elvira Bush 81 y.o., female is on warfarin with Supratherapeutic INR result (goal range 2.0-3.0)    Recent labs: (last 7 days)     07/06/21  1132   INR 3.90*       ASSESSMENT     Source: Patient/Caregiver Call and Template      Warfarin dosing taken: Warfarin taken as instructed    Diet: Change in alcohol intake may be affecting INR. had a half a glass of wine yesterday     Illness, Injury or hospitalization: No    Medication changes: None    Signs or symptoms of bleeding or clotting: No    Previous INR: therapeutic last 2(+) visits    Additional findings: None     PLAN     Recommended plan for temporary change(s) affecting INR:     Dosing instructions: hold warfarin dose today then continue your current warfarin dose 5 mg daily (0% change)    Follow up no later than: 2 weeks     Telephone call with Pat who verbalizes understanding and agrees to plan    Lab visit scheduled    Education provided: potential interaction between warfarin and alcohol, importance of therapeutic range, target INR goal and significance of current INR result, importance of following up for INR monitoring at instructed interval and monitoring for bleeding signs and symptoms    Plan made per [facility] anticoagulation protocol    Santa Shultz  Anticoagulation Clinic   [phone]    Anticoagulation Episode Summary     Current INR goal:  2.0-3.0   TTR:  54.1 % (1 y)   Next INR check:  7/20/2021   INR from last check:  3.90 (7/6/2021)   Weekly max warfarin dose:     Target end date:  Indefinite   INR check location:     Preferred lab:     Send INR reminders to:  LAINE ENRIQUEZ       Comments:           Anticoagulation Care Providers      Provider Role Specialty Phone number    Loli Alberts MD Referring Family Medicine [phone]

## 2021-07-13 ENCOUNTER — RECORDS - HEALTHEAST (OUTPATIENT)
Dept: ADMINISTRATIVE | Facility: CLINIC | Age: 81
End: 2021-07-13

## 2021-07-14 PROBLEM — I63.9 CVA (CEREBRAL VASCULAR ACCIDENT) (H): Status: RESOLVED | Noted: 2017-07-26 | Resolved: 2017-07-31

## 2021-07-20 ENCOUNTER — LAB (OUTPATIENT)
Dept: LAB | Facility: CLINIC | Age: 81
End: 2021-07-20
Payer: MEDICARE

## 2021-07-20 ENCOUNTER — ANTICOAGULATION THERAPY VISIT (OUTPATIENT)
Dept: ANTICOAGULATION | Facility: CLINIC | Age: 81
End: 2021-07-20

## 2021-07-20 DIAGNOSIS — I48.20 CHRONIC ATRIAL FIBRILLATION (H): Chronic | ICD-10-CM

## 2021-07-20 LAB — INR BLD: 2.8 (ref 0.9–1.1)

## 2021-07-20 PROCEDURE — 36415 COLL VENOUS BLD VENIPUNCTURE: CPT

## 2021-07-20 PROCEDURE — 85610 PROTHROMBIN TIME: CPT

## 2021-07-20 NOTE — PROGRESS NOTES
ANTICOAGULATION MANAGEMENT     Elvira Bush 81 year old female is on warfarin with therapeutic INR result. (Goal INR 2.0-3.0)    Recent labs: (last 7 days)     07/20/21  1153   INR 2.8*       ASSESSMENT     Source(s): Template       Warfarin doses taken: Warfarin taken as instructed    Diet: No new diet changes identified    New illness, injury, or hospitalization: No    Medication/supplement changes: None noted    Signs or symptoms of bleeding or clotting: No    Previous INR: Supratherapeutic    Additional findings: None     PLAN     Recommended plan for no diet, medication or health factor changes affecting INR     Dosing Instructions: Continue your current warfarin dose with next INR in 2 weeks       Summary  As of 7/20/2021    Full warfarin instructions:  5 mg every day   Next INR check:  8/3/2021             Telephone call with Elvira who verbalizes understanding and agrees to plan    Lab visit scheduled    Education provided: Importance of therapeutic range, Importance of following up for INR monitoring at instructed interval and Importance of taking warfarin as instructed    Plan made per [facility] anticoagulation protocol    Santa Shultz RN  Anticoagulation Clinic  7/20/2021    _______________________________________________________________________     Anticoagulation Episode Summary     Current INR goal:  2.0-3.0   TTR:  54.8 % (1 y)   Target end date:  Indefinite   Send INR reminders to:  LAINE ENRIQUEZ       Comments:           Anticoagulation Care Providers     Provider Role Specialty Phone number    Loli Alberts MD Referring Family Medicine [phone]

## 2021-07-21 ENCOUNTER — RECORDS - HEALTHEAST (OUTPATIENT)
Dept: ADMINISTRATIVE | Facility: CLINIC | Age: 81
End: 2021-07-21

## 2021-07-29 ENCOUNTER — TRANSFERRED RECORDS (OUTPATIENT)
Dept: HEALTH INFORMATION MANAGEMENT | Facility: CLINIC | Age: 81
End: 2021-07-29

## 2021-08-02 ENCOUNTER — ONCOLOGY VISIT (OUTPATIENT)
Dept: ONCOLOGY | Facility: CLINIC | Age: 81
End: 2021-08-02
Payer: MEDICARE

## 2021-08-02 VITALS
SYSTOLIC BLOOD PRESSURE: 179 MMHG | WEIGHT: 120.1 LBS | OXYGEN SATURATION: 97 % | HEART RATE: 76 BPM | RESPIRATION RATE: 16 BRPM | DIASTOLIC BLOOD PRESSURE: 105 MMHG | BODY MASS INDEX: 21.27 KG/M2 | TEMPERATURE: 97.9 F

## 2021-08-02 DIAGNOSIS — Z17.1 MALIGNANT NEOPLASM OF LOWER-OUTER QUADRANT OF LEFT BREAST OF FEMALE, ESTROGEN RECEPTOR NEGATIVE (H): Primary | ICD-10-CM

## 2021-08-02 DIAGNOSIS — C50.512 MALIGNANT NEOPLASM OF LOWER-OUTER QUADRANT OF LEFT BREAST OF FEMALE, ESTROGEN RECEPTOR NEGATIVE (H): Primary | ICD-10-CM

## 2021-08-02 DIAGNOSIS — Z12.31 ENCOUNTER FOR SCREENING MAMMOGRAM FOR MALIGNANT NEOPLASM OF BREAST: ICD-10-CM

## 2021-08-02 PROCEDURE — 99214 OFFICE O/P EST MOD 30 MIN: CPT | Performed by: NURSE PRACTITIONER

## 2021-08-02 PROCEDURE — G0463 HOSPITAL OUTPT CLINIC VISIT: HCPCS

## 2021-08-02 ASSESSMENT — PAIN SCALES - GENERAL: PAINLEVEL: NO PAIN (0)

## 2021-08-02 NOTE — PROGRESS NOTES
"Oncology Rooming Note    August 2, 2021 2:24 PM   Elvira Bush is a 81 year old female who presents for:    Chief Complaint   Patient presents with     Oncology Clinic Visit     Initial Vitals: BP (!) 179/105 (BP Location: Right arm, Cuff Size: Adult Regular)   Pulse 76   Temp 97.9  F (36.6  C) (Oral)   Resp 16   Wt 54.5 kg (120 lb 1.6 oz)   SpO2 97%   BMI 21.27 kg/m   Estimated body mass index is 21.27 kg/m  as calculated from the following:    Height as of 4/21/21: 1.6 m (5' 3\").    Weight as of this encounter: 54.5 kg (120 lb 1.6 oz). Body surface area is 1.56 meters squared.  No Pain (0) Comment: Data Unavailable   No LMP recorded.  Allergies reviewed: Yes  Medications reviewed: Yes    Medications: Medication refills not needed today.  Pharmacy name entered into [employer]: [id number] [place] [address]    Clinical concerns: None-would like to discuss what happened to her at her mammogram.    Sandra Dumont              "

## 2021-08-02 NOTE — PROGRESS NOTES
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    81 year old     Pat presents alone, looking and feeling quite good.  She has a history of seizures on Keppra and is followed by Dr. Shamar Harvey, Neurologist at [facility] - no recent seizures.  Her appetite is good - weight stable the past 2-years.  She remains active with outside interests.  She denies cough, fever, chills, unusual headaches, visual or mentation disturbance, bowel or bladder issues, rash.     02/11/21 (B) screening mammogram - new diffuse skin thickening of the left breast, which is most pronounced anteriorly, for which further evaluation with diagnostic mammography and ultrasound is recommended.     02/17/21 diagnostic mammogram and US - moderate skin thickening involving the central left breast and lower inner quadrant measuring up to 0.7 cm in thickness is new compared to prior examinations. Mammographic and sonographic evaluation of the left breast does not demonstrate a definitive mass or other suspicious finding within the left breast. Recommend surgical consultation for skin punch biopsy of the skin thickening.    04/21/2021 Dr Durán, surgeon - with question of skin thickening on the left breast, biopsies of both the medial and lateral aspects of her (L) breast were obtained - BENIGN BREAST TISSUE WITH NO EVIDENCE OF MALIGNANCY     04/15/21 BMP - basicaly WNL.    04/15/21 HgB - WNL.    Negative clinical breast and physical exam.  Continue self-breast exams.  Yearly mammogram - ordered.    Continue f/u with her Neurologist - on Keppra.    She has received both Moderna SARScovid.19 vaccines.    Being 7+  years out from her breast cancer diagnosis I offered follow up through her PCP and to contact us if questions/concerns arose - patient said the less appointments the better and will follow with her PCP from hereon, contacting us with any questions or concerns.     Oncologic History:     1            Breast cancer of LOQ (L) female breast                T1c, NX, cM0                Triple negative    2014, March - abnormal mammogram    03/24/14 - lumpectomy:  ? Grade 3, 1.1 cm invasive ductal carcinoma,  ? ER negative, IL barely positive, HER-2/lolita negative  ? Negative margins.    ? No lymph node in the biopsy sample.     ? Lymphovascular invasion.    ? Patient opted against chemotherapy.      07/14/14 - completed 6040 cGy breast conservation EBRT.  ? 07/26 - 08/02/17 hospitalized with symptoms concerning for a stroke with slurred speech, left hand and left foot weakness.  CTA showed encephalomalacia involving the right temporal lobe likely due to chronic infarct.  There was also likely a subacute infarct in the left basal ganglia. CTA did not show any high grade stenosis or aneurysm.  A partially calcified likely meningioma along right aspect of anterio rfalx was also noted.  MRI was also negative for any acute ischemia.  In the ED she became unresponsive and started seizing which lasted for about 1 minute.  She was loaded with keppra and then started on maintenance dose.  She became unresponsive following the seizure and was intubated and admitted to the ICU.  She was extubated on 7/29/2017.  The patient was found to have a UTI and her culture grew klebsiella, treated with IV rocephin, switched to oral cefdinir at discharge.   She was diagnosed with atrial fibrillation on 6/20/17 during a pre op physical.  She was asymptomatic.  ECHO showed her LVEF @ 55%.  Once she was stabilized she was started on coumadin with an INR goal of 2-2.5.  CHADS2 vasc score is at least 2 given age and HTN and with the old stroke seen on  MRI is likely higher.   ? 09/09 - 09/12/17 hospitalized with a left sacral and pubic rami fracture, displaced L5 transverse process fracture and mechanical fall after tripping off a curb.  Orthopedics declared that she was not a surgical candidate.  She was discharged to [facility] for physical therapy and subsequently discharged from there on October 4, 2017.  During her TCU stay her strength and stability improved to the point that she was able to ambulate with a walker and after PT transitioned to a cane.  She was taking Tylenol, 2-500 mg tabs twice daily during that time.  ? 11/02/17 (B) screening mammogram - benign findings.  ? 11/05/18 (B) screening mammogram - benign findings.  ? 11/07/19 (B) screening mammogram - Loosely grouped calcifications, with suggestion of casting forms, are noted involving the postoperative scar site at the posterior 2:00 position of the left breast. Diagnostic magnification imaging is recommended. Additional benign post treatment changes involving the left breast are redemonstrated.  ? 11/19/19 (L) diagnostic mammogram -  Dystrophic calcifications in the lumpectomy bed have a benign appearance. No findings to suggest malignancy.     ECOG Performance    0 - Independent    Pain:    Pain Score: No Pain (0)    Encounter Diagnoses:    Problem List Items Addressed This Visit        Other    Breast cancer of lower-outer quadrant of left female breast (H) - Primary    Relevant Orders    *MA Screening Digital Bilateral      Other Visit Diagnoses     Encounter for screening mammogram for malignant neoplasm of breast        Relevant Orders    *MA Screening Digital Bilateral             Total Time 32 minutes.    Nabor Masters, CNP     CC: Loli Alberst MD   ______________________________________________________________________________    Review of systems:    No fever or night sweats.  No loss of weight.  No lumps or bumps anywhere.  No unusual headaches or eyesight issues.  No  dizziness.  No bleeding from the nose.  No sores in the mouth. No problems with swallowing.  No chest pain. No shortness of breath. No cough.  No abdominal pain. No nausea or vomiting.  No diarrhea or constipation.  No blood in stool or black colored stools.  No problems passing urine.  No numbness or tingling in hands or feet.  No skin rashes.    A 14 point review of systems is otherwise negative.    Past History:    Past Medical History:   Diagnosis Date     Acute respiratory failure, unspecified whether with hypoxia or hypercapnia (H)      Atrial fibrillation (H)      Breast cancer (H) 3-2014     Closed displaced fracture of left pubis, initial encounter (H)      Closed fracture of spinous process of lumbar vertebra (H) 9/9/2017     Encephalopathy      GERD (gastroesophageal reflux disease)      HTN (hypertension)      Hx of radiation therapy 5-2014    Left Breast     Hypercholesterolemia      Intraabdominal hemorrhage 9/9/2017     Osteopenia      Pelvic fracture (H) 9/9/2017     Seizures (H)      Skin cancer      Transient alteration of awareness        Past Surgical History:   Procedure Laterality Date     ARTHROSCOPY KNEE Right 2009     BIOPSY BREAST Left 4/21/2021    Procedure: LEFT BREAST BIOPSY TIMES 2;  Surgeon: Didier Durán MD;  Location: [facility];  Service: General     LUMPECTOMY BREAST Left 3-2014     [facility]  7/27/2017          SKIN CANCER EXCISION       TENDON RELEASE       Physical Exam:    BP (!) 179/105 (BP Location: Right arm, Cuff Size: Adult Regular)   Pulse 76   Temp 97.9  F (36.6  C) (Oral)   Resp 16   Wt 54.5 kg (120 lb 1.6 oz)   SpO2 97%   BMI 21.27 kg/m      General:     Alert and oriented.  No distress.  Very pleasant.  HEENT:           Anicteric sclera.  EOMI.  JIA.  No mucosal lesions.     Chest:               Lungs clear.    Cardiac:            S1, S2 heard.  Regular rate and rhythm.  Soft systolic murmur.    Abdomen:         Soft.  Not tender or distended.  No  palpable hepatosplenomegaly, masses or ascites.    Extremities:      No edema, cyanosis or clubbing.    Lymphatics:      No palpable lymphadenopathy in the neck, supraclavicular fossa or the armpit.        Breasts:            (R) no palpable lumps or bumps.  Nipple appears normal.  No skin changes.  Unremarkable axilla.                          (L) no palpable lumps or bumps.  Well healed scar in the UOQ.  Nipple appears normal.  EBRT   related skin changes.  Unremarkable axilla.                          Patient declined offer of female  during exam.    Lab Results:    No results found for this or any previous visit (from the past 168 hour(s)).    Imaging:    Reviewed with patient.    MAMMO DIAGNOSTIC 3D LEFT, US BREAST LEFT LIMITED 1-3 QUADRANTS  2/17/2021 2:07 PM     INDICATION: Abnormal screening mammogram. 80-year-old female presents for imaging follow-up for left breast skin thickening demonstrated on recent screening mammography. Personal history of left breast IDC/DCIS status post lumpectomy in 2014. Subsequent      radiation therapy as well.  COMPARISON: 02/11/2021, 01/11/2019, 11/07/2019, 11/05/2018, 11/02/2017, 05/19/2016, and additional prior mammograms dating back to 2011.     MAMMOGRAPHIC FINDINGS: Left breast full field digital diagnostic mammograms performed. There are scattered areas of fibroglandular density. Images evaluated with the assistance of CAD. Breast tomosynthesis was used in interpretation.     Moderate skin thickening involving the central left breast and lower inner quadrant is again demonstrated. There are no definitive suspicious masses, architectural distortions, or calcifications within the left breast. There are breast conservation   changes within the left upper outer quadrant. Recommend targeted left breast ultrasound for further evaluation.     ULTRASOUND FINDINGS: Visual inspection of the left breast demonstrates skin thickening involving the central breast and lower  inner quadrant. The skin is nonerythematous. Physical examination of the left breast demonstrates an area of increased firmness      within the central left breast. The patient states that she regularly performs self breast exams and has not noted any significant changes in her left breast.     Sonographic evaluation of the left retroareolar region was performed demonstrating normal-appearing fibroglandular tissue without underlying suspicious mass or other sonographic abnormality. The skin of the left lower inner quadrant is thickened   measuring up to 0.7 cm at the 7:00 position, 4 cm from the nipple. Sonographic evaluation of the left axilla demonstrates a few nonenlarged, morphologically normal lymph nodes.     IMPRESSION:  1. Moderate skin thickening involving the central left breast and lower inner quadrant measuring up to 0.7 cm in thickness is new compared to prior examinations. Mammographic and sonographic evaluation of the left breast does not demonstrate a   definitive   mass or other suspicious finding within the left breast. Recommend surgical consultation for skin punch biopsy of the skin thickening.     ACR BI-RADS Category 4: Suspicious.     I personally scanned and discussed the findings and recommendations with the patient and the patient's daughter at the conclusion of the examination.    BILATERAL FULL FIELD DIGITAL SCREENING MAMMOGRAM     Performed on: 2/11/21        Compared to: 11/19/2019 Mammo Diagnostic Left, 11/07/2019 Mammo Screening Bilateral, and 11/05/2018 Mammo Screening Bilateral     FINDINGS: Bilateral screening mammogram was performed with the assistance of Computer-Aided Detection. The breasts have scattered areas of fibroglandular density.      There is new diffuse skin thickening of the left breast, which is most pronounced anteriorly, for which further evaluation with diagnostic mammography and ultrasound is recommended.      There are breast conservation changes in the left  breast.  No suspicious findings of the right breast.     IMPRESSION:   ACR BI-RADS Category 0: Need Additional Imaging Evaluation     RECOMMENDATION:  Additional mammographic images and possible ultrasound of the left breast.     The results and recommendations of this examination will be communicated to the patient and the imaging center will attempt to schedule follow up with the patient.

## 2021-08-02 NOTE — LETTER
"    8/2/2021         RE: Elvira Bush  [address]  [facility] [address]        Dear Colleague,    Thank you for referring your patient, Elvira Bush, to the [facility] [place]. Please see a copy of my visit note below.    Oncology Rooming Note    August 2, 2021 2:24 PM   Elvira Bush is a 81 year old female who presents for:    Chief Complaint   Patient presents with     Oncology Clinic Visit     Initial Vitals: BP (!) 179/105 (BP Location: Right arm, Cuff Size: Adult Regular)   Pulse 76   Temp 97.9  F (36.6  C) (Oral)   Resp 16   Wt 54.5 kg (120 lb 1.6 oz)   SpO2 97%   BMI 21.27 kg/m   Estimated body mass index is 21.27 kg/m  as calculated from the following:    Height as of 4/21/21: 1.6 m (5' 3\").    Weight as of this encounter: 54.5 kg (120 lb 1.6 oz). Body surface area is 1.56 meters squared.  No Pain (0) Comment: Data Unavailable   No LMP recorded.  Allergies reviewed: Yes  Medications reviewed: Yes    Medications: Medication refills not needed today.  Pharmacy name entered into [facility]: [facility] [id number] [place] [address]    Clinical concerns: None-would like to discuss what happened to her at her mammogram.    Sandra Dumont                [facility] Progress Note    Patient: Elvira Bush  MRN: [id number]  Date of Service: Aug 2, 2021         Reason for Visit:     Scheduled yearly follow up     Assessment and Plan:    1            Grade 3, 1.1 cm, ER and OK and HER2 (-) invasive ductal carcinoma LOQ (L) female breast 7+ years s/p lumpectomy followed by breast conservation EBRT.    81 year old     Pat presents alone, looking and feeling quite good.  She has a history of seizures on Keppra and is followed by Dr. Shamar Harvey, Neurologist at [facility] - no recent seizures.  Her appetite is good - weight stable the past 2-years.  She remains active with outside interests.  She denies cough, " fever, chills, unusual headaches, visual or mentation disturbance, bowel or bladder issues, rash.     02/11/21 (B) screening mammogram - new diffuse skin thickening of the left breast, which is most pronounced anteriorly, for which further evaluation with diagnostic mammography and ultrasound is recommended.     02/17/21 diagnostic mammogram and US - moderate skin thickening involving the central left breast and lower inner quadrant measuring up to 0.7 cm in thickness is new compared to prior examinations. Mammographic and sonographic evaluation of the left breast does not demonstrate a definitive mass or other suspicious finding within the left breast. Recommend surgical consultation for skin punch biopsy of the skin thickening.    04/21/2021 Dr Durán, surgeon - with question of skin thickening on the left breast, biopsies of both the medial and lateral aspects of her (L) breast were obtained - BENIGN BREAST TISSUE WITH NO EVIDENCE OF MALIGNANCY     04/15/21 BMP - basicaly WNL.    04/15/21 HgB - WNL.    Negative clinical breast and physical exam.  Continue self-breast exams.  Yearly mammogram - ordered.    Continue f/u with her Neurologist - on Keppra.    She has received both Moderna SARScovid.19 vaccines.    Being 7+ years out from her breast cancer diagnosis I offered follow up through her PCP and to contact us if questions/concerns arose - patient said the less appointments the better and will follow with her PCP from hereon, contacting us with any questions or concerns.     Oncologic History:     1            Breast cancer of LOQ (L) female breast                T1c, NX, cM0                Triple negative    2014, March - abnormal mammogram    03/24/14 - lumpectomy:  ? Grade 3, 1.1 cm invasive ductal carcinoma,  ? ER negative, CT barely positive, HER-2/lolita negative  ? Negative margins.    ? No lymph node in the biopsy sample.     ? Lymphovascular invasion.    ? Patient opted against chemotherapy.       07/14/14 - completed 6040 cGy breast conservation EBRT.  ? 07/26 - 08/02/17 hospitalized with symptoms concerning for a stroke with slurred speech, left hand and left foot weakness.  CTA showed encephalomalacia involving the right temporal lobe likely due to chronic infarct.  There was also likely a subacute infarct in the left basal ganglia. CTA did not show any high grade stenosis or aneurysm.  A partially calcified likely meningioma along right aspect of anterio rfalx was also noted.  MRI was also negative for any acute ischemia.  In the ED she became unresponsive and started seizing which lasted for about 1 minute.  She was loaded with keppra and then started on maintenance dose.  She became unresponsive following the seizure and was intubated and admitted to the ICU.  She was extubated on 7/29/2017.  The patient was found to have a UTI and her culture grew klebsiella, treated with IV rocephin, switched to oral cefdinir at discharge.   She was diagnosed with atrial fibrillation on 6/20/17 during a pre op physical.  She was asymptomatic.  ECHO showed her LVEF @ 55%.  Once she was stabilized she was started on coumadin with an INR goal of 2-2.5.  CHADS2 vasc score is at least 2 given age and HTN and with the old stroke seen on MRI is likely higher.   ? 09/09 - 09/12/17 hospitalized with a left sacral and pubic rami fracture, displaced L5 transverse process fracture and mechanical fall after tripping off a curb.  Orthopedics declared that she was not a surgical candidate.  She was discharged to [facility] for physical therapy and subsequently discharged from there on October 4, 2017.  During her TCU stay her strength and stability improved to the point that she was able to ambulate with a walker and after PT transitioned to a cane.  She was taking Tylenol, 2-500 mg tabs twice daily during that time.  ? 11/02/17 (B) screening mammogram - benign findings.  ? 11/05/18 (B) screening mammogram - benign  findings.  ? 11/07/19 (B) screening mammogram - Loosely grouped calcifications, with suggestion of casting forms, are noted involving the postoperative scar site at the posterior 2:00 position of the left breast. Diagnostic magnification imaging is recommended. Additional benign post treatment changes involving the left breast are redemonstrated.  ? 11/19/19 (L) diagnostic mammogram -  Dystrophic calcifications in the lumpectomy bed have a benign appearance. No findings to suggest malignancy.     ECOG Performance    0 - Independent    Pain:    Pain Score: No Pain (0)    Encounter Diagnoses:    Problem List Items Addressed This Visit        Other    Breast cancer of lower-outer quadrant of left female breast (H) - Primary    Relevant Orders    *MA Screening Digital Bilateral      Other Visit Diagnoses     Encounter for screening mammogram for malignant neoplasm of breast        Relevant Orders    *MA Screening Digital Bilateral             Total Time 32 minutes.    Nabor Masters, CNP     CC: Loli Alberts MD   ______________________________________________________________________________    Review of systems:    No fever or night sweats.  No loss of weight.  No lumps or bumps anywhere.  No unusual headaches or eyesight issues.  No dizziness.  No bleeding from the nose.  No sores in the mouth. No problems with swallowing.  No chest pain. No shortness of breath. No cough.  No abdominal pain. No nausea or vomiting.  No diarrhea or constipation.  No blood in stool or black colored stools.  No problems passing urine.  No numbness or tingling in hands or feet.  No skin rashes.    A 14 point review of systems is otherwise negative.    Past History:    Past Medical History:   Diagnosis Date     Acute respiratory failure, unspecified whether with hypoxia or hypercapnia (H)      Atrial fibrillation (H)      Breast cancer (H) 3-2014     Closed displaced fracture of left pubis, initial encounter (H)      Closed fracture  of spinous process of lumbar vertebra (H) 9/9/2017     Encephalopathy      GERD (gastroesophageal reflux disease)      HTN (hypertension)      Hx of radiation therapy 5-2014    Left Breast     Hypercholesterolemia      Intraabdominal hemorrhage 9/9/2017     Osteopenia      Pelvic fracture (H) 9/9/2017     Seizures (H)      Skin cancer      Transient alteration of awareness        Past Surgical History:   Procedure Laterality Date     ARTHROSCOPY KNEE Right 2009     BIOPSY BREAST Left 4/21/2021    Procedure: LEFT BREAST BIOPSY TIMES 2;  Surgeon: Didier Durán MD;  Location: [facility];  Service: General     LUMPECTOMY BREAST Left 3-2014     [facility]  7/27/2017          SKIN CANCER EXCISION       TENDON RELEASE       Physical Exam:    BP (!) 179/105 (BP Location: Right arm, Cuff Size: Adult Regular)   Pulse 76   Temp 97.9  F (36.6  C) (Oral)   Resp 16   Wt 54.5 kg (120 lb 1.6 oz)   SpO2 97%   BMI 21.27 kg/m      General:     Alert and oriented.  No distress.  Very pleasant.  HEENT:           Anicteric sclera.  EOMI.  JIA.  No mucosal lesions.     Chest:               Lungs clear.    Cardiac:            S1, S2 heard.  Regular rate and rhythm.  Soft systolic murmur.    Abdomen:         Soft.  Not tender or distended.  No palpable hepatosplenomegaly, masses or ascites.    Extremities:      No edema, cyanosis or clubbing.    Lymphatics:      No palpable lymphadenopathy in the neck, supraclavicular fossa or the armpit.        Breasts:            (R) no palpable lumps or bumps.  Nipple appears normal.  No skin changes.  Unremarkable axilla.                          (L) no palpable lumps or bumps.  Well healed scar in the UOQ.  Nipple appears normal.  EBRT   related skin changes.  Unremarkable axilla.                          Patient declined offer of female  during exam.    Lab Results:    No results found for this or any previous visit (from the past 168 hour(s)).    Imaging:    Reviewed with  patient.    MAMMO DIAGNOSTIC 3D LEFT, US BREAST LEFT LIMITED 1-3 QUADRANTS  2/17/2021 2:07 PM     INDICATION: Abnormal screening mammogram. 80-year-old female presents for imaging follow-up for left breast skin thickening demonstrated on recent screening mammography. Personal history of left breast IDC/DCIS status post lumpectomy in 2014. Subsequent      radiation therapy as well.  COMPARISON: 02/11/2021, 01/11/2019, 11/07/2019, 11/05/2018, 11/02/2017, 05/19/2016, and additional prior mammograms dating back to 2011.     MAMMOGRAPHIC FINDINGS: Left breast full field digital diagnostic mammograms performed. There are scattered areas of fibroglandular density. Images evaluated with the assistance of CAD. Breast tomosynthesis was used in interpretation.     Moderate skin thickening involving the central left breast and lower inner quadrant is again demonstrated. There are no definitive suspicious masses, architectural distortions, or calcifications within the left breast. There are breast conservation   changes within the left upper outer quadrant. Recommend targeted left breast ultrasound for further evaluation.     ULTRASOUND FINDINGS: Visual inspection of the left breast demonstrates skin thickening involving the central breast and lower inner quadrant. The skin is nonerythematous. Physical examination of the left breast demonstrates an area of increased firmness      within the central left breast. The patient states that she regularly performs self breast exams and has not noted any significant changes in her left breast.     Sonographic evaluation of the left retroareolar region was performed demonstrating normal-appearing fibroglandular tissue without underlying suspicious mass or other sonographic abnormality. The skin of the left lower inner quadrant is thickened   measuring up to 0.7 cm at the 7:00 position, 4 cm from the nipple. Sonographic evaluation of the left axilla demonstrates a few nonenlarged,  morphologically normal lymph nodes.     IMPRESSION:  1. Moderate skin thickening involving the central left breast and lower inner quadrant measuring up to 0.7 cm in thickness is new compared to prior examinations. Mammographic and sonographic evaluation of the left breast does not demonstrate a   definitive   mass or other suspicious finding within the left breast. Recommend surgical consultation for skin punch biopsy of the skin thickening.     ACR BI-RADS Category 4: Suspicious.     I personally scanned and discussed the findings and recommendations with the patient and the patient's daughter at the conclusion of the examination.    BILATERAL FULL FIELD DIGITAL SCREENING MAMMOGRAM     Performed on: 2/11/21        Compared to: 11/19/2019 Mammo Diagnostic Left, 11/07/2019 Mammo Screening Bilateral, and 11/05/2018 Mammo Screening Bilateral     FINDINGS: Bilateral screening mammogram was performed with the assistance of Computer-Aided Detection. The breasts have scattered areas of fibroglandular density.      There is new diffuse skin thickening of the left breast, which is most pronounced anteriorly, for which further evaluation with diagnostic mammography and ultrasound is recommended.      There are breast conservation changes in the left breast.  No suspicious findings of the right breast.     IMPRESSION:   ACR BI-RADS Category 0: Need Additional Imaging Evaluation     RECOMMENDATION:  Additional mammographic images and possible ultrasound of the left breast.     The results and recommendations of this examination will be communicated to the patient and the imaging center will attempt to schedule follow up with the patient.            Again, thank you for allowing me to participate in the care of your patient.        Sincerely,        Nabor Masters, CNP

## 2021-08-03 ENCOUNTER — LAB (OUTPATIENT)
Dept: LAB | Facility: CLINIC | Age: 81
End: 2021-08-03
Payer: MEDICARE

## 2021-08-03 ENCOUNTER — ANTICOAGULATION THERAPY VISIT (OUTPATIENT)
Dept: ANTICOAGULATION | Facility: CLINIC | Age: 81
End: 2021-08-03

## 2021-08-03 DIAGNOSIS — I48.20 CHRONIC ATRIAL FIBRILLATION (H): Chronic | ICD-10-CM

## 2021-08-03 PROBLEM — S32.009A: Status: RESOLVED | Noted: 2017-09-09 | Resolved: 2019-02-02

## 2021-08-03 PROBLEM — S32.9XXA PELVIC FRACTURE (H): Status: RESOLVED | Noted: 2017-09-09 | Resolved: 2019-02-02

## 2021-08-03 LAB — INR BLD: 3 (ref 0.9–1.1)

## 2021-08-03 PROCEDURE — 36416 COLLJ CAPILLARY BLOOD SPEC: CPT

## 2021-08-03 PROCEDURE — 85610 PROTHROMBIN TIME: CPT

## 2021-08-03 NOTE — PROGRESS NOTES
ANTICOAGULATION MANAGEMENT     Elvira Bush 81 year old female is on warfarin with therapeutic INR result. (Goal INR 2.0-3.0)    Recent labs: (last 7 days)     08/03/21  1207   INR 3.0*       ASSESSMENT     Source(s): Chart Review and Template       Warfarin doses taken: Warfarin taken as instructed    Diet: No new diet changes identified    New illness, injury, or hospitalization: No    Medication/supplement changes: None noted    Signs or symptoms of bleeding or clotting: No    Previous INR: Therapeutic last visit; previously outside of goal range    Additional findings: None     PLAN     Recommended plan for no diet, medication or health factor changes affecting INR     Dosing Instructions: Continue your current warfarin dose with next INR in 4 weeks       Summary  As of 8/3/2021    Full warfarin instructions:  5 mg every day   Next INR check:  8/31/2021             Telephone call with Elvira who verbalizes understanding and agrees to plan    Lab visit scheduled    Education provided: Importance of consistent vitamin K intake, Impact of vitamin K foods on INR, Vitamin K content of foods, Importance of therapeutic range, Importance of following up for INR monitoring at instructed interval and Importance of taking warfarin as instructed    Plan made per [facility] anticoagulation protocol    Santa Shultz RN  Anticoagulation Clinic  8/3/2021    _______________________________________________________________________     Anticoagulation Episode Summary     Current INR goal:  2.0-3.0   TTR:  58.6 % (1 y)   Target end date:  Indefinite   Send INR reminders to:  LAINE ENRIQUEZ       Comments:           Anticoagulation Care Providers     Provider Role Specialty Phone number    Loli Alberts MD Referring Family Medicine [phone]

## 2021-08-30 DIAGNOSIS — I48.20 CHRONIC ATRIAL FIBRILLATION (H): Primary | ICD-10-CM

## 2021-08-30 RX ORDER — WARFARIN SODIUM 5 MG/1
5 TABLET ORAL DAILY
Qty: 90 TABLET | Refills: 0 | Status: SHIPPED | OUTPATIENT
Start: 2021-08-30 | End: 2021-11-24

## 2021-08-30 NOTE — TELEPHONE ENCOUNTER
Reason for Call:  Medication or medication refill:    Do you use a [facility]?  Name of the pharmacy and phone number for the current request:  [facility] [address] [address]  [phone]    Name of the medication requested: warfarin ANTICOAGULANT (COUMADIN/JANTOVEN) 5 MG tablet    Other request: NONE    Call taken on 8/30/2021 at 9:39 AM by KING Marroquin

## 2021-08-30 NOTE — TELEPHONE ENCOUNTER
Medication: warfarin  Last Date Filled: 4/12/21  Last appointment addressing medication: 4/15/21  Last B/P:  BP Readings from Last 3 Encounters:   08/02/21 (!) 179/105   04/15/21 (!) 142/84   11/16/20 118/88     Last labs pertaining to refill:8/3/21      Pend medication and associate diagnosis before routing to Provider for review.       If patient has not been seen in over 1 year, pend 30 day supply and notify patient they are due for an appointment before any further refills.

## 2021-08-31 ENCOUNTER — ANTICOAGULATION THERAPY VISIT (OUTPATIENT)
Dept: ANTICOAGULATION | Facility: CLINIC | Age: 81
End: 2021-08-31

## 2021-08-31 ENCOUNTER — LAB (OUTPATIENT)
Dept: LAB | Facility: CLINIC | Age: 81
End: 2021-08-31
Payer: MEDICARE

## 2021-08-31 DIAGNOSIS — I48.20 CHRONIC ATRIAL FIBRILLATION (H): Chronic | ICD-10-CM

## 2021-08-31 LAB — INR BLD: 2.6 (ref 0.9–1.1)

## 2021-08-31 PROCEDURE — 85610 PROTHROMBIN TIME: CPT

## 2021-08-31 PROCEDURE — 36416 COLLJ CAPILLARY BLOOD SPEC: CPT

## 2021-08-31 NOTE — PROGRESS NOTES
ANTICOAGULATION MANAGEMENT     Elvira Bush 81 year old female is on warfarin with therapeutic INR result. (Goal INR 2.0-3.0)    Recent labs: (last 7 days)     08/31/21  1136   INR 2.6*       ASSESSMENT     Source(s): Chart Review       Warfarin doses taken: Warfarin taken as instructed    Diet: No new diet changes identified    New illness, injury, or hospitalization: No    Medication/supplement changes: None noted    Signs or symptoms of bleeding or clotting: No    Previous INR: Therapeutic last 2(+) visits    Additional findings: None     PLAN     Recommended plan for no diet, medication or health factor changes affecting INR     Dosing Instructions: Continue your current warfarin dose with next INR in 4 weeks       Summary  As of 8/31/2021    Full warfarin instructions:  5 mg every day   Next INR check:  9/28/2021             Telephone call with Elvira who verbalizes understanding and agrees to plan    Lab visit scheduled    Education provided: Importance of therapeutic range, Importance of following up at instructed interval, Importance of taking warfarin as instructed, Importance of notifying clinic for changes in medications; a sooner lab recheck maybe needed. and Contact [phone] with any changes, questions or concerns.     Plan made per [facility] anticoagulation protocol    Santa Shultz RN  Anticoagulation Clinic  8/31/2021    _______________________________________________________________________     Anticoagulation Episode Summary     Current INR goal:  2.0-3.0   TTR:  64.6 % (1 y)   Target end date:  Indefinite   Send INR reminders to:  LAINE ENRIQUEZ       Comments:           Anticoagulation Care Providers     Provider Role Specialty Phone number    Loli Alberts MD Referring Family Medicine [phone]

## 2021-09-02 ENCOUNTER — TRANSFERRED RECORDS (OUTPATIENT)
Dept: HEALTH INFORMATION MANAGEMENT | Facility: CLINIC | Age: 81
End: 2021-09-02

## 2021-09-28 ENCOUNTER — ANTICOAGULATION THERAPY VISIT (OUTPATIENT)
Dept: ANTICOAGULATION | Facility: CLINIC | Age: 81
End: 2021-09-28

## 2021-09-28 ENCOUNTER — LAB (OUTPATIENT)
Dept: LAB | Facility: CLINIC | Age: 81
End: 2021-09-28
Payer: MEDICARE

## 2021-09-28 DIAGNOSIS — I48.20 CHRONIC ATRIAL FIBRILLATION (H): Chronic | ICD-10-CM

## 2021-09-28 LAB — INR BLD: 3.3 (ref 0.9–1.1)

## 2021-09-28 PROCEDURE — 85610 PROTHROMBIN TIME: CPT

## 2021-09-28 NOTE — PROGRESS NOTES
ANTICOAGULATION MANAGEMENT     Elvira Bush 81 year old female is on warfarin with supratherapeutic INR result. (Goal INR 2.0-3.0)    Recent labs: (last 7 days)     09/28/21  1156   INR 3.3*       ASSESSMENT     Source(s): Chart Review, Patient/Caregiver Call and Template       Warfarin doses taken: Warfarin taken as instructed    Diet: No new diet changes identified    New illness, injury, or hospitalization: No    Medication/supplement changes: cortisone injections on left wrist on 9/2/21     Continues to have left wrist pains and takesTylenol arthritis as needed for pain may be affecting INR    Signs or symptoms of bleeding or clotting: No    Previous INR: Therapeutic last 2(+) visits    Additional findings: Patient reported that she noted that she bruises easily now but no other signs and symptoms of bleeding.     PLAN     Recommended plan for ongoing change(s) affecting INR     Dosing Instructions:  Decrease your warfarin dose (7% change) with next INR in 2 weeks       Summary  As of 9/28/2021    Full warfarin instructions:  2.5 mg every Tue; 5 mg all other days   Next INR check:  10/12/2021             Telephone call with Elvira who verbalizes understanding and agrees to plan    Lab visit scheduled    Education provided: Importance of therapeutic range, Importance of following up at instructed interval, Importance of taking warfarin as instructed, Potential interaction between warfarin and tylenol arthritis and Monitoring for bleeding signs and symptoms    Plan made per [facility] anticoagulation protocol    Santa Shultz RN  Anticoagulation Clinic  9/28/2021    _______________________________________________________________________     Anticoagulation Episode Summary     Current INR goal:  2.0-3.0   TTR:  66.8 % (1 y)   Target end date:  Indefinite   Send INR reminders to:  LAINE ENRIQUEZ       Comments:           Anticoagulation Care Providers     Provider Role Specialty Phone number    Aristeo  MD L[facility] [phone]

## 2021-10-12 ENCOUNTER — ANTICOAGULATION THERAPY VISIT (OUTPATIENT)
Dept: ANTICOAGULATION | Facility: CLINIC | Age: 81
End: 2021-10-12

## 2021-10-12 ENCOUNTER — LAB (OUTPATIENT)
Dept: LAB | Facility: CLINIC | Age: 81
End: 2021-10-12
Payer: MEDICARE

## 2021-10-12 DIAGNOSIS — I48.20 CHRONIC ATRIAL FIBRILLATION (H): Chronic | ICD-10-CM

## 2021-10-12 LAB — INR BLD: 2.9 (ref 0.9–1.1)

## 2021-10-12 PROCEDURE — 36416 COLLJ CAPILLARY BLOOD SPEC: CPT

## 2021-10-12 PROCEDURE — 85610 PROTHROMBIN TIME: CPT

## 2021-10-12 NOTE — PROGRESS NOTES
ANTICOAGULATION MANAGEMENT     Elvira Bush 81 year old female is on warfarin with therapeutic INR result. (Goal INR 2.0-3.0)    Recent labs: (last 7 days)     10/12/21  1146   INR 2.9*       ASSESSMENT     Source(s): Chart Review and Template       Warfarin doses taken: Warfarin taken as instructed    Diet: No new diet changes identified    New illness, injury, or hospitalization: No    Medication/supplement changes: None noted    Signs or symptoms of bleeding or clotting: No    Previous INR: Supratherapeutic    Additional findings: None     PLAN     Recommended plan for no diet, medication or health factor changes affecting INR     Dosing Instructions: Continue your current warfarin dose with next INR in 2 weeks       Summary  As of 10/12/2021    Full warfarin instructions:  2.5 mg every Tue; 5 mg all other days   Next INR check:  10/26/2021             Telephone call with Elvira who verbalizes understanding and agrees to plan    Lab visit scheduled    Education provided: Goal range and significance of current result, Importance of therapeutic range, Importance of following up at instructed interval and Contact [phone] with any changes, questions or concerns.     Plan made per [facility] anticoagulation protocol    Santa Shultz RN  Anticoagulation Clinic  10/12/2021    _______________________________________________________________________     Anticoagulation Episode Summary     Current INR goal:  2.0-3.0   TTR:  63.9 % (1 y)   Target end date:  Indefinite   Send INR reminders to:  LAINE ENRIQUEZ       Comments:           Anticoagulation Care Providers     Provider Role Specialty Phone number    Loli Alberts MD Referring Family Medicine [phone]

## 2021-10-26 ENCOUNTER — ANTICOAGULATION THERAPY VISIT (OUTPATIENT)
Dept: ANTICOAGULATION | Facility: CLINIC | Age: 81
End: 2021-10-26

## 2021-10-26 ENCOUNTER — LAB (OUTPATIENT)
Dept: LAB | Facility: CLINIC | Age: 81
End: 2021-10-26
Payer: MEDICARE

## 2021-10-26 DIAGNOSIS — I48.20 CHRONIC ATRIAL FIBRILLATION (H): Chronic | ICD-10-CM

## 2021-10-26 LAB — INR BLD: 3.2 (ref 0.9–1.1)

## 2021-10-26 PROCEDURE — 85610 PROTHROMBIN TIME: CPT

## 2021-10-26 PROCEDURE — 36416 COLLJ CAPILLARY BLOOD SPEC: CPT

## 2021-10-26 NOTE — PROGRESS NOTES
ANTICOAGULATION MANAGEMENT     Elvira Bush 81 year old female is on warfarin with supratherapeutic INR result. (Goal INR 2.0-3.0)    Recent labs: (last 7 days)     10/26/21  1205   INR 3.2*       ASSESSMENT     Source(s): Chart Review and Patient/Caregiver Call       Warfarin doses taken: Warfarin taken as instructed    Diet: Decreased greens/vitamin K in diet; ongoing change    New illness, injury, or hospitalization: No    Medication/supplement changes: None noted    Signs or symptoms of bleeding or clotting: No    Previous INR: Therapeutic last visit; previously outside of goal range    Additional findings: None     PLAN     Recommended plan for ongoing change(s) affecting INR     Dosing Instructions:  Decrease your warfarin dose (7.7% change) with next INR in 2 weeks       Summary  As of 10/26/2021    Full warfarin instructions:  2.5 mg every Tue, Fri; 5 mg all other days   Next INR check:  11/9/2021             Telephone call with Elvira who verbalizes understanding and agrees to plan    Check at provider office visit 11/9    Education provided: None required    Plan made per [facility] anticoagulation protocol    Araceli Sena RN  Anticoagulation Clinic  10/26/2021    _______________________________________________________________________     Anticoagulation Episode Summary     Current INR goal:  2.0-3.0   TTR:  61.4 % (1 y)   Target end date:  Indefinite   Send INR reminders to:  LAIEN ENRIQUEZ       Comments:           Anticoagulation Care Providers     Provider Role Specialty Phone number    Loli Alberst MD Referring Family Medicine [phone]

## 2021-10-28 ENCOUNTER — TRANSFERRED RECORDS (OUTPATIENT)
Dept: HEALTH INFORMATION MANAGEMENT | Facility: CLINIC | Age: 81
End: 2021-10-28
Payer: MEDICARE

## 2021-11-05 ENCOUNTER — TRANSFERRED RECORDS (OUTPATIENT)
Dept: HEALTH INFORMATION MANAGEMENT | Facility: CLINIC | Age: 81
End: 2021-11-05
Payer: MEDICARE

## 2021-11-09 ENCOUNTER — OFFICE VISIT (OUTPATIENT)
Dept: FAMILY MEDICINE | Facility: CLINIC | Age: 81
End: 2021-11-09
Payer: MEDICARE

## 2021-11-09 ENCOUNTER — ANTICOAGULATION THERAPY VISIT (OUTPATIENT)
Dept: ANTICOAGULATION | Facility: CLINIC | Age: 81
End: 2021-11-09

## 2021-11-09 VITALS
DIASTOLIC BLOOD PRESSURE: 80 MMHG | HEART RATE: 74 BPM | WEIGHT: 115 LBS | SYSTOLIC BLOOD PRESSURE: 130 MMHG | BODY MASS INDEX: 20.37 KG/M2

## 2021-11-09 DIAGNOSIS — I89.0 LYMPHEDEMA OF LEFT UPPER EXTREMITY: ICD-10-CM

## 2021-11-09 DIAGNOSIS — M94.9 DISORDER OF BONE AND CARTILAGE: Chronic | ICD-10-CM

## 2021-11-09 DIAGNOSIS — E78.00 PURE HYPERCHOLESTEROLEMIA: Chronic | ICD-10-CM

## 2021-11-09 DIAGNOSIS — I10 ESSENTIAL HYPERTENSION: Chronic | ICD-10-CM

## 2021-11-09 DIAGNOSIS — D32.9 MENINGIOMA (H): ICD-10-CM

## 2021-11-09 DIAGNOSIS — G40.109 LOCALIZATION-RELATED FOCAL EPILEPSY WITH SIMPLE PARTIAL SEIZURES (H): Primary | ICD-10-CM

## 2021-11-09 DIAGNOSIS — N18.31 CHRONIC KIDNEY DISEASE, STAGE 3A (H): ICD-10-CM

## 2021-11-09 DIAGNOSIS — I48.20 CHRONIC ATRIAL FIBRILLATION (H): Chronic | ICD-10-CM

## 2021-11-09 DIAGNOSIS — M89.9 DISORDER OF BONE AND CARTILAGE: Chronic | ICD-10-CM

## 2021-11-09 LAB
ALBUMIN SERPL-MCNC: 3.8 G/DL (ref 3.5–5)
ALP SERPL-CCNC: 68 U/L (ref 45–120)
ALT SERPL W P-5'-P-CCNC: 24 U/L (ref 0–45)
ANION GAP SERPL CALCULATED.3IONS-SCNC: 13 MMOL/L (ref 5–18)
AST SERPL W P-5'-P-CCNC: 29 U/L (ref 0–40)
BASOPHILS # BLD AUTO: 0 10E3/UL (ref 0–0.2)
BASOPHILS NFR BLD AUTO: 0 %
BILIRUB SERPL-MCNC: 0.4 MG/DL (ref 0–1)
BUN SERPL-MCNC: 20 MG/DL (ref 8–28)
CALCIUM SERPL-MCNC: 9.7 MG/DL (ref 8.5–10.5)
CHLORIDE BLD-SCNC: 105 MMOL/L (ref 98–107)
CHOLEST SERPL-MCNC: 146 MG/DL
CO2 SERPL-SCNC: 25 MMOL/L (ref 22–31)
CREAT SERPL-MCNC: 0.84 MG/DL (ref 0.6–1.1)
EOSINOPHIL # BLD AUTO: 0.1 10E3/UL (ref 0–0.7)
EOSINOPHIL NFR BLD AUTO: 2 %
ERYTHROCYTE [DISTWIDTH] IN BLOOD BY AUTOMATED COUNT: 13 % (ref 10–15)
FASTING STATUS PATIENT QL REPORTED: NO
GFR SERPL CREATININE-BSD FRML MDRD: 64 ML/MIN/1.73M2
GLUCOSE BLD-MCNC: 71 MG/DL (ref 70–125)
HCT VFR BLD AUTO: 39.2 % (ref 35–47)
HDLC SERPL-MCNC: 50 MG/DL
HGB BLD-MCNC: 12.8 G/DL (ref 11.7–15.7)
IMM GRANULOCYTES # BLD: 0 10E3/UL
IMM GRANULOCYTES NFR BLD: 0 %
INR BLD: 2.8 (ref 0.9–1.1)
LDLC SERPL CALC-MCNC: 79 MG/DL
LEVETIRACETAM (KEPPRA): >100 UG/ML (ref 6–46)
LYMPHOCYTES # BLD AUTO: 2 10E3/UL (ref 0.8–5.3)
LYMPHOCYTES NFR BLD AUTO: 28 %
MAGNESIUM SERPL-MCNC: 1.8 MG/DL (ref 1.8–2.6)
MCH RBC QN AUTO: 30.5 PG (ref 26.5–33)
MCHC RBC AUTO-ENTMCNC: 32.7 G/DL (ref 31.5–36.5)
MCV RBC AUTO: 93 FL (ref 78–100)
MONOCYTES # BLD AUTO: 0.6 10E3/UL (ref 0–1.3)
MONOCYTES NFR BLD AUTO: 9 %
NEUTROPHILS # BLD AUTO: 4.2 10E3/UL (ref 1.6–8.3)
NEUTROPHILS NFR BLD AUTO: 61 %
PLATELET # BLD AUTO: 171 10E3/UL (ref 150–450)
POTASSIUM BLD-SCNC: 3.7 MMOL/L (ref 3.5–5)
PROT SERPL-MCNC: 7 G/DL (ref 6–8)
RBC # BLD AUTO: 4.2 10E6/UL (ref 3.8–5.2)
SODIUM SERPL-SCNC: 143 MMOL/L (ref 136–145)
TRIGL SERPL-MCNC: 87 MG/DL
WBC # BLD AUTO: 6.9 10E3/UL (ref 4–11)

## 2021-11-09 PROCEDURE — 80177 DRUG SCRN QUAN LEVETIRACETAM: CPT | Performed by: FAMILY MEDICINE

## 2021-11-09 PROCEDURE — 80053 COMPREHEN METABOLIC PANEL: CPT | Performed by: FAMILY MEDICINE

## 2021-11-09 PROCEDURE — 85610 PROTHROMBIN TIME: CPT | Performed by: FAMILY MEDICINE

## 2021-11-09 PROCEDURE — 36415 COLL VENOUS BLD VENIPUNCTURE: CPT | Performed by: FAMILY MEDICINE

## 2021-11-09 PROCEDURE — 83735 ASSAY OF MAGNESIUM: CPT | Performed by: FAMILY MEDICINE

## 2021-11-09 PROCEDURE — 85025 COMPLETE CBC W/AUTO DIFF WBC: CPT | Performed by: FAMILY MEDICINE

## 2021-11-09 PROCEDURE — 80061 LIPID PANEL: CPT | Performed by: FAMILY MEDICINE

## 2021-11-09 PROCEDURE — 99215 OFFICE O/P EST HI 40 MIN: CPT | Performed by: FAMILY MEDICINE

## 2021-11-09 NOTE — LETTER
November 10, 2021      Elvira Bush  [address]  [facility] [address]        Dear ,    We are writing to inform you of your test results.    Your lab results look great.  The Keppra level appears high but I do not think that the timing of the test was correct.  A Keppra level is supposed to be taken in the morning before taking the medication.  That is why your level looks high.  Your magnesium is just into the normal range, so I think you need to continue supplementing.  I am very happy with these lab results.  See you in 6 months.    Resulted Orders   Keppra (Levetiracetam) Level   Result Value Ref Range    Levetiracetam >100.0 (H) 6.0 - 46.0 ug/mL      Comment:      Suggested Trough Concentrations: 6.0 - 46.0 ug/mL   Lipid panel reflex to direct LDL Fasting   Result Value Ref Range    Cholesterol 146 <=199 mg/dL    Triglycerides 87 <=149 mg/dL    Direct Measure HDL 50 >=50 mg/dL      Comment:      HDL Cholesterol Reference Range:     0-2 years:   No reference ranges established for patients under 2 years old  at [employer] for lipid analytes.    2-8 years:  Greater than 45 mg/dL     18 years and older:   Female: Greater than or equal to 50 mg/dL   Male:   Greater than or equal to 40 mg/dL    LDL Cholesterol Calculated 79 <=129 mg/dL    Patient Fasting > 8hrs? No    Comprehensive metabolic panel (BMP + Alb, Alk Phos, ALT, AST, Total. Bili, TP)   Result Value Ref Range    Sodium 143 136 - 145 mmol/L    Potassium 3.7 3.5 - 5.0 mmol/L    Chloride 105 98 - 107 mmol/L    Carbon Dioxide (CO2) 25 22 - 31 mmol/L    Anion Gap 13 5 - 18 mmol/L    Urea Nitrogen 20 8 - 28 mg/dL    Creatinine 0.84 0.60 - 1.10 mg/dL    Calcium 9.7 8.5 - 10.5 mg/dL    Glucose 71 70 - 125 mg/dL    Alkaline Phosphatase 68 45 - 120 U/L    AST 29 0 - 40 U/L    ALT 24 0 - 45 U/L    Protein Total 7.0 6.0 - 8.0 g/dL    Albumin 3.8 3.5 - 5.0 g/dL    Bilirubin Total 0.4 0.0 - 1.0 mg/dL    GFR Estimate 64 >60  mL/min/1.73m2      Comment:      As of July 11, 2021, eGFR is calculated by the CKD-EPI creatinine equation, without race adjustment. eGFR can be influenced by muscle mass, exercise, and diet. The reported eGFR is an estimation only and is only applicable if the renal function is stable.   Magnesium   Result Value Ref Range    Magnesium 1.8 1.8 - 2.6 mg/dL   CBC with platelets and differential   Result Value Ref Range    WBC Count 6.9 4.0 - 11.0 10e3/uL    RBC Count 4.20 3.80 - 5.20 10e6/uL    Hemoglobin 12.8 11.7 - 15.7 g/dL    Hematocrit 39.2 35.0 - 47.0 %    MCV 93 78 - 100 fL    MCH 30.5 26.5 - 33.0 pg    MCHC 32.7 31.5 - 36.5 g/dL    RDW 13.0 10.0 - 15.0 %    Platelet Count 171 150 - 450 10e3/uL    % Neutrophils 61 %    % Lymphocytes 28 %    % Monocytes 9 %    % Eosinophils 2 %    % Basophils 0 %    % Immature Granulocytes 0 %    Absolute Neutrophils 4.2 1.6 - 8.3 10e3/uL    Absolute Lymphocytes 2.0 0.8 - 5.3 10e3/uL    Absolute Monocytes 0.6 0.0 - 1.3 10e3/uL    Absolute Eosinophils 0.1 0.0 - 0.7 10e3/uL    Absolute Basophils 0.0 0.0 - 0.2 10e3/uL    Absolute Immature Granulocytes 0.0 <=0.0 10e3/uL       If you have any questions or concerns, please call the clinic at the number listed above.       Sincerely,      Loli Alberts MD

## 2021-11-09 NOTE — PROGRESS NOTES
ANTICOAGULATION MANAGEMENT     Elvira Bush 81 year old female is on warfarin with therapeutic INR result. (Goal INR 2.0-3.0)    Recent labs: (last 7 days)     11/09/21  1216   INR 2.8*       ASSESSMENT     Source(s): Chart Review and Patient/Caregiver Call       Warfarin doses taken: Warfarin taken as instructed    Diet: No new diet changes identified    New illness, injury, or hospitalization: No    Medication/supplement changes: None noted    Signs or symptoms of bleeding or clotting: No    Previous INR: Supratherapeutic    Additional findings: None     PLAN     Recommended plan for no diet, medication or health factor changes affecting INR     Dosing Instructions: Continue your current warfarin dose with next INR in 2 weeks       Summary  As of 11/9/2021    Full warfarin instructions:  2.5 mg every Tue, Fri; 5 mg all other days   Next INR check:  11/23/2021             Telephone call with Elvira who verbalizes understanding and agrees to plan    Lab visit scheduled    Education provided: Importance of therapeutic range, Importance of following up at instructed interval and Importance of taking warfarin as instructed    Plan made per [facility] anticoagulation protocol    Santa Shultz RN  Anticoagulation Clinic  11/9/2021    _______________________________________________________________________     Anticoagulation Episode Summary     Current INR goal:  2.0-3.0   TTR:  59.5 % (1 y)   Target end date:  Indefinite   Send INR reminders to:  LAINE ENRIQUEZ       Comments:           Anticoagulation Care Providers     Provider Role Specialty Phone number    Loli Alberts MD Referring Family Medicine [phone]

## 2021-11-18 ENCOUNTER — TELEPHONE (OUTPATIENT)
Dept: FAMILY MEDICINE | Facility: CLINIC | Age: 81
End: 2021-11-18
Payer: MEDICARE

## 2021-11-18 NOTE — TELEPHONE ENCOUNTER
Reason for Call:  Other REFERRAL TO VASCULAR     Detailed comments: pt states Dr. Alberts told her to call back about arm/elbow and next step, possible referral to vascular?  Would like to speak to Dr. Alberts.    Phone Number Patient can be reached at: Home number on file [phone] (home)    Best Time: anytime    Can we leave a detailed message on this number? YES    Call taken on 11/18/2021 at 8:49 AM by Triston Carter

## 2021-11-19 ENCOUNTER — TELEPHONE (OUTPATIENT)
Dept: OTHER | Facility: CLINIC | Age: 81
End: 2021-11-19
Payer: MEDICARE

## 2021-11-19 NOTE — TELEPHONE ENCOUNTER
Patient called and stated that she is still waiting for a return call from PCP. TC relayed message for patient that PCP is trying to find patient a new lymphedema specialist and someone will call her back with the information.     Maty Richards

## 2021-11-19 NOTE — TELEPHONE ENCOUNTER
[facility] Lymphedema providers contact number : [phone]  [place] location primarily however they travel to [place] location.    Pt notified.

## 2021-11-19 NOTE — TELEPHONE ENCOUNTER
Pt referred to [facility] by Loli Alberts MD for LUE lymphedema.    Patient has no recent imaging.    Pt needs to be scheduled for in-person consult with vascular medicine.  Will route to scheduling to coordinate an appointment at next available .    Isabela LOCKWOOD, RN    [facility]  Vascular [facility]  Office: [phone]  Fax: [phone]

## 2021-11-20 NOTE — PROGRESS NOTES
SUBJECTIVE: Elvira Bush is a 81 year old White female who presents today with her daughter for a med check.  I have reviewed notes/visits/labs/studies since I last saw her.  I last saw her about 6 months ago for a preop for right breast biopsy which ended up being benign.  She has had a history of breast pain during the past but it was over 7 years ago and when she saw Nabor Masters in August he told her to follow-up with me.  She has a meningioma which was believed to have triggered epilepsy in her.  She has seen the [facility] neurologist who has her on Keppra 1250 mg a.m. and p.m. needs a level and some labs done.  She has not had any seizures.  She does have atrial fibrillation and is treated with Coumadin and needs an INR today.  She has stopped her aspirin which I think is okay.  She has hypertension which is well controlled today on her hydrochlorothiazide 25 mg, atenolol 100 mg, and lisinopril 20 mg.  She has mild chronic kidney disease that we are watching.  She also has hyperlipidemia, last LDL was 87 and she is on Lipitor 20 mg.  She has osteopenia with her last DEXA scan 2 years ago.  She used to be on a bisphosphonate but is no longer on this medication.  We discussed whether she needs a recheck of her DEXA scan.    She complains today of worsening left arm lymphedema.  She has had worsening carpal tunnel syndrome which I think is related.  She has seen Dr. Guzman who has given her a cortisone injection.  Apparently she has surgery tomorrow.  We discussed she can treat this with a sleeve etc. and that is likely related to her previous breast cancer surgery/treatment.  She had not had problem with it like she is now.  We discussed seeing a lymphedema specialist.    OBJECTIVE: /80   Pulse 74   Wt 52.2 kg (115 lb)   Breastfeeding No   BMI 20.37 kg/m    General: Well-appearing normal weight octogenarian in no acute distress  Heart: Irregularly irregular  Lungs: Clear  bilaterally  Abdomen: Soft, nontender  Extremities: Lower extremities are warm, dry and without edema, left upper extremity shows lymphedema from her shoulder to her fingers      ASSESSMENT & PLAN:     Localization-related focal epilepsy with simple partial seizures (H)  Sees neurologist who has her on Keppra.  Will do labs.  - Keppra (Levetiracetam) Level  - CBC with platelets and differential  - Magnesium  - Keppra (Levetiracetam) Level  - CBC with platelets and differential  - Magnesium    Meningioma (H)  Seen on MRI, possible reason for above?    Chronic atrial fibrillation (H)  On warfarin and needs INR check.  - INR point of care    Essential hypertension  Well-controlled on current regimen, will check labs.  - Comprehensive metabolic panel (BMP + Alb, Alk Phos, ALT, AST, Total. Bili, TP)  - Comprehensive metabolic panel (BMP + Alb, Alk Phos, ALT, AST, Total. Bili, TP)    Chronic kidney disease, stage 3a (H)  Mild, will check.    Essential Hypercholesterolemia  Controlled, will recheck labs  - Lipid panel reflex to direct LDL Fasting  - Lipid panel reflex to direct LDL Fasting    Osteopenia  No longer treating other than supplementing with vitamin D centimeters.  Will put off bone density scan.    Lymphedema of left upper extremity  Likely secondary to her breast cancer treatment.  Why it is worse now I am not sure.  I will send her to a vascular medicine specialist for lymphedema.  - Vascular Medicine Referral      I will get back to her on her lab results by mail and only call with grossly abnormal values.  She should get her third Covid vaccine.  She can follow-up in 6 months for her annual wellness visit.    I spent more than 40 minutes in this appt, examining the pt, reviewing the labs and studies, reviewing notes from chart, discussing treatment options with risks and benefits and coordinating care. >50 % clinic time was spent in face to face counseling and coordinating care      Patient Active Problem  List   Diagnosis     Osteopenia     Esophageal reflux     Essential Hypercholesterolemia     Essential hypertension     Headache     Elevated LFTs     Meningioma (H)     Chronic atrial fibrillation (H)     Convulsions, unspecified convulsion type (H)     Encephalomalacia on imaging study     Localization-related focal epilepsy with simple partial seizures (H)     History of left breast cancer     Chronic kidney disease, stage 3a (H)       Current Outpatient Medications   Medication     atenoloL (TENORMIN) 100 MG tablet     atorvastatin (LIPITOR) 20 MG tablet     calcium-vitamin D (CALCIUM-VITAMIN D) 500 mg(1,250mg) -200 unit per tablet     calcium-vitamin D 500 mg(1,250mg) -200 unit per tablet     hydrochlorothiazide (HYDRODIURIL) 25 MG tablet     levETIRAcetam (KEPPRA) 500 MG tablet     lisinopril (ZESTRIL) 20 MG tablet     magnesium oxide (MAG-OX) 400 mg tablet     multivitamin (MULTIVITAMIN) per tablet     omeprazole (PRILOSEC) 20 MG DR capsule     warfarin ANTICOAGULANT (COUMADIN) 5 MG tablet     aspirin 81 MG tablet     No current facility-administered medications for this visit.                 Answers for HPI/ROS submitted by the patient on 11/9/2021  How many servings of fruits and vegetables do you eat daily?: 0-1  On average, how many sweetened beverages do you drink each day (Examples: soda, juice, sweet tea, etc.  Do NOT count diet or artificially sweetened beverages)?: 1  How many minutes a day do you exercise enough to make your heart beat faster?: 10 to 19  How many days a week do you exercise enough to make your heart beat faster?: 3 or less  How many days per week do you miss taking your medication?: 0

## 2021-11-22 DIAGNOSIS — I48.20 CHRONIC ATRIAL FIBRILLATION (H): ICD-10-CM

## 2021-11-23 ENCOUNTER — LAB (OUTPATIENT)
Dept: LAB | Facility: CLINIC | Age: 81
End: 2021-11-23
Payer: MEDICARE

## 2021-11-23 ENCOUNTER — ANTICOAGULATION THERAPY VISIT (OUTPATIENT)
Dept: ANTICOAGULATION | Facility: CLINIC | Age: 81
End: 2021-11-23

## 2021-11-23 DIAGNOSIS — I48.20 CHRONIC ATRIAL FIBRILLATION (H): Chronic | ICD-10-CM

## 2021-11-23 LAB — INR BLD: 1.8 (ref 0.9–1.1)

## 2021-11-23 PROCEDURE — 85610 PROTHROMBIN TIME: CPT

## 2021-11-23 PROCEDURE — 36416 COLLJ CAPILLARY BLOOD SPEC: CPT

## 2021-11-23 NOTE — PROGRESS NOTES
ANTICOAGULATION MANAGEMENT     Elvira Bush 81 year old female is on warfarin with subtherapeutic INR result. (Goal INR 2.0-3.0)    Recent labs: (last 7 days)     11/23/21  1148   INR 1.8*       ASSESSMENT     Source(s): Chart Review and Template       Warfarin doses taken: Warfarin taken as instructed    Diet: No new diet changes identified    New illness, injury, or hospitalization: No    Medication/supplement changes: None noted    Signs or symptoms of bleeding or clotting: No    Previous INR: Therapeutic last visit; previously outside of goal range    Additional findings: None     PLAN     Recommended plan for no diet, medication or health factor changes affecting INR     Dosing Instructions:  Increase your warfarin dose (8% change) with next INR in 2 weeks       Summary  As of 11/23/2021    Full warfarin instructions:  2.5 mg every Fri; 5 mg all other days   Next INR check:  12/7/2021             Telephone call with Elvira who verbalizes understanding and agrees to plan    Patient elected to schedule next visit 12/9    Education provided: Importance of therapeutic range and Importance of following up at instructed interval    Plan made per [facility] anticoagulation protocol    Santa Shultz RN  Anticoagulation Clinic  11/23/2021    _______________________________________________________________________     Anticoagulation Episode Summary     Current INR goal:  2.0-3.0   TTR:  61.6 % (1 y)   Target end date:  Indefinite   Send INR reminders to:  LAINE ENRIQUEZ       Comments:           Anticoagulation Care Providers     Provider Role Specialty Phone number    Loli Alberts MD Referring Family Medicine [phone]

## 2021-11-24 RX ORDER — WARFARIN SODIUM 5 MG/1
2.5-5 TABLET ORAL DAILY
Qty: 90 TABLET | Refills: 1 | Status: SHIPPED | OUTPATIENT
Start: 2021-11-24 | End: 2022-01-01

## 2021-11-24 NOTE — TELEPHONE ENCOUNTER
Lab Results   Component Value Date    INR 1.8 (H) 11/23/2021    INR 2.8 (H) 11/09/2021    INR 3.2 (H) 10/26/2021       Patient's current Warfarin doses:    2.5 mg every Fri; 5 mg all other days           Next INR check is on 12/7/2021      Patient's last OV with PCP  was on 11/9/2021    Warfarin prescription 3 month supply and one refill sent to patient's pharmacy today.      Santa Shultz RN [facility]    [phone]

## 2021-11-24 NOTE — TELEPHONE ENCOUNTER
"Routing refill request to provider for review/approval because:  INR not in range    Last Written Prescription Date:  8/30/21  Last Fill Quantity: 90,  # refills: 0   Last office visit provider:  11/9/21    Requested Prescriptions   Pending Prescriptions Disp Refills     warfarin ANTICOAGULANT (COUMADIN) 5 MG tablet [Pharmacy Med Name: WARFARIN SODIUM 5 MG TABLET] 90 tablet 0     Sig: TAKE 1 TABLET BY MOUTH EVERY DAY       Vitamin K Antagonists Failed - 11/22/2021 12:30 AM        Failed - INR is within goal in the past 6 weeks     Confirm INR is within goal in the past 6 weeks.     Recent Labs   Lab Test 11/23/21  1148   INR 1.8*                       Passed - Recent (12 mo) or future (30 days) visit within the authorizing provider's specialty     Patient has had an office visit with the authorizing provider or a provider within the authorizing providers department within the previous 12 mos or has a future within next 30 days. See \"Patient Info\" tab in inbasket, or \"Choose Columns\" in Meds & Orders section of the refill encounter.              Passed - Medication is active on med list        Passed - Patient is 18 years of age or older        Passed - Patient is not pregnant        Passed - No positive pregnancy on file in past 12 months             Lilli Alvarez RN 11/23/21 6:59 PM  "

## 2021-11-26 DIAGNOSIS — I10 ESSENTIAL HYPERTENSION: ICD-10-CM

## 2021-11-26 RX ORDER — ATENOLOL 100 MG/1
100 TABLET ORAL DAILY
Qty: 90 TABLET | Refills: 3 | Status: SHIPPED | OUTPATIENT
Start: 2021-11-26 | End: 2022-01-01

## 2021-11-26 NOTE — TELEPHONE ENCOUNTER
"Last Written Prescription Date:  12/18/20  Last Fill Quantity: 90,  # refills: 3   Last office visit provider:  11/9/21     Requested Prescriptions   Pending Prescriptions Disp Refills     atenolol (TENORMIN) 100 MG tablet [Pharmacy Med Name: ATENOLOL 100 MG TABLET] 90 tablet 3     Sig: TAKE 1 TABLET BY MOUTH EVERY DAY       Beta-Blockers Protocol Passed - 11/26/2021 12:26 AM        Passed - Blood pressure under 140/90 in past 12 months     BP Readings from Last 3 Encounters:   11/09/21 130/80   08/02/21 (!) 179/105   04/15/21 (!) 142/84                 Passed - Patient is age 6 or older        Passed - Recent (12 mo) or future (30 days) visit within the authorizing provider's specialty     Patient has had an office visit with the authorizing provider or a provider within the authorizing providers department within the previous 12 mos or has a future within next 30 days. See \"Patient Info\" tab in inbasket, or \"Choose Columns\" in Meds & Orders section of the refill encounter.              Passed - Medication is active on med list             Nabor Landon RN 11/26/21 11:37 AM  "

## 2021-12-07 ENCOUNTER — OFFICE VISIT (OUTPATIENT)
Dept: OTHER | Facility: CLINIC | Age: 81
End: 2021-12-07
Attending: INTERNAL MEDICINE
Payer: MEDICARE

## 2021-12-07 ENCOUNTER — TELEPHONE (OUTPATIENT)
Dept: OTHER | Facility: CLINIC | Age: 81
End: 2021-12-07

## 2021-12-07 VITALS
BODY MASS INDEX: 19.63 KG/M2 | RESPIRATION RATE: 16 BRPM | SYSTOLIC BLOOD PRESSURE: 160 MMHG | OXYGEN SATURATION: 98 % | HEIGHT: 64 IN | WEIGHT: 115 LBS | DIASTOLIC BLOOD PRESSURE: 112 MMHG | HEART RATE: 81 BPM

## 2021-12-07 DIAGNOSIS — M79.89 LEFT ARM SWELLING: Primary | ICD-10-CM

## 2021-12-07 PROCEDURE — G0463 HOSPITAL OUTPT CLINIC VISIT: HCPCS

## 2021-12-07 PROCEDURE — 99205 OFFICE O/P NEW HI 60 MIN: CPT | Performed by: INTERNAL MEDICINE

## 2021-12-07 ASSESSMENT — MIFFLIN-ST. JEOR: SCORE: 961.64

## 2021-12-07 NOTE — TELEPHONE ENCOUNTER
Follow up to 12/7/21    Please arrange for:      JIMI Venous US in 4-6 weeks    Follow up in clinic after completion.    Tricia Hope RN BSN  [facility] Vascular [facility]  [phone]

## 2021-12-07 NOTE — PROGRESS NOTES
INITIAL VASCULAR MEDICINE EVALUATION  REFERRING MD: Loli Alberts MD   REASON FOR CONSULT: LUE LYMPHEDEMA IN A BREAST CANCER SURVIVOR S/P LT BREAST LUMPECTOMY AND SENTINEL LYMPH NODE BIOPSY 3/25/2014 AND LT BREAST BIOPSY TIMES TWO (BENIGN) 4/3/2021    HPI: Elvira Bush is a 81 year old female on warfarin AC for AF who is currently seven years S/P Lt breast lumpectomy and Lt axillary sentinel lymph node biopsy. Since that time, she noted the immediate development of LUE swelling. There is noticeable asymmetry between the arms. She notes she would like it to go away as this is bothersome for her. She has not worn an appropriately fitted compression sleeve. She does not have a known h/o DVT in the LUE. She does not lift her arms above her head or shoulders repetitively as she has limited ROM. She had a carpal tunnel release undertaken w/o relief of compressive neuropathic sxs.     Review Of Systems  Skin: negative  Eyes: negative  Ears/Nose/Throat: negative  Respiratory: No shortness of breath, dyspnea on exertion, cough, or hemoptysis  Cardiovascular: negative  Gastrointestinal: negative  Genitourinary: negative  Musculoskeletal: as above  Neurologic: negative  Psychiatric: negative  Hematologic/Lymphatic/Immunologic: negative  Endocrine: negative      PAST MEDICAL HISTORY:                  Past Medical History:   Diagnosis Date     Acute respiratory failure, unspecified whether with hypoxia or hypercapnia (H)      Atrial fibrillation (H)      Breast cancer (H) 3-2014     Breast cancer of lower-outer quadrant of left female breast (H)     Created by Conversion      Closed displaced fracture of left pubis, initial encounter (H)      Closed fracture of spinous process of lumbar vertebra (H) 9/9/2017     Encephalopathy      GERD (gastroesophageal reflux disease)      HTN (hypertension)      Hx of radiation therapy 5-2014    Left Breast     Hypercholesterolemia      Intraabdominal hemorrhage 9/9/2017      Osteopenia      Pelvic fracture (H) 9/9/2017     Seizures (H)      Skin cancer      Transient alteration of awareness        PAST SURGICAL HISTORY:                  Past Surgical History:   Procedure Laterality Date     ARTHROSCOPY KNEE Right 2009     BIOPSY BREAST Left 4/21/2021    Procedure: LEFT BREAST BIOPSY TIMES 2;  Surgeon: Didier Durán MD;  Location: [facility];  Service: General     LUMPECTOMY BREAST Left 3-2014     [facility]  7/27/2017          SKIN CANCER EXCISION       TENDON RELEASE         CURRENT MEDICATIONS:                  Current Outpatient Medications   Medication Sig Dispense Refill     atenolol (TENORMIN) 100 MG tablet Take 1 tablet (100 mg) by mouth daily 90 tablet 3     atorvastatin (LIPITOR) 20 MG tablet TAKE 1 TABLET BY MOUTH EVERYDAY AT BEDTIME 90 tablet 0     calcium-vitamin D (CALCIUM-VITAMIN D) 500 mg(1,250mg) -200 unit per tablet [CALCIUM-VITAMIN D (CALCIUM-VITAMIN D) 500 MG(1,250MG) -200 UNIT PER TABLET] Take 2 tablets by mouth every morning.        calcium-vitamin D 500 mg(1,250mg) -200 unit per tablet [CALCIUM-VITAMIN D 500 MG(1,250MG) -200 UNIT PER TABLET] Take 1 tablet by mouth at bedtime.       hydrochlorothiazide (HYDRODIURIL) 25 MG tablet TAKE 1 TABLET BY MOUTH EVERY DAY 90 tablet 0     levETIRAcetam (KEPPRA) 500 MG tablet [LEVETIRACETAM (KEPPRA) 500 MG TABLET] Take 1,250 mg by mouth 2 (two) times a day.        lisinopril (ZESTRIL) 20 MG tablet TAKE 1 TABLET BY MOUTH EVERY DAY 90 tablet 0     magnesium oxide (MAG-OX) 400 mg tablet [MAGNESIUM OXIDE (MAG-OX) 400 MG TABLET] Take 1 tablet (400 mg total) by mouth daily.  0     multivitamin (MULTIVITAMIN) per tablet [MULTIVITAMIN (MULTIVITAMIN) PER TABLET] Take 1 tablet by mouth daily.       omeprazole (PRILOSEC) 20 MG DR capsule TAKE 1 CAPSULE BY MOUTH EVERY DAY 90 capsule 2     warfarin ANTICOAGULANT (COUMADIN) 5 MG tablet Take 0.5-1 tablets (2.5-5 mg) by mouth daily Adjust dose per INR results as instructed. 90 tablet 1      aspirin 81 MG tablet Take 81 mg by mouth every evening  (Patient not taking: Reported on 12/7/2021)         ALLERGIES:                No Known Allergies    SOCIAL HISTORY:                  Social History     Socioeconomic History     Marital status:      Spouse name: Not on file     Number of children: Not on file     Years of education: Not on file     Highest education level: Not on file   Occupational History     Not on file   Tobacco Use     Smoking status: Never Smoker     Smokeless tobacco: Never Used   Substance and Sexual Activity     Alcohol use: Yes     Alcohol/week: 2.0 standard drinks     Comment: Alcoholic Drinks/day: no alcohol this summer     Drug use: No     Sexual activity: Not Currently   Other Topics Concern     Not on file   Social History Narrative     Not on file     Social Determinants of Health     Financial Resource Strain: Not on file   Food Insecurity: Not on file   Transportation Needs: Not on file   Physical Activity: Not on file   Stress: Not on file   Social Connections: Not on file   Intimate Partner Violence: Not on file   Housing Stability: Not on file       FAMILY HISTORY:                   Family History   Problem Relation Age of Onset     Coronary Artery Disease Mother      No Known Problems Daughter      Cerebrovascular Disease Maternal Grandmother      Prostate Cancer Maternal Grandfather      Cerebral aneurysm Maternal Aunt      No Known Problems Paternal Aunt      Skin Cancer Son      Hereditary Breast and Ovarian Cancer Syndrome No family hx of      Breast Cancer No family hx of      Cancer No family hx of      Colon Cancer No family hx of      Endometrial Cancer No family hx of      Ovarian Cancer No family hx of          Physical exam Reveals:    O/P: WNL  HEENT: WNL  NECK: No JVD, thyromegaly, or lymphadenopathy  HEART: irr irr, no murmurs, gallops, or rubs  LUNGS: CTA bilaterally without rales, wheezes, or rhonchi  GI: NABS, nondistended, nontender,  soft  EXT:without cyanosis, clubbing, or edema except LUE which has 2 plus edema at the level of the forearm proximally. There is a negative Stemmer sign. She has SQ collateral venous vasodilation on the proximal LUE and chest wall not present contralaterally. She can not lift her arms above her head. She has limited range of motion to rotational adduction or abduction bilaterally. She has an obvious lipoma on the medial aspect of the left arm at the level of the elbow.   Right arm circumference 10 cm proximal to elbow: 21.5 cm  Left arm circumference 10 cm proximal to elbow: 28.0 cm  NEURO: nonfocal  : no flank tenderness    A/P:      (M79.89) Left arm swelling  (primary encounter diagnosis)  Comment: She has some but not all clinical features associated with lymphedema. Given her venous collaterals, one wonders about TOS playing a role herein, but she does not lift her arm above her head. I advised she wear a compressive sleeve to the axilla, RTC in four to six weeks. Obtain a LUE venous duplex w/o maneuvers one week before to rule out concurrent venous ds.  Plan: As above        70 minutes medical care on today's date.

## 2021-12-07 NOTE — PROGRESS NOTES
"[facility]        Patient is here for a consult to discuss Lymphedema. The patient states he/she does not wear compressions. Swelling is observed in left upper extremity.      BP (!) 160/112 (BP Location: Left arm, Patient Position: Chair, Cuff Size: Adult Regular)   Pulse 81   Resp 16   Ht 5' 4\" (1.626 m)   Wt 115 lb (52.2 kg)   SpO2 98%   BMI 19.74 kg/m      The provider has been notified that the patient has concerns of about her left arm/pain/swelling.     Questions patient would like addressed today are: N/A.    Refills are needed: No    Has homecare services and agency name:  Gemma Helm CMA      "

## 2021-12-09 ENCOUNTER — LAB (OUTPATIENT)
Dept: LAB | Facility: CLINIC | Age: 81
End: 2021-12-09
Payer: MEDICARE

## 2021-12-09 ENCOUNTER — ANTICOAGULATION THERAPY VISIT (OUTPATIENT)
Dept: ANTICOAGULATION | Facility: CLINIC | Age: 81
End: 2021-12-09

## 2021-12-09 DIAGNOSIS — I48.20 CHRONIC ATRIAL FIBRILLATION (H): Chronic | ICD-10-CM

## 2021-12-09 LAB — INR BLD: 2.3 (ref 0.9–1.1)

## 2021-12-09 PROCEDURE — 36415 COLL VENOUS BLD VENIPUNCTURE: CPT

## 2021-12-09 PROCEDURE — 85610 PROTHROMBIN TIME: CPT

## 2021-12-09 NOTE — TELEPHONE ENCOUNTER
Called Pat and she was in a store and will call back when she gets home.  Patient needs: Follow up to 12/7/21  Please arrange for:    JIMI Venous US in 4-6 weeks  Follow up in clinic after completion.

## 2021-12-09 NOTE — PROGRESS NOTES
ANTICOAGULATION MANAGEMENT     Elvira Bush 81 year old female is on warfarin with therapeutic INR result. (Goal INR 2.0-3.0)    Recent labs: (last 7 days)     12/09/21  1217   INR 2.3*       ASSESSMENT     Source(s): Chart Review, Patient/Caregiver Call and Template       Warfarin doses taken: Warfarin taken differently, but did not change total weekly dose    Diet: No new diet changes identified    New illness, injury, or hospitalization: No    Medication/supplement changes: None noted    Signs or symptoms of bleeding or clotting: No    Previous INR: Subtherapeutic    Additional findings: None     PLAN     Recommended plan for no diet, medication or health factor changes affecting INR     Dosing Instructions: Continue your current warfarin dose with next INR in 2 weeks       Summary  As of 12/9/2021    Full warfarin instructions:  2.5 mg every Tue; 5 mg all other days   Next INR check:  12/23/2021             Telephone call with Elvira who verbalizes understanding and agrees to plan    Lab visit scheduled    Education provided: Importance of therapeutic range and Importance of following up at instructed interval    Plan made per [facility] anticoagulation protocol    Santa Shultz RN  Anticoagulation Clinic  12/9/2021    _______________________________________________________________________     Anticoagulation Episode Summary     Current INR goal:  2.0-3.0   TTR:  64.2 % (1 y)   Target end date:  Indefinite   Send INR reminders to:  LAINE ENRIQUEZ       Comments:           Anticoagulation Care Providers     Provider Role Specialty Phone number    Loli Alberts MD Referring Family Medicine [phone]

## 2021-12-23 NOTE — PROGRESS NOTES
ANTICOAGULATION MANAGEMENT     Elvira Bush 81 year old female is on warfarin with therapeutic INR result. (Goal INR 2.0-3.0)    Recent labs: (last 7 days)     12/23/21  1019   INR 2.6*       ASSESSMENT     Source(s): Chart Review and Template       Warfarin doses taken: Warfarin taken as instructed    Diet: No new diet changes identified    New illness, injury, or hospitalization: No    Medication/supplement changes: None noted    Signs or symptoms of bleeding or clotting: No    Previous INR: Therapeutic last visit; previously outside of goal range    Additional findings: None     PLAN     Recommended plan for no diet, medication or health factor changes affecting INR     Dosing Instructions: Continue your current warfarin dose with next INR in 4 weeks       Summary  As of 12/23/2021    Full warfarin instructions:  2.5 mg every Tue; 5 mg all other days   Next INR check:  1/20/2022             Telephone call with Elvira who verbalizes understanding and agrees to plan    Lab visit scheduled    Education provided: Importance of therapeutic range and Contact [phone] with any changes, questions or concerns.     Plan made per [facility] anticoagulation protocol    Santa Shultz RN  Anticoagulation Clinic  12/23/2021    _______________________________________________________________________     Anticoagulation Episode Summary     Current INR goal:  2.0-3.0   TTR:  67.4 % (1 y)   Target end date:  Indefinite   Send INR reminders to:  LAINE ENRIQUEZ       Comments:           Anticoagulation Care Providers     Provider Role Specialty Phone number    Lloi Alberts MD Referring Family Medicine [phone]

## 2022-01-01 ENCOUNTER — ANTICOAGULATION THERAPY VISIT (OUTPATIENT)
Dept: ANTICOAGULATION | Facility: CLINIC | Age: 82
End: 2022-01-01

## 2022-01-01 ENCOUNTER — LAB (OUTPATIENT)
Dept: LAB | Facility: CLINIC | Age: 82
End: 2022-01-01
Payer: MEDICARE

## 2022-01-01 ENCOUNTER — HOSPITAL ENCOUNTER (OUTPATIENT)
Dept: PHYSICAL THERAPY | Facility: REHABILITATION | Age: 82
Discharge: HOME | End: 2022-07-25
Payer: MEDICARE

## 2022-01-01 ENCOUNTER — MEDICAL CORRESPONDENCE (OUTPATIENT)
Dept: FAMILY MEDICINE | Facility: CLINIC | Age: 82
End: 2022-01-01

## 2022-01-01 ENCOUNTER — HOSPITAL ENCOUNTER (OUTPATIENT)
Dept: PHYSICAL THERAPY | Facility: REHABILITATION | Age: 82
Discharge: HOME | End: 2022-05-11
Payer: MEDICARE

## 2022-01-01 ENCOUNTER — DOCUMENTATION ONLY (OUTPATIENT)
Dept: ANTICOAGULATION | Facility: CLINIC | Age: 82
End: 2022-01-01

## 2022-01-01 ENCOUNTER — MEDICAL CORRESPONDENCE (OUTPATIENT)
Dept: HEALTH INFORMATION MANAGEMENT | Facility: CLINIC | Age: 82
End: 2022-01-01

## 2022-01-01 ENCOUNTER — HOSPITAL ENCOUNTER (OUTPATIENT)
Dept: PHYSICAL THERAPY | Facility: REHABILITATION | Age: 82
Discharge: HOME | End: 2022-06-02
Payer: MEDICARE

## 2022-01-01 ENCOUNTER — HOSPITAL ENCOUNTER (OUTPATIENT)
Dept: OCCUPATIONAL THERAPY | Facility: REHABILITATION | Age: 82
Discharge: HOME | End: 2022-08-30
Attending: FAMILY MEDICINE
Payer: MEDICARE

## 2022-01-01 ENCOUNTER — HOSPITAL ENCOUNTER (OUTPATIENT)
Dept: ULTRASOUND IMAGING | Facility: CLINIC | Age: 82
End: 2022-01-13
Attending: INTERNAL MEDICINE
Payer: MEDICARE

## 2022-01-01 ENCOUNTER — PRE VISIT (OUTPATIENT)
Dept: RADIATION ONCOLOGY | Facility: CLINIC | Age: 82
End: 2022-01-01

## 2022-01-01 ENCOUNTER — HOSPITAL ENCOUNTER (OUTPATIENT)
Dept: PHYSICAL THERAPY | Facility: REHABILITATION | Age: 82
Discharge: HOME | End: 2022-05-19
Payer: MEDICARE

## 2022-01-01 ENCOUNTER — HOSPITAL ENCOUNTER (OUTPATIENT)
Dept: OCCUPATIONAL THERAPY | Facility: REHABILITATION | Age: 82
Discharge: HOME | End: 2022-10-11
Payer: MEDICARE

## 2022-01-01 ENCOUNTER — OFFICE VISIT (OUTPATIENT)
Dept: FAMILY MEDICINE | Facility: CLINIC | Age: 82
End: 2022-01-01
Payer: MEDICARE

## 2022-01-01 ENCOUNTER — HOSPITAL ENCOUNTER (OUTPATIENT)
Dept: PHYSICAL THERAPY | Facility: REHABILITATION | Age: 82
Discharge: HOME | End: 2022-05-23
Payer: MEDICARE

## 2022-01-01 ENCOUNTER — ANTICOAGULATION THERAPY VISIT (OUTPATIENT)
Dept: ANTICOAGULATION | Facility: CLINIC | Age: 82
End: 2022-01-01
Payer: MEDICARE

## 2022-01-01 ENCOUNTER — TELEPHONE (OUTPATIENT)
Dept: RADIATION ONCOLOGY | Facility: CLINIC | Age: 82
End: 2022-01-01
Payer: MEDICARE

## 2022-01-01 ENCOUNTER — TELEPHONE (OUTPATIENT)
Dept: FAMILY MEDICINE | Facility: CLINIC | Age: 82
End: 2022-01-01

## 2022-01-01 ENCOUNTER — TELEPHONE (OUTPATIENT)
Dept: FAMILY MEDICINE | Facility: CLINIC | Age: 82
End: 2022-01-01
Payer: MEDICARE

## 2022-01-01 ENCOUNTER — TRANSCRIBE ORDERS (OUTPATIENT)
Dept: VASCULAR SURGERY | Facility: CLINIC | Age: 82
End: 2022-01-01
Payer: MEDICARE

## 2022-01-01 ENCOUNTER — TELEPHONE (OUTPATIENT)
Dept: OTHER | Facility: CLINIC | Age: 82
End: 2022-01-01

## 2022-01-01 ENCOUNTER — HOSPITAL ENCOUNTER (OUTPATIENT)
Dept: PHYSICAL THERAPY | Facility: REHABILITATION | Age: 82
Discharge: HOME | End: 2022-07-13
Payer: MEDICARE

## 2022-01-01 ENCOUNTER — HOSPITAL ENCOUNTER (OUTPATIENT)
Dept: PHYSICAL THERAPY | Facility: REHABILITATION | Age: 82
Discharge: HOME | End: 2022-06-27
Payer: MEDICARE

## 2022-01-01 ENCOUNTER — TRANSFERRED RECORDS (OUTPATIENT)
Dept: HEALTH INFORMATION MANAGEMENT | Facility: CLINIC | Age: 82
End: 2022-01-01
Payer: MEDICARE

## 2022-01-01 ENCOUNTER — NURSE TRIAGE (OUTPATIENT)
Dept: NURSING | Facility: CLINIC | Age: 82
End: 2022-01-01

## 2022-01-01 ENCOUNTER — HOSPITAL ENCOUNTER (OUTPATIENT)
Dept: PHYSICAL THERAPY | Facility: REHABILITATION | Age: 82
Discharge: HOME | End: 2022-05-09
Payer: MEDICARE

## 2022-01-01 ENCOUNTER — TELEPHONE (OUTPATIENT)
Dept: NURSING | Facility: CLINIC | Age: 82
End: 2022-01-01
Payer: MEDICARE

## 2022-01-01 ENCOUNTER — PATIENT OUTREACH (OUTPATIENT)
Dept: ONCOLOGY | Facility: CLINIC | Age: 82
End: 2022-01-01

## 2022-01-01 ENCOUNTER — LAB (OUTPATIENT)
Dept: LAB | Facility: CLINIC | Age: 82
End: 2022-01-01
Payer: OTHER MISCELLANEOUS

## 2022-01-01 ENCOUNTER — OFFICE VISIT (OUTPATIENT)
Dept: RADIATION ONCOLOGY | Facility: CLINIC | Age: 82
End: 2022-01-01
Attending: RADIOLOGY
Payer: MEDICARE

## 2022-01-01 ENCOUNTER — TELEPHONE (OUTPATIENT)
Dept: ONCOLOGY | Facility: HOSPITAL | Age: 82
End: 2022-01-01

## 2022-01-01 ENCOUNTER — HOSPITAL ENCOUNTER (OUTPATIENT)
Dept: PHYSICAL THERAPY | Facility: REHABILITATION | Age: 82
Discharge: HOME | End: 2022-05-26
Payer: MEDICARE

## 2022-01-01 ENCOUNTER — HOSPITAL ENCOUNTER (OUTPATIENT)
Dept: PHYSICAL THERAPY | Facility: REHABILITATION | Age: 82
Discharge: HOME | End: 2022-05-04
Attending: SURGERY
Payer: MEDICARE

## 2022-01-01 ENCOUNTER — HEALTH MAINTENANCE LETTER (OUTPATIENT)
Age: 82
End: 2022-01-01

## 2022-01-01 ENCOUNTER — HOSPITAL ENCOUNTER (OUTPATIENT)
Dept: PHYSICAL THERAPY | Facility: REHABILITATION | Age: 82
Discharge: HOME | End: 2022-08-29
Payer: MEDICARE

## 2022-01-01 ENCOUNTER — HOSPITAL ENCOUNTER (OUTPATIENT)
Dept: PHYSICAL THERAPY | Facility: REHABILITATION | Age: 82
Discharge: HOME | End: 2022-10-10
Payer: MEDICARE

## 2022-01-01 ENCOUNTER — OFFICE VISIT (OUTPATIENT)
Dept: RADIATION ONCOLOGY | Facility: CLINIC | Age: 82
End: 2022-01-01
Attending: INTERNAL MEDICINE
Payer: MEDICARE

## 2022-01-01 ENCOUNTER — HOSPITAL ENCOUNTER (OUTPATIENT)
Dept: PHYSICAL THERAPY | Facility: REHABILITATION | Age: 82
Discharge: HOME | End: 2022-06-14
Payer: MEDICARE

## 2022-01-01 ENCOUNTER — HOSPITAL ENCOUNTER (OUTPATIENT)
Dept: PHYSICAL THERAPY | Facility: REHABILITATION | Age: 82
Discharge: HOME | End: 2022-05-06
Payer: MEDICARE

## 2022-01-01 ENCOUNTER — HOSPITAL ENCOUNTER (OUTPATIENT)
Dept: PHYSICAL THERAPY | Facility: REHABILITATION | Age: 82
Discharge: HOME | End: 2022-08-18
Payer: MEDICARE

## 2022-01-01 ENCOUNTER — OFFICE VISIT (OUTPATIENT)
Dept: OTHER | Facility: CLINIC | Age: 82
End: 2022-01-01
Attending: INTERNAL MEDICINE
Payer: MEDICARE

## 2022-01-01 ENCOUNTER — DOCUMENTATION ONLY (OUTPATIENT)
Dept: OTHER | Facility: CLINIC | Age: 82
End: 2022-01-01

## 2022-01-01 ENCOUNTER — DOCUMENTATION ONLY (OUTPATIENT)
Dept: OTHER | Facility: CLINIC | Age: 82
End: 2022-01-01
Payer: MEDICARE

## 2022-01-01 ENCOUNTER — TRANSCRIBE ORDERS (OUTPATIENT)
Dept: SURGERY | Facility: CLINIC | Age: 82
End: 2022-01-01
Payer: MEDICARE

## 2022-01-01 ENCOUNTER — HOSPITAL ENCOUNTER (OUTPATIENT)
Dept: PHYSICAL THERAPY | Facility: REHABILITATION | Age: 82
Discharge: HOME | End: 2022-08-08
Payer: MEDICARE

## 2022-01-01 VITALS
TEMPERATURE: 97.7 F | OXYGEN SATURATION: 96 % | SYSTOLIC BLOOD PRESSURE: 157 MMHG | BODY MASS INDEX: 19.75 KG/M2 | HEART RATE: 65 BPM | WEIGHT: 108 LBS | DIASTOLIC BLOOD PRESSURE: 91 MMHG | RESPIRATION RATE: 16 BRPM

## 2022-01-01 VITALS
HEIGHT: 64 IN | HEART RATE: 62 BPM | DIASTOLIC BLOOD PRESSURE: 81 MMHG | OXYGEN SATURATION: 100 % | BODY MASS INDEX: 19.63 KG/M2 | SYSTOLIC BLOOD PRESSURE: 148 MMHG | WEIGHT: 115 LBS | RESPIRATION RATE: 16 BRPM

## 2022-01-01 VITALS
OXYGEN SATURATION: 96 % | SYSTOLIC BLOOD PRESSURE: 144 MMHG | WEIGHT: 111 LBS | DIASTOLIC BLOOD PRESSURE: 90 MMHG | HEART RATE: 64 BPM | BODY MASS INDEX: 18.95 KG/M2 | HEIGHT: 64 IN

## 2022-01-01 VITALS
SYSTOLIC BLOOD PRESSURE: 142 MMHG | OXYGEN SATURATION: 97 % | HEART RATE: 76 BPM | DIASTOLIC BLOOD PRESSURE: 80 MMHG | BODY MASS INDEX: 19.22 KG/M2 | WEIGHT: 112 LBS

## 2022-01-01 VITALS
WEIGHT: 111.8 LBS | DIASTOLIC BLOOD PRESSURE: 84 MMHG | HEART RATE: 77 BPM | OXYGEN SATURATION: 98 % | RESPIRATION RATE: 16 BRPM | SYSTOLIC BLOOD PRESSURE: 151 MMHG | TEMPERATURE: 98.1 F | BODY MASS INDEX: 19.49 KG/M2

## 2022-01-01 VITALS
DIASTOLIC BLOOD PRESSURE: 88 MMHG | HEIGHT: 62 IN | OXYGEN SATURATION: 98 % | WEIGHT: 111.4 LBS | TEMPERATURE: 97.6 F | SYSTOLIC BLOOD PRESSURE: 138 MMHG | BODY MASS INDEX: 20.5 KG/M2 | HEART RATE: 51 BPM

## 2022-01-01 VITALS
SYSTOLIC BLOOD PRESSURE: 122 MMHG | DIASTOLIC BLOOD PRESSURE: 88 MMHG | HEART RATE: 61 BPM | BODY MASS INDEX: 19.28 KG/M2 | RESPIRATION RATE: 14 BRPM | WEIGHT: 105.4 LBS | OXYGEN SATURATION: 100 %

## 2022-01-01 VITALS
OXYGEN SATURATION: 100 % | SYSTOLIC BLOOD PRESSURE: 110 MMHG | DIASTOLIC BLOOD PRESSURE: 80 MMHG | HEART RATE: 61 BPM | BODY MASS INDEX: 19.02 KG/M2 | WEIGHT: 104 LBS

## 2022-01-01 DIAGNOSIS — I89.0 LYMPHEDEMA: Primary | ICD-10-CM

## 2022-01-01 DIAGNOSIS — I48.20 CHRONIC ATRIAL FIBRILLATION (H): Primary | ICD-10-CM

## 2022-01-01 DIAGNOSIS — C50.911 MALIGNANT NEOPLASM OF RIGHT FEMALE BREAST, UNSPECIFIED ESTROGEN RECEPTOR STATUS, UNSPECIFIED SITE OF BREAST (H): ICD-10-CM

## 2022-01-01 DIAGNOSIS — C50.919 BREAST CANCER (H): ICD-10-CM

## 2022-01-01 DIAGNOSIS — I89.0 LYMPHEDEMA OF LEFT UPPER EXTREMITY: ICD-10-CM

## 2022-01-01 DIAGNOSIS — I82.A29: ICD-10-CM

## 2022-01-01 DIAGNOSIS — G40.109 LOCALIZATION-RELATED FOCAL EPILEPSY WITH SIMPLE PARTIAL SEIZURES (H): Chronic | ICD-10-CM

## 2022-01-01 DIAGNOSIS — I89.0 LYMPHEDEMA OF LEFT UPPER EXTREMITY: Chronic | ICD-10-CM

## 2022-01-01 DIAGNOSIS — K59.01 SLOW TRANSIT CONSTIPATION: ICD-10-CM

## 2022-01-01 DIAGNOSIS — N18.31 CHRONIC KIDNEY DISEASE, STAGE 3A (H): Primary | ICD-10-CM

## 2022-01-01 DIAGNOSIS — C50.919 METASTATIC BREAST CANCER: Primary | ICD-10-CM

## 2022-01-01 DIAGNOSIS — I89.0 LYMPHEDEMA OF LEFT UPPER EXTREMITY: Primary | ICD-10-CM

## 2022-01-01 DIAGNOSIS — N18.31 CHRONIC KIDNEY DISEASE, STAGE 3A (H): Chronic | ICD-10-CM

## 2022-01-01 DIAGNOSIS — I10 ESSENTIAL HYPERTENSION: ICD-10-CM

## 2022-01-01 DIAGNOSIS — I48.20 CHRONIC ATRIAL FIBRILLATION (H): Chronic | ICD-10-CM

## 2022-01-01 DIAGNOSIS — L03.114 CELLULITIS OF LEFT UPPER EXTREMITY: Primary | ICD-10-CM

## 2022-01-01 DIAGNOSIS — Z23 IMMUNIZATION DUE: ICD-10-CM

## 2022-01-01 DIAGNOSIS — G62.9 PERIPHERAL POLYNEUROPATHY: ICD-10-CM

## 2022-01-01 DIAGNOSIS — I48.20 CHRONIC ATRIAL FIBRILLATION (H): Primary | Chronic | ICD-10-CM

## 2022-01-01 DIAGNOSIS — I82.A22 CHRONIC DEEP VEIN THROMBOSIS (DVT) OF AXILLARY VEIN OF LEFT UPPER EXTREMITY (H): ICD-10-CM

## 2022-01-01 DIAGNOSIS — I48.20 CHRONIC ATRIAL FIBRILLATION (H): ICD-10-CM

## 2022-01-01 DIAGNOSIS — C50.912 RECURRENT BREAST CANCER, LEFT (H): Primary | ICD-10-CM

## 2022-01-01 DIAGNOSIS — C50.912 RECURRENT BREAST CANCER, LEFT (H): ICD-10-CM

## 2022-01-01 DIAGNOSIS — I89.0 LYMPHEDEMA: ICD-10-CM

## 2022-01-01 DIAGNOSIS — E78.00 PURE HYPERCHOLESTEROLEMIA: Chronic | ICD-10-CM

## 2022-01-01 DIAGNOSIS — G89.3 CANCER RELATED PAIN: ICD-10-CM

## 2022-01-01 DIAGNOSIS — L98.9 SKIN LESION: ICD-10-CM

## 2022-01-01 DIAGNOSIS — Z51.5 ENCOUNTER FOR PALLIATIVE CARE: ICD-10-CM

## 2022-01-01 DIAGNOSIS — M79.89 LEFT ARM SWELLING: ICD-10-CM

## 2022-01-01 DIAGNOSIS — I10 ESSENTIAL HYPERTENSION: Chronic | ICD-10-CM

## 2022-01-01 DIAGNOSIS — Z00.00 ENCOUNTER FOR MEDICARE ANNUAL WELLNESS EXAM: Primary | ICD-10-CM

## 2022-01-01 DIAGNOSIS — C50.912 RECURRENT BREAST CANCER, LEFT (H): Chronic | ICD-10-CM

## 2022-01-01 DIAGNOSIS — M79.622 PAIN OF LEFT UPPER ARM: ICD-10-CM

## 2022-01-01 DIAGNOSIS — H61.23 BILATERAL IMPACTED CERUMEN: ICD-10-CM

## 2022-01-01 DIAGNOSIS — D32.9 MENINGIOMA (H): ICD-10-CM

## 2022-01-01 DIAGNOSIS — M75.02 ADHESIVE CAPSULITIS OF LEFT SHOULDER: ICD-10-CM

## 2022-01-01 DIAGNOSIS — R53.0 NEOPLASTIC MALIGNANT RELATED FATIGUE: ICD-10-CM

## 2022-01-01 DIAGNOSIS — Z85.3 HISTORY OF LEFT BREAST CANCER: ICD-10-CM

## 2022-01-01 DIAGNOSIS — R23.8 SKIN IRRITATION: ICD-10-CM

## 2022-01-01 DIAGNOSIS — Z78.9 DECREASED ACTIVITIES OF DAILY LIVING (ADL): ICD-10-CM

## 2022-01-01 DIAGNOSIS — Z01.818 PREOP GENERAL PHYSICAL EXAM: Primary | ICD-10-CM

## 2022-01-01 DIAGNOSIS — N18.31 CHRONIC KIDNEY DISEASE, STAGE 3A (H): ICD-10-CM

## 2022-01-01 DIAGNOSIS — Z78.9 DECREASED ACTIVITIES OF DAILY LIVING (ADL): Primary | ICD-10-CM

## 2022-01-01 LAB
ANION GAP SERPL CALCULATED.3IONS-SCNC: 10 MMOL/L (ref 5–18)
ANION GAP SERPL CALCULATED.3IONS-SCNC: 16 MMOL/L (ref 5–18)
BASOPHILS # BLD AUTO: 0 10E3/UL (ref 0–0.2)
BASOPHILS NFR BLD AUTO: 0 %
BUN SERPL-MCNC: 20 MG/DL (ref 8–28)
BUN SERPL-MCNC: 21 MG/DL (ref 8–28)
CALCIUM SERPL-MCNC: 9.6 MG/DL (ref 8.5–10.5)
CALCIUM SERPL-MCNC: 9.9 MG/DL (ref 8.5–10.5)
CHLORIDE BLD-SCNC: 105 MMOL/L (ref 98–107)
CHLORIDE BLD-SCNC: 107 MMOL/L (ref 98–107)
CO2 SERPL-SCNC: 23 MMOL/L (ref 22–31)
CO2 SERPL-SCNC: 25 MMOL/L (ref 22–31)
CREAT SERPL-MCNC: 0.81 MG/DL (ref 0.6–1.1)
CREAT SERPL-MCNC: 0.96 MG/DL (ref 0.6–1.1)
CREAT UR-MCNC: 162 MG/DL
EOSINOPHIL # BLD AUTO: 0.1 10E3/UL (ref 0–0.7)
EOSINOPHIL NFR BLD AUTO: 2 %
ERYTHROCYTE [DISTWIDTH] IN BLOOD BY AUTOMATED COUNT: 14.8 % (ref 10–15)
GFR SERPL CREATININE-BSD FRML MDRD: 58 ML/MIN/1.73M2
GFR SERPL CREATININE-BSD FRML MDRD: 71 ML/MIN/1.73M2
GLUCOSE BLD-MCNC: 130 MG/DL (ref 70–125)
GLUCOSE BLD-MCNC: 94 MG/DL (ref 70–125)
HCT VFR BLD AUTO: 36.1 % (ref 35–47)
HGB BLD-MCNC: 11.7 G/DL (ref 11.7–15.7)
HGB BLD-MCNC: 12.3 G/DL (ref 11.7–15.7)
HOLD SPECIMEN: NORMAL
HOLD SPECIMEN: NORMAL
IMM GRANULOCYTES # BLD: 0 10E3/UL
IMM GRANULOCYTES NFR BLD: 0 %
INR BLD: 1.7 (ref 0.9–1.1)
INR BLD: 1.8 (ref 0.9–1.1)
INR BLD: 2.3 (ref 0.9–1.1)
INR BLD: 2.4 (ref 0.9–1.1)
INR BLD: 2.5 (ref 0.9–1.1)
INR BLD: 2.6 (ref 0.9–1.1)
INR BLD: 2.7 (ref 0.9–1.1)
INR BLD: 2.7 (ref 0.9–1.1)
INR BLD: 2.8 (ref 0.9–1.1)
INR BLD: 2.9 (ref 0.9–1.1)
INR BLD: 3.1 (ref 0.9–1.1)
INR BLD: 3.5 (ref 0.9–1.1)
INR BLD: 3.5 (ref 0.9–1.1)
INR BLD: 3.8 (ref 0.9–1.1)
INR BLD: 4 (ref 0.9–1.1)
INR BLD: 4.8 (ref 0.9–1.1)
INR PPP: 3.14 (ref 0.85–1.15)
IRON SATN MFR SERPL: 33 % (ref 15–46)
IRON SERPL-MCNC: 92 UG/DL (ref 35–180)
LYMPHOCYTES # BLD AUTO: 1.2 10E3/UL (ref 0.8–5.3)
LYMPHOCYTES NFR BLD AUTO: 17 %
MCH RBC QN AUTO: 29 PG (ref 26.5–33)
MCHC RBC AUTO-ENTMCNC: 32.4 G/DL (ref 31.5–36.5)
MCV RBC AUTO: 90 FL (ref 78–100)
MICROALBUMIN UR-MCNC: 11.61 MG/DL (ref 0–1.99)
MICROALBUMIN/CREAT UR: 71.7 MG/G CR
MONOCYTES # BLD AUTO: 0.6 10E3/UL (ref 0–1.3)
MONOCYTES NFR BLD AUTO: 8 %
NEUTROPHILS # BLD AUTO: 5.3 10E3/UL (ref 1.6–8.3)
NEUTROPHILS NFR BLD AUTO: 73 %
PLATELET # BLD AUTO: 185 10E3/UL (ref 150–450)
POTASSIUM BLD-SCNC: 3.5 MMOL/L (ref 3.5–5)
POTASSIUM BLD-SCNC: 3.8 MMOL/L (ref 3.5–5)
RBC # BLD AUTO: 4.03 10E6/UL (ref 3.8–5.2)
SARS-COV-2 RNA RESP QL NAA+PROBE: NEGATIVE
SODIUM SERPL-SCNC: 142 MMOL/L (ref 136–145)
SODIUM SERPL-SCNC: 144 MMOL/L (ref 136–145)
TIBC SERPL-MCNC: 278 UG/DL (ref 240–430)
WBC # BLD AUTO: 7.2 10E3/UL (ref 4–11)

## 2022-01-01 PROCEDURE — 97535 SELF CARE MNGMENT TRAINING: CPT | Mod: GO | Performed by: OCCUPATIONAL THERAPIST

## 2022-01-01 PROCEDURE — 85610 PROTHROMBIN TIME: CPT | Performed by: FAMILY MEDICINE

## 2022-01-01 PROCEDURE — 85018 HEMOGLOBIN: CPT

## 2022-01-01 PROCEDURE — 99417 PROLNG OP E/M EACH 15 MIN: CPT | Performed by: RADIOLOGY

## 2022-01-01 PROCEDURE — 80048 BASIC METABOLIC PNL TOTAL CA: CPT | Performed by: FAMILY MEDICINE

## 2022-01-01 PROCEDURE — 36415 COLL VENOUS BLD VENIPUNCTURE: CPT | Performed by: FAMILY MEDICINE

## 2022-01-01 PROCEDURE — 97535 SELF CARE MNGMENT TRAINING: CPT | Mod: GP | Performed by: PHYSICAL THERAPIST

## 2022-01-01 PROCEDURE — 99213 OFFICE O/P EST LOW 20 MIN: CPT | Performed by: FAMILY MEDICINE

## 2022-01-01 PROCEDURE — 97140 MANUAL THERAPY 1/> REGIONS: CPT | Mod: GP | Performed by: PHYSICAL THERAPIST

## 2022-01-01 PROCEDURE — 85610 PROTHROMBIN TIME: CPT

## 2022-01-01 PROCEDURE — 36416 COLLJ CAPILLARY BLOOD SPEC: CPT

## 2022-01-01 PROCEDURE — 91305 COVID-19,PF,PFIZER (12+ YRS): CPT | Performed by: FAMILY MEDICINE

## 2022-01-01 PROCEDURE — 97140 MANUAL THERAPY 1/> REGIONS: CPT | Mod: CQ | Performed by: PHYSICAL THERAPY ASSISTANT

## 2022-01-01 PROCEDURE — 36415 COLL VENOUS BLD VENIPUNCTURE: CPT

## 2022-01-01 PROCEDURE — 93971 EXTREMITY STUDY: CPT | Mod: LT

## 2022-01-01 PROCEDURE — 99205 OFFICE O/P NEW HI 60 MIN: CPT | Performed by: CLINICAL NURSE SPECIALIST

## 2022-01-01 PROCEDURE — 82043 UR ALBUMIN QUANTITATIVE: CPT | Performed by: FAMILY MEDICINE

## 2022-01-01 PROCEDURE — 99417 PROLNG OP E/M EACH 15 MIN: CPT | Performed by: CLINICAL NURSE SPECIALIST

## 2022-01-01 PROCEDURE — 85025 COMPLETE CBC W/AUTO DIFF WBC: CPT | Performed by: FAMILY MEDICINE

## 2022-01-01 PROCEDURE — G0439 PPPS, SUBSEQ VISIT: HCPCS | Performed by: FAMILY MEDICINE

## 2022-01-01 PROCEDURE — 83550 IRON BINDING TEST: CPT

## 2022-01-01 PROCEDURE — U0005 INFEC AGEN DETEC AMPLI PROBE: HCPCS | Performed by: FAMILY MEDICINE

## 2022-01-01 PROCEDURE — 69210 REMOVE IMPACTED EAR WAX UNI: CPT | Performed by: FAMILY MEDICINE

## 2022-01-01 PROCEDURE — G0463 HOSPITAL OUTPT CLINIC VISIT: HCPCS

## 2022-01-01 PROCEDURE — 97110 THERAPEUTIC EXERCISES: CPT | Mod: CQ | Performed by: PHYSICAL THERAPY ASSISTANT

## 2022-01-01 PROCEDURE — 99214 OFFICE O/P EST MOD 30 MIN: CPT | Performed by: INTERNAL MEDICINE

## 2022-01-01 PROCEDURE — 99215 OFFICE O/P EST HI 40 MIN: CPT | Performed by: FAMILY MEDICINE

## 2022-01-01 PROCEDURE — 97161 PT EVAL LOW COMPLEX 20 MIN: CPT | Mod: GP | Performed by: PHYSICAL THERAPIST

## 2022-01-01 PROCEDURE — 97165 OT EVAL LOW COMPLEX 30 MIN: CPT | Mod: GO | Performed by: OCCUPATIONAL THERAPIST

## 2022-01-01 PROCEDURE — 97110 THERAPEUTIC EXERCISES: CPT | Mod: GP | Performed by: PHYSICAL THERAPIST

## 2022-01-01 PROCEDURE — 99205 OFFICE O/P NEW HI 60 MIN: CPT | Performed by: RADIOLOGY

## 2022-01-01 PROCEDURE — 99214 OFFICE O/P EST MOD 30 MIN: CPT | Performed by: FAMILY MEDICINE

## 2022-01-01 PROCEDURE — 0054A COVID-19,PF,PFIZER (12+ YRS): CPT | Performed by: FAMILY MEDICINE

## 2022-01-01 PROCEDURE — 99214 OFFICE O/P EST MOD 30 MIN: CPT | Mod: 25 | Performed by: FAMILY MEDICINE

## 2022-01-01 PROCEDURE — U0003 INFECTIOUS AGENT DETECTION BY NUCLEIC ACID (DNA OR RNA); SEVERE ACUTE RESPIRATORY SYNDROME CORONAVIRUS 2 (SARS-COV-2) (CORONAVIRUS DISEASE [COVID-19]), AMPLIFIED PROBE TECHNIQUE, MAKING USE OF HIGH THROUGHPUT TECHNOLOGIES AS DESCRIBED BY CMS-2020-01-R: HCPCS | Performed by: FAMILY MEDICINE

## 2022-01-01 RX ORDER — OXYCODONE HYDROCHLORIDE 5 MG/1
TABLET ORAL
COMMUNITY
Start: 2022-01-01 | End: 2022-01-01

## 2022-01-01 RX ORDER — GABAPENTIN 300 MG/1
300 CAPSULE ORAL 3 TIMES DAILY
Qty: 270 CAPSULE | Refills: 1 | Status: SHIPPED | OUTPATIENT
Start: 2022-01-01

## 2022-01-01 RX ORDER — GABAPENTIN 300 MG/1
300 CAPSULE ORAL 2 TIMES DAILY
Qty: 180 CAPSULE | Refills: 1 | Status: SHIPPED | OUTPATIENT
Start: 2022-01-01 | End: 2022-01-01

## 2022-01-01 RX ORDER — LISINOPRIL 20 MG/1
TABLET ORAL
Qty: 90 TABLET | Refills: 3 | Status: SHIPPED | OUTPATIENT
Start: 2022-01-01

## 2022-01-01 RX ORDER — HYDROCHLOROTHIAZIDE 25 MG/1
TABLET ORAL
Qty: 90 TABLET | Refills: 0 | Status: SHIPPED | OUTPATIENT
Start: 2022-01-01 | End: 2022-01-01

## 2022-01-01 RX ORDER — DOXYCYCLINE HYCLATE 100 MG
100 TABLET ORAL 2 TIMES DAILY
Qty: 14 TABLET | Refills: 0 | Status: SHIPPED | OUTPATIENT
Start: 2022-01-01

## 2022-01-01 RX ORDER — WARFARIN SODIUM 5 MG/1
TABLET ORAL
Qty: 80 TABLET | Refills: 1 | Status: SHIPPED | OUTPATIENT
Start: 2022-01-01

## 2022-01-01 RX ORDER — GABAPENTIN 100 MG/1
CAPSULE ORAL
Qty: 198 CAPSULE | Refills: 0 | Status: SHIPPED | OUTPATIENT
Start: 2022-01-01 | End: 2022-01-01

## 2022-01-01 RX ORDER — HYDROCHLOROTHIAZIDE 25 MG/1
TABLET ORAL
Qty: 90 TABLET | Refills: 3 | Status: SHIPPED | OUTPATIENT
Start: 2022-01-01

## 2022-01-01 RX ORDER — GABAPENTIN 100 MG/1
CAPSULE ORAL
COMMUNITY
Start: 2022-01-01

## 2022-01-01 RX ORDER — LISINOPRIL 20 MG/1
TABLET ORAL
Qty: 90 TABLET | Refills: 0 | Status: SHIPPED | OUTPATIENT
Start: 2022-01-01 | End: 2022-01-01

## 2022-01-01 RX ORDER — GABAPENTIN 300 MG/1
300 CAPSULE ORAL 3 TIMES DAILY
Qty: 270 CAPSULE | Refills: 1 | Status: SHIPPED | OUTPATIENT
Start: 2022-01-01 | End: 2022-01-01

## 2022-01-01 ASSESSMENT — PAIN SCALES - GENERAL
PAINLEVEL: NO PAIN (0)

## 2022-01-01 ASSESSMENT — MIFFLIN-ST. JEOR: SCORE: 961.64

## 2022-01-01 ASSESSMENT — ACTIVITIES OF DAILY LIVING (ADL): CURRENT_FUNCTION: NO ASSISTANCE NEEDED

## 2022-01-13 NOTE — PROGRESS NOTES
Elvira Bush is a 81 year old female who is presenting at the current time to discuss her diagnosi(es) of     Left arm swelling  .      HPI: Elvira Bush is a 81 year old female on warfarin AC for AF who is currently seven years S/P Lt breast lumpectomy and Lt axillary sentinel lymph node biopsy. Since that time, she noted the immediate development of LUE swelling. There is noticeable asymmetry between the arms. She notes she would like it to go away as this is bothersome for her. She has not worn an appropriately fitted compression sleeve. She does not have a known h/o DVT in the LUE. She does not lift her arms above her head or shoulders repetitively as she has limited ROM. She had a carpal tunnel release undertaken w/o relief of compressive neuropathic sxs. When last seen on 12/07/21, I stated she has some but not all clinical features associated with lymphedema. Given her venous collaterals, one wonders about TOS playing a role herein, but she does not lift her arm above her head. I advised she wear a compressive sleeve to the axilla, RTC presently to discuss response. She is extremely;y please with sxs improvement wearing the sleeve. On one occasion she forgot to wear it her arm reaccumulated fluid rapidly, impressing upon her the need to use it. We obtained a LUE venous duplex w/o maneuvers earlier today which was notable for non visualization of the Lt SCV and AV.       Review Of Systems  Skin: negative  Eyes: negative  Ears/Nose/Throat: negative  Respiratory: No shortness of breath, dyspnea on exertion, cough, or hemoptysis  Cardiovascular: negative  Gastrointestinal: negative  Genitourinary: negative  Musculoskeletal: negative  Neurologic: negative  Psychiatric: negative  Hematologic/Lymphatic/Immunologic: negative  Endocrine: negative      PAST MEDICAL HISTORY:                  Past Medical History:   Diagnosis Date     Acute respiratory failure, unspecified whether with hypoxia or hypercapnia  (H)      Atrial fibrillation (H)      Breast cancer (H) 3-2014     Breast cancer of lower-outer quadrant of left female breast (H)     Created by Conversion      Closed displaced fracture of left pubis, initial encounter (H)      Closed fracture of spinous process of lumbar vertebra (H) 9/9/2017     Encephalopathy      GERD (gastroesophageal reflux disease)      HTN (hypertension)      Hx of radiation therapy 5-2014    Left Breast     Hypercholesterolemia      Intraabdominal hemorrhage 9/9/2017     Osteopenia      Pelvic fracture (H) 9/9/2017     Seizures (H)      Skin cancer      Transient alteration of awareness        PAST SURGICAL HISTORY:                  Past Surgical History:   Procedure Laterality Date     ARTHROSCOPY KNEE Right 2009     BIOPSY BREAST Left 4/21/2021    Procedure: LEFT BREAST BIOPSY TIMES 2;  Surgeon: Didier Durán MD;  Location: [facility];  Service: General     LUMPECTOMY BREAST Left 3-2014     [facility]  7/27/2017          SKIN CANCER EXCISION       TENDON RELEASE         CURRENT MEDICATIONS:                  Current Outpatient Medications   Medication Sig Dispense Refill     atenolol (TENORMIN) 100 MG tablet Take 1 tablet (100 mg) by mouth daily 90 tablet 3     atorvastatin (LIPITOR) 20 MG tablet TAKE 1 TABLET BY MOUTH EVERYDAY AT BEDTIME 90 tablet 3     calcium-vitamin D (CALCIUM-VITAMIN D) 500 mg(1,250mg) -200 unit per tablet [CALCIUM-VITAMIN D (CALCIUM-VITAMIN D) 500 MG(1,250MG) -200 UNIT PER TABLET] Take 2 tablets by mouth every morning.        calcium-vitamin D 500 mg(1,250mg) -200 unit per tablet [CALCIUM-VITAMIN D 500 MG(1,250MG) -200 UNIT PER TABLET] Take 1 tablet by mouth at bedtime.       hydrochlorothiazide (HYDRODIURIL) 25 MG tablet TAKE 1 TABLET BY MOUTH EVERY DAY. DUE FOR MED CHECK 90 tablet 0     levETIRAcetam (KEPPRA) 500 MG tablet [LEVETIRACETAM (KEPPRA) 500 MG TABLET] Take 1,250 mg by mouth 2 (two) times a day.        lisinopril (ZESTRIL) 20 MG tablet TAKE 1  TABLET BY MOUTH EVERY DAY. DUE FOR MED CHECK 90 tablet 0     magnesium oxide (MAG-OX) 400 mg tablet [MAGNESIUM OXIDE (MAG-OX) 400 MG TABLET] Take 1 tablet (400 mg total) by mouth daily.  0     multivitamin (MULTIVITAMIN) per tablet [MULTIVITAMIN (MULTIVITAMIN) PER TABLET] Take 1 tablet by mouth daily.       omeprazole (PRILOSEC) 20 MG DR capsule TAKE 1 CAPSULE BY MOUTH EVERY DAY 90 capsule 2     warfarin ANTICOAGULANT (COUMADIN) 5 MG tablet Take 0.5-1 tablets (2.5-5 mg) by mouth daily Adjust dose per INR results as instructed. 90 tablet 1     aspirin 81 MG tablet Take 81 mg by mouth every evening  (Patient not taking: Reported on 1/13/2022)         ALLERGIES:                No Known Allergies    SOCIAL HISTORY:                  Social History     Socioeconomic History     Marital status:      Spouse name: Not on file     Number of children: Not on file     Years of education: Not on file     Highest education level: Not on file   Occupational History     Not on file   Tobacco Use     Smoking status: Never Smoker     Smokeless tobacco: Never Used   Substance and Sexual Activity     Alcohol use: Yes     Alcohol/week: 2.0 standard drinks     Comment: Alcoholic Drinks/day: no alcohol this summer     Drug use: No     Sexual activity: Not Currently   Other Topics Concern     Not on file   Social History Narrative     Not on file     Social Determinants of Health     Financial Resource Strain: Not on file   Food Insecurity: Not on file   Transportation Needs: Not on file   Physical Activity: Not on file   Stress: Not on file   Social Connections: Not on file   Intimate Partner Violence: Not on file   Housing Stability: Not on file       FAMILY HISTORY:                   Family History   Problem Relation Age of Onset     Coronary Artery Disease Mother      No Known Problems Daughter      Cerebrovascular Disease Maternal Grandmother      Prostate Cancer Maternal Grandfather      Cerebral aneurysm Maternal Aunt      No  Known Problems Paternal Aunt      Skin Cancer Son      Hereditary Breast and Ovarian Cancer Syndrome No family hx of      Breast Cancer No family hx of      Cancer No family hx of      Colon Cancer No family hx of      Endometrial Cancer No family hx of      Ovarian Cancer No family hx of            Physical exam Reveals:    O/P: WNL  HEENT: WNL  NECK: No JVD, thyromegaly, or lymphadenopathy  HEART: RRR, no murmurs, gallops, or rubs  LUNGS: CTA bilaterally without rales, wheezes, or rhonchi  GI: NABS, nondistended, nontender, soft  EXT:without cyanosis, clubbing, or LE edema; positive for LUE edema well managed by lymphedema sleeve.  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 She is not interested in pursuing procedures. I advised her this is likely chronic and would not be able to opened with mechanical thrombectomy. I offered to obtain a d dimer , thinking this would likely be normal if chronic (as these sxs began seven years ago a time of lumpectomy)  or could in the unlikely event of an acute thrombotic component be elevated. She declined stating that at her age she wants fewer rather than more medical procedures. I explained to her that there was an acute component present, we would lose a window of therapeutic opportunity by not investigating this further presently. She still declined to proceed understanding the above.   Plan: RTC prn

## 2022-01-13 NOTE — PROGRESS NOTES
"[facility]        Patient is here for a follow up  to discuss about LUE venous US results       BP (!) 148/81 (BP Location: Right arm, Patient Position: Chair, Cuff Size: Adult Regular)   Pulse 62   Resp 16   Ht 5' 4\" (1.626 m)   Wt 115 lb (52.2 kg)   SpO2 100%   BMI 19.74 kg/m      The provider has been notified that the patient has no concerns.     Questions patient would like addressed today are: N/A.    Refills are needed: No    Has homecare services and agency name:  Gem[facility]      "

## 2022-01-13 NOTE — PATIENT INSTRUCTIONS
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     She is not interested in pursuing procedures. Dr. Montejo advised her this is likely chronic and would not be able to opened with mechanical thrombectomy. He offered to obtain a d-dimer (lab) , thinking this would likely result in a normal value if the clot is chronic (as these sxs began seven years ago a time of lumpectomy)  or if in the unlikely event she has an acute thrombotic issue it would be elevated.    She declined stating that at her age she wants fewer rather than more medical procedures.    Dr. Montejo explained to her that if there was an acute component present, we would lose a window of therapeutic opportunity by not investigating this further presently. She still declined to proceed understanding the above.     She should return to the clinic for further concerns.

## 2022-01-13 NOTE — TELEPHONE ENCOUNTER
A/P summarized, printed, placed in outgoing mail to address in chart.    Patient notified via telephone.  Tricia Hope RN BSN  [facility]  [phone]

## 2022-01-13 NOTE — TELEPHONE ENCOUNTER
[facility]    Who is the name of the provider?:  Saima      What is the location you see this provider at?: [place]    Reason for call:  Office visit today.  States no family could accompany her today. States she did not understand all that Dr. Montejo told her and cannot explain to her family.  Requesting a summary be mailed to her.      Can we leave a detailed message on this number?  YES

## 2022-01-19 NOTE — PROGRESS NOTES
ANTICOAGULATION MANAGEMENT     Elvira Bush 81 year old female is on warfarin with therapeutic INR result. (Goal INR 2.0-3.0)    Recent labs: (last 7 days)     01/19/22  1207   INR 2.8*       ASSESSMENT     Source(s): Chart Review, Patient/Caregiver Call and Template       Warfarin doses taken: Warfarin taken as instructed    Diet: No new diet changes identified    New illness, injury, or hospitalization: No    Medication/supplement changes: None noted    Signs or symptoms of bleeding or clotting: No    Previous INR: Therapeutic last 2(+) visits    Additional findings: None     PLAN     Recommended plan for no diet, medication or health factor changes affecting INR     Dosing Instructions: Continue your current warfarin dose with next INR in 5 weeks       Summary  As of 1/19/2022    Full warfarin instructions:  2.5 mg every Tue; 5 mg all other days   Next INR check:  2/23/2022             Telephone call with Elvira who verbalizes understanding and agrees to plan    Lab visit scheduled    Education provided: Goal range and significance of current result and Importance of therapeutic range    Plan made per [facility] anticoagulation protocol    Truman Richards RN  Anticoagulation Clinic  1/19/2022    _______________________________________________________________________     Anticoagulation Episode Summary     Current INR goal:  2.0-3.0   TTR:  67.4 % (1 y)   Target end date:  Indefinite   Send INR reminders to:  LAINE ENRIQUEZ       Comments:           Anticoagulation Care Providers     Provider Role Specialty Phone number    Loli Alberts MD Referring Family Medicine [phone]

## 2022-02-23 NOTE — PROGRESS NOTES
ANTICOAGULATION MANAGEMENT     Elvira Bush 81 year old female is on warfarin with therapeutic INR result. (Goal INR 2.0-3.0)    Recent labs: (last 7 days)     02/23/22  1150   INR 2.7*       ASSESSMENT     Source(s): Chart Review and Template       Warfarin doses taken: Warfarin taken as instructed    Diet: No new diet changes identified    New illness, injury, or hospitalization: No    Medication/supplement changes: None noted    Signs or symptoms of bleeding or clotting: No    Previous INR: Therapeutic last 2(+) visits    Additional findings: None     PLAN     Recommended plan for no diet, medication or health factor changes affecting INR     Dosing Instructions: Continue your current warfarin dose with next INR in 6 weeks       Summary  As of 2/23/2022    Full warfarin instructions:  2.5 mg every Tue; 5 mg all other days   Next INR check:  4/6/2022             Telephone call with Elvira who verbalizes understanding and agrees to plan    Lab visit scheduled    Education provided: Contact [phone] with any changes, questions or concerns.     Plan made per [facility] anticoagulation protocol    Santa Shultz RN  Anticoagulation Clinic  2/23/2022    _______________________________________________________________________     Anticoagulation Episode Summary     Current INR goal:  2.0-3.0   TTR:  74.2 % (1 y)   Target end date:  Indefinite   Send INR reminders to:  LAINE ENRIQUEZ       Comments:           Anticoagulation Care Providers     Provider Role Specialty Phone number    Loli Alberts MD Referring Family Medicine [phone]

## 2022-02-28 NOTE — TELEPHONE ENCOUNTER
Reason for Call:  Other scan    Detailed comments:Pt called to notify that the scan she is having tomorrow is going to be a PET scan, not a CT scan as she thought.     Phone Number Patient can be reached at: Home number on file [phone] (home)    Best Time: anytime    Can we leave a detailed message on this number? YES    Call taken on 2/28/2022 at 3:06 PM by Triston Carter

## 2022-03-04 NOTE — PROGRESS NOTES
"SUBJECTIVE: Elvira Bush is a 81 year old White female who presents today for follow-up on her left upper extremity lymphedema.  She has been suffering with pain and swelling for years.  I got a personal phone call from Dr. Didier Chung from [facility] concerning this.  She had gone to see him on 1/27/2022 with a complaint of left upper extremity pain which she thought might be somewhat related to the fact that she had left carpal tunnel release surgery.  He noted that she was not wearing her compression sleeve high enough into the axilla.  He called to tell me he was concerned that she may be having recurrence of breast cancer.  I had sent her to see the vascular surgeon Dr. Montejo previous to her visit with Dr. Chung.  She was seen on 12/7/2021 and 1/13/2022.  He had ordered a venous duplex which was done and showed occlusive DVT involving the left axillary and subclavian veins.  Likely chronic given chronic pain she was having.  I agreed to see her today.  Today the patient tells me that Dr. Chung ordered a CT or PET scan for tomorrow afternoon.  I had told him I would look into what needed to be done.  After review of the chart, it is noted that she is 7 years status post breast cancer with left breast lumpectomy and left sentinel node biopsy.  She had a diagnostic mammogram on 2/17/2021 which was \"suspicious\".  She subsequently had breast biopsy x2 on 4/15/2021 which was negative for malignancy.  At that point it was decided by the patient that she was no longer going to have mammograms done.  The patient shows me how she cannot even raise her arm above her shoulder against gravity and even with me trying to help her lift it she is unable to do so.  I told her that it seems like she has a frozen shoulder.  This is making it difficult for her to lift her arm to reduce the amount of swelling she has.  She tells me she had previously had a frozen shoulder but was not sure it was this shoulder or " the right shoulder.  We discussed she may need to have a procedure followed by physical therapy in order to get better mobility.  This is ultimately what she wants to have.  She wants to be able to wash and comb her hair.  The patient does have atrial fibrillation and is currently anticoagulated on warfarin.  We discussed that the DVT that was found has likely existed longer than she has been on warfarin.  She has hypertension which is not perfectly controlled today.  She does not want to increase any medication as she feels her anxiety is likely the cause.  She has mild chronic kidney disease, seizure disorder secondary to meningioma for which she is treated with prophylactic anticonvulsants.  She is due for her annual wellness visit in mid May and prefers to wait for any blood lab work at that time.  She is willing to give me some urine.    OBJECTIVE: BP (!) 142/80   Pulse 76   Wt 50.8 kg (112 lb)   SpO2 97%   BMI 19.22 kg/m    General: Thin, somewhat frail appearing octogenarian in no acute physical distress  Heart: Irregularly irregular, controlled rate  Lungs: Clear bilaterally  Abdomen: Soft  Extremities: Lower extremities warm dry and without edema, left upper extremity with extremely decreased range of motion at the shoulder, compression sleeve worn, lymphedema seen in hand.      ASSESSMENT & PLAN:     Lymphedema of left upper extremity  Needs to continue with her compression sleeve wearing it to the axilla.  Unfortunately unable to raise her arm in order to minimalize swelling.    History of left breast cancer  Concern for reoccurrence.  Dr. Saravia has ordered a CT, or PET scan?  To be done Thursday, tomorrow.  Recent breast biopsy x2 was negative.    Chronic deep vein thrombosis (DVT) of axillary vein of left upper extremity (H)  Recent appointment with vascular surgeon Dr. Montejo who did a venous duplex ultrasound finding chronic(years) left axillary and subclavian vein DVT.    Chronic atrial  fibrillation (H)  Anticoagulated on warfarin.    Adhesive capsulitis of left shoulder  I think she has frozen shoulder which is affecting her ability to treat her lymphedema of the left upper extremity.  She should consider manipulation and physical therapy.    Essential hypertension  Not perfectly controlled today but is likely secondary to pain and anxiety of the situation.    Localization-related focal epilepsy with simple partial seizures (H)  On prophylactic Keppra.    Meningioma (H)  Last MRI showed small hemangioma.    Chronic kidney disease, stage 3a (H)  Will monitor urine labs today.  - Albumin Random Urine Quantitative with Creat Ratio  - Urinalysis Macroscopic - lab collect      I will get back to her on those lab results by mail or phone.  Hopefully I will get the results of the study that Dr. Chung ordered.  Hopefully there will be no worry of recurrence of breast cancer.  She is going to follow-up in mid May for an annual wellness visit and at that time we will do blood labs and go over her results and specialist visits since this visit.     Patient Active Problem List   Diagnosis     Osteopenia     Esophageal reflux     Essential Hypercholesterolemia     Essential hypertension     Headache     Elevated LFTs     Meningioma (H)     Chronic atrial fibrillation (H)     Convulsions, unspecified convulsion type (H)     Encephalomalacia on imaging study     Localization-related focal epilepsy with simple partial seizures (H)     History of left breast cancer     Chronic kidney disease, stage 3a (H)       Current Outpatient Medications   Medication     atenolol (TENORMIN) 100 MG tablet     atorvastatin (LIPITOR) 20 MG tablet     calcium-vitamin D (CALCIUM-VITAMIN D) 500 mg(1,250mg) -200 unit per tablet     calcium-vitamin D 500 mg(1,250mg) -200 unit per tablet     hydrochlorothiazide (HYDRODIURIL) 25 MG tablet     levETIRAcetam (KEPPRA) 500 MG tablet     lisinopril (ZESTRIL) 20 MG tablet     magnesium  oxide (MAG-OX) 400 mg tablet     multivitamin (MULTIVITAMIN) per tablet     omeprazole (PRILOSEC) 20 MG DR capsule     warfarin ANTICOAGULANT (COUMADIN) 5 MG tablet     No current facility-administered medications for this visit.     Answers for HPI/ROS submitted by the patient on 2/28/2022  How many servings of fruits and vegetables do you eat daily?: 2-3  On average, how many sweetened beverages do you drink each day (Examples: soda, juice, sweet tea, etc.  Do NOT count diet or artificially sweetened beverages)?: 1  How many minutes a day do you exercise enough to make your heart beat faster?: 20 to 29  How many days per week do you miss taking your medication?: 0  What is the reason for your visit today?: Arm swelling  When did your symptoms begin?: More than a month  What are your symptoms?: Pain swelling tingling  How would you describe these symptoms?: Moderate  Are your symptoms:: Staying the same  Have you had these symptoms before?: Yes  Have you tried or received treatment for these symptoms before?: Yes  Did that treatment work? : No  Is there anything that makes you feel worse?: No  Is there anything that makes you feel better?: No

## 2022-03-15 PROBLEM — I89.0 LYMPHEDEMA OF LEFT UPPER EXTREMITY: Status: ACTIVE | Noted: 2022-01-01

## 2022-03-15 PROBLEM — I89.0 LYMPHEDEMA OF LEFT UPPER EXTREMITY: Chronic | Status: ACTIVE | Noted: 2022-01-01

## 2022-03-15 PROBLEM — I82.A29: Status: ACTIVE | Noted: 2022-01-01

## 2022-03-15 NOTE — PATIENT INSTRUCTIONS
Preparing for Your Surgery  Getting started  A nurse will call you to review your health history and instructions. They will give you an arrival time based on your scheduled surgery time. Please be ready to share:    Your doctor's clinic name and phone number    Your medical, surgical and anesthesia history    A list of allergies and sensitivities    A list of medicines, including herbal treatments and over-the-counter drugs    Whether the patient has a legal guardian (ask how to send us the papers in advance)  Please tell us if you're pregnant--or if there's any chance you might be pregnant. Some surgeries may injure a fetus (unborn baby), so they require a pregnancy test. Surgeries that are safe for a fetus don't always need a test, and you can choose whether to have one.   If you have a child who's having surgery, please ask for a copy of Preparing for Your Child's Surgery.    Preparing for surgery    Within 30 days of surgery: Have a pre-op exam (sometimes called an H&P, or History and Physical). This can be done at a clinic or pre-operative center.  ? If you're having a , you may not need this exam. Talk to your care team.    At your pre-op exam, talk to your care team about all medicines you take. If you need to stop any medicines before surgery, ask when to start taking them again.  ? We do this for your safety. Many medicines can make you bleed too much during surgery. Some change how well surgery (anesthesia) drugs work.    Call your insurance company to let them know you're having surgery. (If you don't have insurance, call [phone].)    Call your clinic if there's any change in your health. This includes signs of a cold or flu (sore throat, runny nose, cough, rash, fever). It also includes a scrape or scratch near the surgery site.    If you have questions on the day of surgery, call your hospital or surgery center.  COVID testing  You may need to be tested for COVID-19 before having  surgery. If so, your surgical team will give you instructions for scheduling this test, separate from your preoperative history and physical.  Eating and drinking guidelines  For your safety: Unless your surgeon tells you otherwise, follow the guidelines below.    Eat and drink as usual until 8 hours before surgery. After that, no food or milk.    Drink clear liquids until 2 hours before surgery. These are liquids you can see through, like water, Gatorade and Propel Water. You may also have black coffee and tea (no cream or milk).    Nothing by mouth within 2 hours of surgery. This includes gum, candy and breath mints.    If you drink alcohol: Stop drinking it the night before surgery.    If your care team tells you to take medicine on the morning of surgery, it's okay to take it with a sip of water.  Preventing infection    Shower or bathe the night before and morning of your surgery. Follow the instructions your clinic gave you. (If no instructions, use regular soap.)    Don't shave or clip hair near your surgery site. We'll remove the hair if needed.    Don't smoke or vape the morning of surgery. You may chew nicotine gum up to 2 hours before surgery. A nicotine patch is okay.  ? Note: Some surgeries require you to completely quit smoking and nicotine. Check with your surgeon.    Your care team will make every effort to keep you safe from infection. We will:  ? Clean our hands often with soap and water (or an alcohol-based hand rub).  ? Clean the skin at your surgery site with a special soap that kills germs.  ? Give you a special gown to keep you warm. (Cold raises the risk of infection.)  ? Wear special hair covers, masks, gowns and gloves during surgery.  ? Give antibiotic medicine, if prescribed. Not all surgeries need antibiotics.  What to bring on the day of surgery    Photo ID and insurance card    Copy of your health care directive, if you have one    Glasses and hearing aides (bring cases)  ? You can't  wear contacts during surgery    Inhaler and eye drops, if you use them (tell us about these when you arrive)    CPAP machine or breathing device, if you use them    A few personal items, if spending the night    If you have . . .  ? A pacemaker, ICD (cardiac defibrillator) or other implant: Bring the ID card.  ? An implanted stimulator: Bring the remote control.  ? A legal guardian: Bring a copy of the certified (court-stamped) guardianship papers.  Please remove any jewelry, including body piercings. Leave jewelry and other valuables at home.  If you're going home the day of surgery    You must have a responsible adult drive you home. They should stay with you overnight as well.    If you don't have someone to stay with you, and you aren't safe to go home alone, we may keep you overnight. Insurance often won't pay for this.  After surgery  If it's hard to control your pain or you need more pain medicine, please call your surgeon's office.  Questions?   If you have any questions for your care team, list them here: _________________________________________________________________________________________________________________________________________________________________________ ____________________________________ ____________________________________ ____________________________________  For informational purposes only. Not to replace the advice of your health care provider. Copyright   2003, 2019 [facility]. All rights reserved. Clinically reviewed by Nini Zhao MD. [email or web address] [id number] - REV 07/21.    Before Your Procedure or Hospital Admission  Testing for COVID-19 (Coronavirus)  Thank you for choosing [facility] for your health care needs. The COVID-19 pandemic is a very challenging time for everyone.   Our goal is to keep you and our team here at [facility] safe and healthy. We've taken many steps to make this happen. For example:    We test and screen our staff, care teams and  "patients for COVID-19.    Everyone at [facility] must wear a mask and stay 6 feet apart.    We are limiting hospital and clinic visitors.  Before you come in  If you test COVID-19 positive with any kind of test before your surgery date, call your surgeon's office. We need to know the date of your positive test. We may need to re-schedule your surgery.  Unless you've had a positive COVID-19 test within the past 90 days, you must get tested for COVID-19 even if you've been vaccinated. Your test needs to happen 2 to 4 days before you check in to the hospital or surgery site.  A clinic scheduler will call you about a week in advance to set up a testing time at one of our labs.  Note: If you have a test anywhere but [facility], be sure you get an RT-PCR or an NAAT (Nucleic Acid Amplification Test) test.  Do NOT get a \"rapid antigen\" test. Negative results from \"rapid antigen\" tests are NOT accepted before your surgery.  After the test, please stay at home and out of contact with other people. This will help prevent possible COVID-19 exposure before your treatment. Please follow all current safety guidelines, including:    Limit trips outside your home.    Limit the number of people you see.    Always wear a mask outside your home.    Use social distancing. Stay 6 feet away from others whenever you can.    Wash your hands often.  If your test shows you have COVID-19  If your test is positive, we'll let you know. A positive test means that you have the virus.   We'll probably have to postpone your admission, surgery or procedure. Your doctor will discuss this with you. After that, we'll let you know what to do and when you can re-schedule.   We may need to cancel your treatment on short notice for other reasons, too.  If your test shows you DON'T have COVID-19  Even if your test is negative, you can still get COVID-19. It's rare but, sometimes, the test result is wrong. You could also catch the virus after " taking the test.   There's a very small chance that you could catch COVID-19 in the hospital or surgery center. [facility] has taken many steps to prevent this from happening.   Day of your surgery or procedure    Please come wearing a face covering that covers both your nose and mouth.    When you arrive, we'll ask you some questions to find out if you've had any exposures to COVID-19, or have any signs of COVID-19.    Ask your care team if you can have visitors. All visitors must wear face coverings and will be screened for exposure to, or signs of, COVID-19.  ? Even if no visitors are allowed, you can still have with you:    Your legal guardian or legal decision maker    Someone to help you, if you are disabled    A parent and one other visitor, if you are younger than 18 years old    A partner and a , if you are in labor  ? We might need to teach you about taking care of yourself after surgery. If so, a visitor can come into the hospital to learn about it, too.  ? The rules for visitors change often, depending on how much the virus is spreading. To learn more, see Visiting a Loved One in the Hospital during the COVID-19 Outbreak.  Please call your care team, hospital or surgery center if you have any questions. We thank you for your understanding and for choosing [facility] for your care.   Possible surgery delay    Like you, we want your surgery to happen when it's scheduled. But sometimes the hospital is so full that it's not safe for you to have your surgery. This is especially true during the pandemic. Your surgery may need to be re-scheduled at a later date. If this happens, we will call and tell you.  Questions and answers  Does it matter where I get tested for COVID-19?  Yes. We urge you to get tested at one of our [facility] COVID-19 testing sites. These tests will be either RT-PCR or NAAT, and are accepted. We process these tests in our lab and can get the results quickly. Your  "[facility] care team needs to get your results before you check in.  What should I do if I can't get tested at [facility]?  You can get tested somewhere else, but you'll need to take these extra steps:   1. Contact your family doctor or clinic to arrange your test.  2. Take the test within 4 days of your surgery or procedure. We can't accept tests older than 4 days.  3. Make sure you're getting an RT-PCR test, or a NAAT. Some places use \"rapid antigen\" tests, but we do NOT accept negative results from those tests before your surgery.  4. Make sure your doctor or clinic faxes your results to [facility] at [phone]. Or take a photo of the results and upload it into [employer].  If we don't get your results in time, we may have to delay or cancel your treatment.  For informational purposes only. Not to replace the advice of your health care provider. Copyright   2020 [facility]. All rights reserved. Clinically reviewed by Infection Prevention and the [facility] COVID-19 Clinical Team. [employer] [id number] - Rev 02/01/22.    How to Take Your Medication Before Surgery  - HOLD (do not take) Warfarin from now until surgery and, hold lisinopril day of surgery.  - STOP taking all vitamins and herbal supplements 14 days before surgery.   -Okay to take all other meds the morning of surgery with sips of water.    "

## 2022-03-15 NOTE — PROGRESS NOTES
ANTICOAGULATION MANAGEMENT     Elvira Bush 81 year old female is on warfarin with supratherapeutic INR result. (Goal INR 2.0-3.0)    Recent labs: (last 7 days)     03/15/22  0734   INR 4.8*       ASSESSMENT       Source(s): Chart Review and Patient/Caregiver Call       Warfarin doses taken: Warfarin taken as instructed    Diet: Change in alcohol intake may be affecting INR. had a few glasses of wine last night with friends     New illness, injury, or hospitalization: No    Medication/supplement changes: None noted    Signs or symptoms of bleeding or clotting: No    Previous INR: Therapeutic last 2(+) visits    Additional findings: Upcoming surgery/procedure skin biopsy 3/17. Saw Dr Alberts today who advised holding warfarin until after surgery. We discussed adding a salad today or tomorrow to help lower inr. Kym says dermatologist had no inr requirement       PLAN     Recommended plan for no diet, medication or health factor changes affecting INR     Dosing Instructions: Hold 2 doses then continue your current warfarin dose with next INR in 10 days      Summary  As of 3/15/2022    Full warfarin instructions:  3/15: Hold; 3/16: Hold; Otherwise 2.5 mg every Tue; 5 mg all other days   Next INR check:  3/22/2022             Telephone call with Elvira who verbalizes understanding and agrees to plan    Lab visit scheduled    Education provided: None required    Plan made per [facility] anticoagulation protocol    Araceli Sena RN  Anticoagulation Clinic  3/15/2022    _______________________________________________________________________     Anticoagulation Episode Summary     Current INR goal:  2.0-3.0   TTR:  69.5 % (1 y)   Target end date:  Indefinite   Send INR reminders to:  LAINE ENRIQUEZ       Comments:           Anticoagulation Care Providers     Provider Role Specialty Phone number    Loli Alberts MD Referring Family Medicine [phone]

## 2022-03-15 NOTE — PROGRESS NOTES
[facility]  [address]  [place] [facility] [address]  Phone: [phone]  Fax: [phone]  Primary Provider: Loli Alberts  Pre-op Performing Provider: LOLI ALBERTS      PREOPERATIVE EVALUATION:  Today's date: 3/15/2022    Elvira Bush is a 81 year old female who presents for a preoperative evaluation.    Surgical Information:  Surgery/Procedure: 2 skin biopsys in Left armpit and left shoulder  Surgery Location: [facility]  Surgeon: Dr. Didier Benítez  Surgery Date: 3/17/22  Time of Surgery: TBD  Where patient plans to recover: At home with family  Fax number for surgical facility: [phone]    Type of Anesthesia Anticipated: local and MAC    Preop general physical exam  Patient is cleared for surgery as long as the INR is closer to 2 before surgery.  There will be recheck of INR on 3/16/2022.  - Basic Metabolic Panel  - Hemoglobin w/Reflex to Iron Studies  - Asymptomatic COVID-19 Virus (Coronavirus) by PCR Nose    Skin lesion  Concern for recurrence of breast cancer    Lymphedema of left upper extremity  Worsening over time.    History of left breast cancer  Diagnosed in March 2014 and identified as triple negative grade 3.  Patient status post surgery, radiation and declined chemotherapy at the time.  Thickening of skin of left breast was found and biopsied in April 2021, 2 biopsies were both negative for malignancy.    Chronic deep vein thrombosis (DVT) of axillary vein of left upper extremity (H)  Found on upper extremity ultrasound venous duplex study of 1/13/2022.  Occlusive DVT seen in the left axillary and left subclavian veins.  Believed to be chronic occlusion, despite warfarin use.    Chronic atrial fibrillation (H)  On warfarin.  Last dose last night.  Patient was unaware she should hold her warfarin but is willing to do so from now till Thursday.  - INR point of care    Essential  hypertension  Mildly elevated at 144/90.    Essential Hypercholesterolemia  Last LDL 79 on Lipitor 20 mg.    Localization-related focal epilepsy with simple partial seizures (H)  On Keppra and has not had seizure for years.    Chronic kidney disease, stage 3a (H)  Very mild, will check labs.  - Albumin Random Urine Quantitative with Creat Ratio    She should see me back in 2 months.        Subjective     HPI related to upcoming procedure: Patient has a history of worsening left upper extremity lymphedema with decreased mobility of the shoulder.  She had been to [facility] and they were concerned for recurrence of cancer.  Apparently there was a PET scan done which I cannot see the result of.  She is now scheduled to get biopsies of 2 different areas in which there is thickening of the skin, 1 on the upper outer left breast and 1 on the scapular area of the left shoulder.    She has a history of atrial fibrillation and had not held her warfarin when I saw her for her preop on 3/15/2022 which was only 2 days before surgery was scheduled for.  Her INR ended up being over 4.  She was called and told to eat lots of leafy green and get her INR rechecked the following day.      Preop Questions 3/15/2022   1. Have you ever had a heart attack or stroke? No   2. Have you ever had surgery on your heart or blood vessels, such as a stent placement, a coronary artery bypass, or surgery on an artery in your head, neck, heart, or legs? No   3. Do you have chest pain with activity? No   4. Do you have a history of  heart failure? No   5. Do you currently have a cold, bronchitis or symptoms of other infection? No   6. Do you have a cough, shortness of breath, or wheezing? No   7. Do you or anyone in your family have previous history of blood clots? No   8. Do you or does anyone in your family have a serious bleeding problem such as prolonged bleeding following surgeries or cuts? No   9. Have you ever had problems with anemia  or been told to take iron pills? No   10. Have you had any abnormal blood loss such as black, tarry or bloody stools, or abnormal vaginal bleeding? No   11. Have you ever had a blood transfusion? No   12. Are you willing to have a blood transfusion if it is medically needed before, during, or after your surgery? Yes   13. Have you or any of your relatives ever had problems with anesthesia? No   14. Do you have sleep apnea, excessive snoring or daytime drowsiness? No   15. Do you have any artifical heart valves or other implanted medical devices like a pacemaker, defibrillator, or continuous glucose monitor? No   16. Do you have artificial joints? No   17. Are you allergic to latex? No     Health Care Directive:  Patient does not have a Health Care Directive or Living Will: Patient states has Advance Directive and will bring in a copy to clinic.    Preoperative Review of :   reviewed - no record of controlled substances prescribed.    Review of Systems  CONSTITUTIONAL: NEGATIVE for fever, chills  POSITIVE change in weight(loss)  INTEGUMENTARY/SKIN: POSITIVE for changing lesion of skin and lumps or bumps at left shoulder  EYES: NEGATIVE for vision changes or irritation  ENT/MOUTH: NEGATIVE for ear, mouth and throat problems  RESP: NEGATIVE for significant cough or SOB  BREAST: POSITIVE for hx of left breast ca with radiation changes  CV: NEGATIVE for chest pain, palpitations or peripheral edema  GI: NEGATIVE for nausea, abdominal pain, heartburn, or change in bowel habits  : NEGATIVE for frequency, dysuria, or hematuria  MUSCULOSKELETAL:POSITIVE  for left shoulder pain  NEURO: NEGATIVE for weakness, dizziness or paresthesias  ENDOCRINE: NEGATIVE for temperature intolerance, skin/hair changes  HEME: NEGATIVE for bleeding problems  PSYCHIATRIC: NEGATIVE for changes in mood or affect    Patient Active Problem List    Diagnosis Date Noted     Lymphedema of left upper extremity 03/15/2022     Priority: Medium      Chronic deep vein thrombosis (DVT) of axillary vein (H) 03/15/2022     Priority: Medium     Chronic kidney disease, stage 3a (H) 11/09/2021     Priority: Medium     Headache      Priority: Medium     Created by Conversion         History of left breast cancer 05/08/2019     Priority: Medium     Localization-related focal epilepsy with simple partial seizures (H) 04/19/2019     Priority: Medium     Encephalomalacia on imaging study 02/02/2019     Priority: Medium     Osteopenia      Priority: Medium     Created by Conversion  Replacement Utility updated for latest IMO load         Essential Hypercholesterolemia      Priority: Medium     Created by Conversion         Essential hypertension      Priority: Medium     Created by Conversion  Replacement Utility updated for latest IMO load         Chronic atrial fibrillation (H)      Priority: Medium     Esophageal reflux      Priority: Medium     Created by Conversion         Convulsions, unspecified convulsion type (H)      Priority: Medium     Meningioma (H)      Priority: Medium     Elevated LFTs 04/22/2016     Priority: Medium      Past Medical History:   Diagnosis Date     Acute respiratory failure, unspecified whether with hypoxia or hypercapnia (H)      Atrial fibrillation (H)      Breast cancer (H) 3-2014     Breast cancer of lower-outer quadrant of left female breast (H)     Created by Conversion      Closed displaced fracture of left pubis, initial encounter (H)      Closed fracture of spinous process of lumbar vertebra (H) 9/9/2017     Encephalopathy      GERD (gastroesophageal reflux disease)      HTN (hypertension)      Hx of radiation therapy 5-2014    Left Breast     Hypercholesterolemia      Intraabdominal hemorrhage 9/9/2017     Osteopenia      Pelvic fracture (H) 9/9/2017     Seizures (H)      Skin cancer      Transient alteration of awareness      Past Surgical History:   Procedure Laterality Date     ARTHROSCOPY KNEE Right 2009     BIOPSY BREAST Left  4/21/2021    Procedure: LEFT BREAST BIOPSY TIMES 2;  Surgeon: Didier Durán MD;  Location: [facility] OR;  Service: General     LUMPECTOMY BREAST Left 3-2014     [facility]  7/27/2017          SKIN CANCER EXCISION       TENDON RELEASE       Current Outpatient Medications   Medication Sig Dispense Refill     atenolol (TENORMIN) 100 MG tablet Take 1 tablet (100 mg) by mouth daily 90 tablet 3     atorvastatin (LIPITOR) 20 MG tablet TAKE 1 TABLET BY MOUTH EVERYDAY AT BEDTIME 90 tablet 3     calcium-vitamin D (CALCIUM-VITAMIN D) 500 mg(1,250mg) -200 unit per tablet [CALCIUM-VITAMIN D (CALCIUM-VITAMIN D) 500 MG(1,250MG) -200 UNIT PER TABLET] Take 2 tablets by mouth every morning.        calcium-vitamin D 500 mg(1,250mg) -200 unit per tablet [CALCIUM-VITAMIN D 500 MG(1,250MG) -200 UNIT PER TABLET] Take 1 tablet by mouth at bedtime.       hydrochlorothiazide (HYDRODIURIL) 25 MG tablet TAKE 1 TABLET BY MOUTH EVERY DAY. DUE FOR MED CHECK 90 tablet 0     levETIRAcetam (KEPPRA) 500 MG tablet [LEVETIRACETAM (KEPPRA) 500 MG TABLET] Take 1,250 mg by mouth 2 (two) times a day.        lisinopril (ZESTRIL) 20 MG tablet TAKE 1 TABLET BY MOUTH EVERY DAY. DUE FOR MED CHECK 90 tablet 0     magnesium oxide (MAG-OX) 400 mg tablet [MAGNESIUM OXIDE (MAG-OX) 400 MG TABLET] Take 1 tablet (400 mg total) by mouth daily.  0     multivitamin (MULTIVITAMIN) per tablet [MULTIVITAMIN (MULTIVITAMIN) PER TABLET] Take 1 tablet by mouth daily.       omeprazole (PRILOSEC) 20 MG DR capsule TAKE 1 CAPSULE BY MOUTH EVERY DAY 90 capsule 2     warfarin ANTICOAGULANT (COUMADIN) 5 MG tablet Take 0.5-1 tablets (2.5-5 mg) by mouth daily Adjust dose per INR results as instructed. 90 tablet 1       No Known Allergies     Social History     Tobacco Use     Smoking status: Never Smoker     Smokeless tobacco: Never Used   Substance Use Topics     Alcohol use: Yes     Alcohol/week: 2.0 standard drinks     Comment: Alcoholic Drinks/day: no alcohol this summer  "    Family History   Problem Relation Age of Onset     Coronary Artery Disease Mother      No Known Problems Daughter      Cerebrovascular Disease Maternal Grandmother      Prostate Cancer Maternal Grandfather      Cerebral aneurysm Maternal Aunt      No Known Problems Paternal Aunt      Skin Cancer Son      Hereditary Breast and Ovarian Cancer Syndrome No family hx of      Breast Cancer No family hx of      Cancer No family hx of      Colon Cancer No family hx of      Endometrial Cancer No family hx of      Ovarian Cancer No family hx of      History   Drug Use No         Objective     BP (!) 144/90   Pulse 64   Ht 1.613 m (5' 3.5\")   Wt 50.3 kg (111 lb)   SpO2 96%   Breastfeeding No   BMI 19.35 kg/m      Physical Exam  General appearance - alert, in no distress and thin, frail appearing  Mental status - alert, oriented to person, place, and time, normal mood, behavior, speech, dress, motor activity, and thought processes  Eyes - pupils equal and reactive, extraocular eye movements intact  Ears - bilateral TM's and external ear canals normal, dry cerumen  Nose - normal and patent, no erythema, discharge   Mouth - covered with mask  Neck - supple, adenopathy noted left cervical, axillary, carotids upstroke normal bilaterally, no bruits, thyroid exam: thyroid is normal in size without nodules or tenderness  Chest - clear to auscultation, no wheezes, rales or rhonchi, symmetric air entry  Heart - normal rate and regular rhythm, S1 and S2 normal, systolic murmur 3/6   Abdomen - soft, nontender, nondistended, no masses   Breasts - abnormal mass palpable left upper outer area with skin changes  Pelvic - examination not indicated  Back exam - full range of motion, no tenderness, palpable spasm or pain on motion  Neurological - alert, oriented, normal speech, no focal findings or movement disorder noted, cranial nerves II through XII intact, DTR's normal and symmetric  Musculoskeletal - osteoarthritic changes noted " in both hands, abnormal exam of left shoulder, with abnormal active range of motion   Extremities - lower extremities -peripheral pulses normal, no pedal edema, no clubbing or cyanosis  Skin - LESIONS NOTED: scattered, lesions, left breast and on the left scapular area        Recent Labs   Lab Test 02/23/22  1150 01/19/22  1207 11/23/21  1148 11/09/21  1230 11/09/21  1229 04/29/21  1143 04/15/21  1222   HGB  --   --   --  12.8  --   --  13.4   PLT  --   --   --  171  --   --   --    INR 2.7* 2.8*   < >  --   --    < >  --    NA  --   --   --   --  143  --  145   POTASSIUM  --   --   --   --  3.7  --  4.1   CR  --   --   --   --  0.84  --  0.93    < > = values in this interval not displayed.        Diagnostics:  Labs pending at this time.  Results will be reviewed when available.  Recent Results (from the past 24 hour(s))   INR point of care    Collection Time: 03/15/22  7:34 AM   Result Value Ref Range    INR 4.8 (H) 0.9 - 1.1   Extra Green Top (Lithium Heparin) Tube    Collection Time: 03/15/22  7:34 AM   Result Value Ref Range    Hold Specimen JIC       No EKG required for low risk surgery (cataract, skin procedure, breast biopsy, etc).    Revised Cardiac Risk Index (RCRI):  The patient has the following serious cardiovascular risks for perioperative complications:   - High risk surgery (>5% cardiac complication risk) = 1 point   - Cerebrovascular Disease (TIA or CVA) = 1 point     RCRI Interpretation: 1 point: Class II (low risk - 0.9% complication rate)           Signed Electronically by: Loli Alberts MD  Copy of this evaluation report is provided to requesting physician.

## 2022-03-16 NOTE — PROGRESS NOTES
ANTICOAGULATION MANAGEMENT     Elvira Bush 81 year old female is on warfarin with supratherapeutic INR result. (Goal INR 2.0-3.0)    Recent labs: (last 7 days)     03/16/22  1240   INR 3.5*       ASSESSMENT       Source(s): Chart Review, Patient/Caregiver Call and Template       Warfarin doses taken: Warfarin taken as instructed - held dose last night    Diet: Increased greens/vitamin K in diet as planned    New illness, injury, or hospitalization: No    Medication/supplement changes: None noted    Signs or symptoms of bleeding or clotting: No    Previous INR: Supratherapeutic    Additional findings: Upcoming surgery/procedure biopsy 3/17. will hold dose of warfarin again tonight and encourged to eat greens       PLAN     Recommended plan for temporary change(s) affecting INR     Dosing Instructions: Hold dose then continue your current warfarin dose with next INR in 1 week       Summary  As of 3/16/2022    Full warfarin instructions:  3/16: Hold; Otherwise 2.5 mg every Tue; 5 mg all other days   Next INR check:  3/25/2022             Telephone call with Elvira who agrees to plan and repeated back plan correctly    Lab visit scheduled    Education provided: Impact of vitamin K foods on INR, Goal range and significance of current result, Importance of therapeutic range, Importance of following up at instructed interval and Importance of notifying clinic of upcoming surgeries and procedures 2 weeks in advance    Plan made per [facility] anticoagulation protocol    Leonie Yee RN  Anticoagulation Clinic  3/16/2022    _______________________________________________________________________     Anticoagulation Episode Summary     Current INR goal:  2.0-3.0   TTR:  69.2 % (1 y)   Target end date:  Indefinite   Send INR reminders to:  LAINE ENRIQUEZ       Comments:           Anticoagulation Care Providers     Provider Role Specialty Phone number    Loli Alberts MD Referring Family Medicine [phone]

## 2022-03-22 NOTE — TELEPHONE ENCOUNTER
Contact First & Last Name if other than the patient involved: PT  Main reason for the request: FYI on CT results, no pt call needed unless determined otherwise  Expecting call back:NO  May leave message: YES  Please contact Patient at Home  Best time to call back: only if needed  [phone] (home), There is no work phone number on file.  Alternate phone number: n/a  Keila King

## 2022-03-24 NOTE — TELEPHONE ENCOUNTER
I have no idea what this is concerning.  I do not know what CT she is referring to.  We do not have its result.  I'm sure it has to do with her left arm lymphedema. Can you please call her and find out what she called about?

## 2022-03-25 NOTE — PROGRESS NOTES
ANTICOAGULATION MANAGEMENT      Elvira Bush due for annual renewal of referral to anticoagulation monitoring. Order pended for your review and signature.      ANTICOAGULATION SUMMARY      Warfarin indication(s)     Atrial fibrillation  DVT    Heart valve present?  NO       Current goal range   INR: 2.0-3.0     Goal appropriate for indication? Yes, INR 2-3 appropriate for hx of DVT, PE, hypercoagulable state, Afib, LVAD, or bileaflet AVR without risk factors     Current duration of therapy Indefinite/long term therapy   Time in Therapeutic Range (TTR)  (Goal > 60%) 68%       Office visit with referring provider's group within last year yes on 2/28/22       Leonie Yee RN

## 2022-03-25 NOTE — PROGRESS NOTES
ANTICOAGULATION MANAGEMENT     Elvira Bush 81 year old female is on warfarin with therapeutic INR result. (Goal INR 2.0-3.0)    Recent labs: (last 7 days)     03/25/22  1146   INR 2.5*       ASSESSMENT       Source(s): Chart Review, Patient/Caregiver Call and Template       Warfarin doses taken: Warfarin taken as instructed    Diet: No new diet changes identified    New illness, injury, or hospitalization: No. Had skin biopsy 3/17, has not heard results yet.     Medication/supplement changes: None noted    Signs or symptoms of bleeding or clotting: No    Previous INR: Supratherapeutic    Additional findings: None       PLAN     Recommended plan for no diet, medication or health factor changes affecting INR     Dosing Instructions: Continue your current warfarin dose with next INR in 2 weeks       Summary  As of 3/25/2022    Full warfarin instructions:  2.5 mg every Tue; 5 mg all other days   Next INR check:  4/8/2022             Telephone call with Elvira who verbalizes understanding and agrees to plan    Lab visit scheduled    Education provided: Goal range and significance of current result and Importance of therapeutic range    Plan made per [facility] anticoagulation protocol    Leonie Yee, RN  Anticoagulation Clinic  3/25/2022    _______________________________________________________________________     Anticoagulation Episode Summary     Current INR goal:  2.0-3.0   TTR:  68.0 % (1 y)   Target end date:  Indefinite   Send INR reminders to:  [facility]    Indications    Chronic atrial fibrillation (H) [I48.20]  Chronic deep vein thrombosis (DVT) of axillary vein (H) [I82.A29]           Comments:           Anticoagulation Care Providers     Provider Role Specialty Phone number    Loli Alberts MD Referring Family Medicine [phone]

## 2022-03-31 NOTE — TELEPHONE ENCOUNTER
"Routing refill request to provider for review/approval because:  Labs out of range:  BP    Last Written Prescription Date:  1/5/22  Last Fill Quantity: 90,  # refills: 0   Last office visit provider:  3/15/22     Last Written Prescription Date:  1/5/22  Last Fill Quantity: 90,  # refills: 0     Requested Prescriptions   Pending Prescriptions Disp Refills     hydrochlorothiazide (HYDRODIURIL) 25 MG tablet [Pharmacy Med Name: HYDROCHLOROTHIAZIDE 25 MG TAB] 90 tablet 0     Sig: TAKE 1 TABLET BY MOUTH EVERY DAY. DUE FOR MED CHECK       Diuretics (Including Combos) Protocol Failed - 3/29/2022 12:28 AM        Failed - Blood pressure under 140/90 in past 12 months     BP Readings from Last 3 Encounters:   03/15/22 (!) 144/90   02/28/22 (!) 142/80   01/13/22 (!) 148/81                 Passed - Recent (12 mo) or future (30 days) visit within the authorizing provider's specialty     Patient has had an office visit with the authorizing provider or a provider within the authorizing providers department within the previous 12 mos or has a future within next 30 days. See \"Patient Info\" tab in inbasket, or \"Choose Columns\" in Meds & Orders section of the refill encounter.              Passed - Medication is active on med list        Passed - Patient is age 18 or older        Passed - No active pregancy on record        Passed - Normal serum creatinine on file in past 12 months     Recent Labs   Lab Test 03/15/22  0734   CR 0.96              Passed - Normal serum potassium on file in past 12 months     Recent Labs   Lab Test 03/15/22  0734   POTASSIUM 3.5                    Passed - Normal serum sodium on file in past 12 months     Recent Labs   Lab Test 03/15/22  0734                 Passed - No positive pregnancy test in past 12 months           lisinopril (ZESTRIL) 20 MG tablet [Pharmacy Med Name: LISINOPRIL 20 MG TABLET] 90 tablet 0     Sig: TAKE 1 TABLET BY MOUTH EVERY DAY. *DUE FOR MED CHECK*       ACE Inhibitors " "(Including Combos) Protocol Failed - 3/29/2022 12:28 AM        Failed - Blood pressure under 140/90 in past 12 months     BP Readings from Last 3 Encounters:   03/15/22 (!) 144/90   02/28/22 (!) 142/80   01/13/22 (!) 148/81                 Passed - Recent (12 mo) or future (30 days) visit within the authorizing provider's specialty     Patient has had an office visit with the authorizing provider or a provider within the authorizing providers department within the previous 12 mos or has a future within next 30 days. See \"Patient Info\" tab in inbasket, or \"Choose Columns\" in Meds & Orders section of the refill encounter.              Passed - Medication is active on med list        Passed - Patient is age 18 or older        Passed - No active pregnancy on record        Passed - Normal serum creatinine on file in past 12 months     Recent Labs   Lab Test 03/15/22  0734   CR 0.96       Ok to refill medication if creatinine is low          Passed - Normal serum potassium on file in past 12 months     Recent Labs   Lab Test 03/15/22  0734   POTASSIUM 3.5             Passed - No positive pregnancy test within past 12 months             Gabbie Gutierrez RN 03/31/22 8:51 AM  "

## 2022-04-06 NOTE — PROGRESS NOTES
Action    Action Taken 4/6/2022 11:36AM KEB   I called pt Pat - unavailable. I tried calling again.. no luck.     I pulled her HE images into PACS.     I called pt Pat again - her previous radiation treatment with [facility] back in March 2014 4/11/2022 12:02pm KEB   I called [facility] [phone] - they pulled up pt Pat's reports from March 4th PET/CT.  I was also transferred to [facility]'s IMG Dept - I left a detailed vm for Apoorva requesting images to be pushed to  PACS ASAP.     12:10pm KEB   I called [facility] to see if they can fax over records. [phone] - they looked up the pt's chart- they don't need an YAMILE form. They have have one encounter. Records will be faxed over immediately!     4/11/2022 12:18pm KEB   I faxed the March 4th 2022 PET scan report from [facility] to [facility]    I resolved some imaging in PACS.     I faxed the one encounter from [facility] to [facility].     1:30pm KEB   I faxed [facility] and [facility] image report to [facility]'s nurse fax line.     I resolved the PET scan in PACS    4/11/2022 2:58PM KEB   I called [facility] again Ph: [phone] #[phone]- I left a detailed vm for [facility] requesting everything (full fused scans) to be pushed to  PACS     4/11/2022 3:10pm KEB   [facility] called back and asked me to call their facility in the [facility] Ph: [phone]- they looked up the patient in their system- they have two scans... they will try pushing the images and also transferred me to a tech     I left a vm for the tech- requesting a call back.     4/11/2022 3:39PM KEB   I received a call back from [facility]. They will try pushing the full series of images.      RECORDS STATUS - BREAST    RECORDS REQUESTED FROM: [facility]/ [facility]    DATE REQUESTED: 4/11/2022   NOTES DETAILS STATUS   OFFICE NOTE from referring provider Complete Epic   Ref by Dr. Tang.   OFFICE NOTE from medical oncologist Complete 8/2/2021    Malignant neoplasm of lower-outer quadrant  of left breast of female, estrogen receptor negative (H)  7/16/2020    Malignant neoplasm of lower-outer quadrant of left breast of female, estrogen receptor negative (H)  More in [facility]   OFFICE NOTE from surgeon Complete See Breast Biopsy in EPIC   OFFICE NOTE from radiation oncologist     DISCHARGE SUMMARY from hospital     DISCHARGE REPORT from the ER     OPERATIVE REPORT Complete See Breast Biopsy in EPIC   MEDICATION LIST Complete [facility]   CLINICAL TRIAL TREATMENTS TO DATE     LABS     REQUEST BLOCKS FOR ALL BREAST CANCER PTS     PATHOLOGY REPORTS  (Tissue diagnosis, Stage, ER/GA percentage positive and intensity of staining, HER2 IHC, FISH, and all biopsies from breast and any distant metastasis)                 Complete 4/21/2022   A) LEFT MEDIAL BREAST BIOPSY:         -   BENIGN BREAST TISSUE WITH NO EVIDENCE OF MALIGNANCY ([id number])     B) LEFT LATERAL BREAST BIOPSY:         -   BENIGN BREAST TISSUE WITH NO EVIDENCE OF MALIGNANCY ([id number])   Electronically signed by Malcom Barreto MD on 4/27/2021 at  3:10 PM     3/2014   A) LEFT BREAST, WIRE-GUIDED LUMPECTOMY:          HISTOLOGIC TYPE OF CARCINOMA:             INVASIVE DUCTAL CARCINOMA WITH MICROPAPILLARY PATTERN             IN SITU DUCTAL CARCINOMA     3/2014   LEFT BREAST, UPPER OUTER QUADRANT, STEREOTACTIC CORE BIOPSY:          HISTOLOGIC TYPE:             1) INVASIVE DUCTAL CARCINOMA             2) DUCTAL CARCINOMA IN-SITU, SOLID TYPE WITHOUT                COMEDONECROSIS, 5% OF NEOPLASTIC TISSUE   GENONOMIC TESTING     TYPE:   (Next Generation Sequencing, including Foundation One testing, and Oncotype score)     IMAGING (NEED IMAGES & REPORT)     CT SCANS     MRI     MAMMO Complete 2/17/2021 more in PACS   ULTRASOUND Complete US Breast Left Limited 2/17/2021 more in PACS   PET     BONE SCAN     BRAIN MRI

## 2022-04-06 NOTE — PROGRESS NOTES
ANTICOAGULATION MANAGEMENT     Elvira Bush 82 year old female is on warfarin with therapeutic INR result. (Goal INR 2.0-3.0)    Recent labs: (last 7 days)     04/06/22  1143   INR 2.9*       ASSESSMENT       Source(s): Chart Review and Template       Warfarin doses taken: Warfarin taken as instructed    Diet: Decreased greens/vitamin K in diet; plans to resume previous intake    New illness, injury, or hospitalization: No    Medication/supplement changes: None noted    Signs or symptoms of bleeding or clotting: No    Previous INR: Therapeutic last visit; previously outside of goal range    Additional findings: None       PLAN     Recommended plan for no diet, medication or health factor changes affecting INR     Dosing Instructions: continue your current warfarin dose with next INR in 3 -4 weeks       Summary  As of 4/6/2022    Full warfarin instructions:  2.5 mg every Tue; 5 mg all other days   Next INR check:  5/4/2022             Telephone call with Kym who verbalizes understanding and agrees to plan    Patient elected to schedule next visit 5/5/22    Education provided: Potential interaction between warfarin and alcohol, Importance of therapeutic range and Contact [phone] with any changes, questions or concerns.     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

## 2022-04-11 NOTE — PROGRESS NOTES
Patient here ambulatory accompanied by daughter, Na, for radiation consult for her recurrent left breast cancer.  Patient states she has had swelling in the left arm since last year and has seen many doctors.  Has a compression sleeve but it is to tight to wear sometimes.  Previous radiation to left breast done in 2014 by Dr. Escalona at [facility].  15 minutes spent in review of radiation process and potential side effects.  Written information given for review.  Seen by Dr. Escalona.  Plan return to clinic for follow up and/or CT simulation as directed by physician.    Radiation Therapy Patient Education    Person involved with teaching: Patient and Daughter    Patient educational needs for self management of treatment-related side effects assessment completed.  EPIC Patient Ed tab contains Patient Learning Assessment    Education Materials Given  Radiation Therapy and You, Understanding Radiation Therapy, Radiation Therapy Team, Radiation Therapy Treatment, Radiation Therapy: Managing Short-Term Side Effects, Skin Care During Radiation Therapy, Radiation Therapy: Your Daily Life, Radiation Therapy to the Breast Guidelines, Welcome Letter, Insurance PA Information and Map [facility] Parking    Educational Topics Discussed  Side effects expected and Skin care    Response To Teaching  More review necessary    GYN Only  Vaginal Dilator-given and educated: N/A    Referrals sent: None    Chemotherapy?  No

## 2022-04-11 NOTE — PROGRESS NOTES
[facility] Radiation Oncology Consult Note      Patient: Elvira Bush  MRN: [id number]  Date of Service: 04/11/2022    Assessment:       ICD-10-CM    1. Lymphedema of left upper extremity  I89.0    2. Recurrent breast cancer, left (H)  C50.912 Palliative Care Referral     acetaminophen-codeine (TYLENOL #3) 300-30 MG tablet   3. Cancer related pain  G89.3 acetaminophen-codeine (TYLENOL #3) 300-30 MG tablet     gabapentin (NEURONTIN) 100 MG capsule        Impression/Plan:   1. 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  I have personally reviewed PET CT from 3/4/2022, these were obtained from an outside imaging facility and I was only able to review the CT and nuclear imaging separate. Currently she is anticoagulated due to afib and has met with vascular medicine who felt DVT to likely be chronic condition, her LUE is largely immobile.    On clinical exam, I was able to appreciate left supraclavicular  LN. There is skin discoloration which extends from left chest, axillary tail and around to scapula which is concerning for metastatic disease.Given disease on her back and the severe limitation of ROM, unable to consider radiation at this time. Discussed with Dr Tang that will start with chemotherapy and see how much shrinkage and perhaps due consolidative of a smaller area.     Given the overall clinical picture, the patient and daughter, Na, understand we are going for palliative therapy with focus on disease stability rather than cure.  Additionally, with these LNs previously irradiated in 2014 (no LN biopsy), my concern is if we treat this area again her edema and associated discomfort may worsen. Therefore I feel she will be best served by targeted chemotherapy to help decrease overall tumor and if good response may consider palliative radiation, patient understanding and in agreement with  plan. Dr. Tang made aware.     2. The patient has been fairly reluctant to proceed with many systemic therapies out of concern for side effects in the past therefore I have also provided her with a referral to palliative care.    3. Encouraged patient to keep F/U with PT to help with ROM and edema, may benefit from OT at some point to help with AODL.     4. Patient with issues tolerating oxycodone, will try tylenol #3 for pain control.    5. Tingling and numbness in hand so trial Gabapentin. To start 100mg BID and ramp up to 200mg BID, then 300mg BID if tolerated. If patient noticing increased drowsiness with medication, to call and have prescription adjusted.     6. Return to myself PRN.       Subjective:   This patient was referred to myself by Dr. Ernestina Tang for consideration of radiation therapy.    HPI:  Elvira Bush is a 82 year old female on anticoagulation for afib, with recurrent left breast cancer.     The patient was originally diagnosed with triple negative left breast cancer in 2014. She was treated with lumpectomy (no LN biopsy) and radiation therapy (6040 cGy over 33 fractions), however declined recommended chemotherapy + adjuvant hormonal therapy. She has been followed by imaging since. In February 2021, there was concern for diffuse skin thickening over the left breast. A biopsy showed benign breast tissue without evidence of malignancy.     Over a lengthy period of time, patient had been gradually developing left arm swelling with associated pain. An US LUE in January 2022 demonstrated DVT occluding left axillary vein and subclavian veins. She had seen vascular medicine who felt LUE edema likely chronic but did offer d-dimer test to evaluate if any acute component which could be acted upon if within therapeutic window. Patient declined further procedures.     She was evaluated by orthopedics who was concerned for recurrence of breast cancer. A PET CT was performed on 3/4/2022, foci  of FDG avidity measuring 3.6 x 2.9 x 2.8 cm within the left breast. There was also 2 foci of prominent uptake corresponding to possible lymphadenopathy on the CT portion of the scan with additional diffuse ill defined soft tissue infiltrative appearance involving the pectoris musculature, tenesmus dorsi, and soft tissue of the edematous LUE. No evidence of distant FDG avid disease. During this time she also had an LUE EMG performed which did showed evidence of a left brachial plexopathy involving with either a partial upper trunk lesion or possibly at the anterior and posterior division just distal to the upper trunk.    The patient had a biopsy of her skin performed on 3/17/2022, biopsy from skin of left back showed poorly differentiated invasive carcinoma, triple negative, skin of the left chest returned with poorly differentiated invasive carcinoma favoring breast primary but possible metastatic carcinoma cannot be entirely excluded.     The patient presents today to discuss radiation therapy. Significant swelling LUE witn very limited LUE ROM. Prefers pain control with OTC tylenol rather than oxycodone due to oversedation. Tingling and numbness in hands.     Past Medical History:   Diagnosis Date     Acute respiratory failure, unspecified whether with hypoxia or hypercapnia (H)      Atrial fibrillation (H)      Breast cancer (H) 3-2014     Breast cancer of lower-outer quadrant of left female breast (H)     Created by Conversion      Closed displaced fracture of left pubis, initial encounter (H)      Closed fracture of spinous process of lumbar vertebra (H) 9/9/2017     Encephalopathy      GERD (gastroesophageal reflux disease)      HTN (hypertension)      Hx of radiation therapy 5-2014    Left Breast     Hypercholesterolemia      Intraabdominal hemorrhage 9/9/2017     Osteopenia      Pelvic fracture (H) 9/9/2017     Seizures (H)      Skin cancer      Transient alteration of awareness      Past Surgical History:    Procedure Laterality Date     ARTHROSCOPY KNEE Right 2009     BIOPSY BREAST Left 4/21/2021    Procedure: LEFT BREAST BIOPSY TIMES 2;  Surgeon: Didier Durán MD;  Location: [facility] Main OR;  Service: General     LUMPECTOMY BREAST Left 3-2014     [facility]  7/27/2017          SKIN CANCER EXCISION       TENDON RELEASE       Current Outpatient Medications   Medication     acetaminophen-codeine (TYLENOL #3) 300-30 MG tablet     atenolol (TENORMIN) 100 MG tablet     atorvastatin (LIPITOR) 20 MG tablet     calcium-vitamin D (CALCIUM-VITAMIN D) 500 mg(1,250mg) -200 unit per tablet     calcium-vitamin D 500 mg(1,250mg) -200 unit per tablet     gabapentin (NEURONTIN) 100 MG capsule     hydrochlorothiazide (HYDRODIURIL) 25 MG tablet     levETIRAcetam (KEPPRA) 500 MG tablet     lisinopril (ZESTRIL) 20 MG tablet     magnesium oxide (MAG-OX) 400 mg tablet     Multiple Vitamins-Minerals (PRESERVISION AREDS 2+MULTI VIT PO)     multivitamin (MULTIVITAMIN) per tablet     omeprazole (PRILOSEC) 20 MG DR capsule     warfarin ANTICOAGULANT (COUMADIN) 5 MG tablet     oxyCODONE (ROXICODONE) 5 MG tablet     No current facility-administered medications for this visit.     Patient has no known allergies.  Social History     Socioeconomic History     Marital status:      Spouse name: Not on file     Number of children: Not on file     Years of education: Not on file     Highest education level: Not on file   Occupational History     Not on file   Tobacco Use     Smoking status: Never Smoker     Smokeless tobacco: Never Used   Substance and Sexual Activity     Alcohol use: Yes     Alcohol/week: 2.0 standard drinks     Comment: Alcoholic Drinks/day: no alcohol this summer     Drug use: No     Sexual activity: Not Currently   Other Topics Concern     Not on file   Social History Narrative     Not on file     Social Determinants of Health     Financial Resource Strain: Not on file   Food Insecurity: Not on file   Transportation Needs:  Not on file   Physical Activity: Not on file   Stress: Not on file   Social Connections: Not on file   Intimate Partner Violence: Not on file   Housing Stability: Not on file     , never smoker. Lives alone. Endorses 2 alcoholic beverages weekly.   Family History   Problem Relation Age of Onset     Coronary Artery Disease Mother      No Known Problems Daughter      Cerebrovascular Disease Maternal Grandmother      Prostate Cancer Maternal Grandfather      Cerebral aneurysm Maternal Aunt      No Known Problems Paternal Aunt      Skin Cancer Son      Hereditary Breast and Ovarian Cancer Syndrome No family hx of      Breast Cancer No family hx of      Cancer No family hx of      Colon Cancer No family hx of      Endometrial Cancer No family hx of      Ovarian Cancer No family hx of      Maternal grandfather with prostate cancer. Son with skin cancer.     ROS:  Reviewed with Elvira Bush today.      General  Constitutional  Constitutional (WDL): Exceptions to WDL  Fatigue: Fatigue relieved by rest  EENT  Eye Disorders  Eye Disorder (WDL): All eye disorder elements are within defined limits (wears glasses)  Ear Disorders  Ear Disorder (WDL): All ear disorder elements are within defined limits  Respiratory    Respiratory  Respiratory (WDL): All respiratory elements are within defined limits  Cardiovascular  Cardiovascular  Cardiovascular (WDL): All cardiovascular elements are within defined limits  Gastrointestinal  Gastrointestinal  Gastrointestinal (WDL): Exceptions to WDL  Anorexia: Loss of appetite without alteration in eating habits  Musculoskeletal  Musculoskeletal and Connective Tissue Disorders  Musculoskeletal & Connective (WDL): Exceptions to WDL  Generalized Muscle Weakness: Symptomatic OR perceived by patient but not evident on physical exam (left arm)  Myalgia: Mild pain (left arm)  Integumentary              Integumentary  Integumentary (WDL): Exceptions to WDL (healing skin  biopsies)  Neurological  Neurosensory  Neurosensory (WDL): Exceptions to WDL  Peripheral Motor Neuropathy: Moderate symptoms OR limiting instrumental ADL (let hand)  Peripheral Sensory Neuropathy: Moderate symptoms OR limiting instrumental ADL (left hand)  Genitourinary/Reproductive  Genitourinary  Genitourinary (WDL): All genitourinary elements are within defined limits  Lymphatic   Lymph System Disorders  Lymph (WDL): Exceptions to WDL  Lymphedema: Trace thickening or faint discoloration (left arm)  Lymph Node Pain: Mild pain (left arm)  Patient Coping  Patient Coping: Accepting;Open/discussion  Pain   Pain Score: No Pain (0)   AUA Assessment                                                                         Accompanied by  Accompanied By: family (daughter Na)        Objective:        Exam:    Vitals:    04/11/22 1359 04/11/22 1400   BP: (!) 151/84    Pulse: 77    Resp: 16    Temp: 98.1  F (36.7  C)    TempSrc: Oral    SpO2: 98%    Weight: 50.7 kg (111 lb 12.8 oz)    PainSc:  No Pain (0)   PainLoc:  Arm       GENERAL: No acute distress. Cooperative in conversation. Mask on.   RESP: Normal respiratory effort.   Breasts: Biopsy positive tumor present on left chest extending around to back   ABD: Soft, nontender.  NEURO: Non focal. Alert and oriented x3.   MSK: Very limited ROM LUE.   Lymph: Significant edema within LUE.   PSYCH: Within normal limits. No depression or anxiety.  SKIN: Warm dry intact.       Recent Labs:   Recent Results (from the past 168 hour(s))   INR point of care   Result Value Ref Range    INR 2.9 (H) 0.9 - 1.1       Imaging: Outside imaging personally reviewed including US LUE, PET CT.     Pathology:   Outside pathology personally reviewed through electronic medical record.       100 minutes spent on the date of the encounter doing chart review, review of outside records, review of test results, interpretation of tests, patient visit, documentation, discussion with other provider(s) and  discussion with family, personal review of imaging.        I, Loli Escalona MD personally performed the services described in this documentation, as scribed by Steve Starkey in my presence, and it is both accurate and complete.    Signed by: Loli Escalona MD, MPH

## 2022-04-11 NOTE — LETTER
4/11/2022         RE: Elvira Bush  [address]  [facility] [address]        Dear Colleague,    Thank you for referring your patient, Elvira Bush, to the [facility] RADIATION ONCOLOGY [place]. Please see a copy of my visit note below.          [facility] Radiation Oncology Consult Note      Patient: Elvira Bush  MRN: [id number]  Date of Service: 04/11/2022    Assessment:       ICD-10-CM    1. Lymphedema of left upper extremity  I89.0    2. Recurrent breast cancer, left (H)  C50.912 Palliative Care Referral     acetaminophen-codeine (TYLENOL #3) 300-30 MG tablet   3. Cancer related pain  G89.3 acetaminophen-codeine (TYLENOL #3) 300-30 MG tablet     gabapentin (NEURONTIN) 100 MG capsule        Impression/Plan:   1. 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  I have personally reviewed PET CT from 3/4/2022, these were obtained from an outside imaging facility and I was only able to review the CT and nuclear imaging separate. Currently she is anticoagulated due to afib and has met with vascular medicine who felt DVT to likely be chronic condition, her LUE is largely immobile.    On clinical exam, I was able to appreciate left supraclavicular  LN. There is skin discoloration which extends from left chest, axillary tail and around to scapula which is concerning for metastatic disease.Given disease on her back and the severe limitation of ROM, unable to consider radiation at this time. Discussed with Dr Tang that will start with chemotherapy and see how much shrinkage and perhaps due consolidative of a smaller area.     Given the overall clinical picture, the patient and daughter, Na, understand we are going for palliative therapy with focus on disease stability rather than cure.  Additionally, with these LNs previously irradiated in 2014 (no LN biopsy), my concern is if  we treat this area again her edema and associated discomfort may worsen. Therefore I feel she will be best served by targeted chemotherapy to help decrease overall tumor and if good response may consider palliative radiation, patient understanding and in agreement with plan. Dr. Tang made aware.     2. The patient has been fairly reluctant to proceed with many systemic therapies out of concern for side effects in the past therefore I have also provided her with a referral to palliative care.    3. Encouraged patient to keep F/U with PT to help with ROM and edema, may benefit from OT at some point to help with AODL.     4. Patient with issues tolerating oxycodone, will try tylenol #3 for pain control.    5. Tingling and numbness in hand so trial Gabapentin. To start 100mg BID and ramp up to 200mg BID, then 300mg BID if tolerated. If patient noticing increased drowsiness with medication, to call and have prescription adjusted.     6. Return to myself PRN.       Subjective:   This patient was referred to myself by Dr. Ernestina Tang for consideration of radiation therapy.    HPI:  Elvira Bush is a 82 year old female on anticoagulation for afib, with recurrent left breast cancer.     The patient was originally diagnosed with triple negative left breast cancer in 2014. She was treated with lumpectomy (no LN biopsy) and radiation therapy (6040 cGy over 33 fractions), however declined recommended chemotherapy + adjuvant hormonal therapy. She has been followed by imaging since. In February 2021, there was concern for diffuse skin thickening over the left breast. A biopsy showed benign breast tissue without evidence of malignancy.     Over a lengthy period of time, patient had been gradually developing left arm swelling with associated pain. An US LUE in January 2022 demonstrated DVT occluding left axillary vein and subclavian veins. She had seen vascular medicine who felt LUE edema likely chronic but did offer  d-dimer test to evaluate if any acute component which could be acted upon if within therapeutic window. Patient declined further procedures.     She was evaluated by orthopedics who was concerned for recurrence of breast cancer. A PET CT was performed on 3/4/2022, foci of FDG avidity measuring 3.6 x 2.9 x 2.8 cm within the left breast. There was also 2 foci of prominent uptake corresponding to possible lymphadenopathy on the CT portion of the scan with additional diffuse ill defined soft tissue infiltrative appearance involving the pectoris musculature, tenesmus dorsi, and soft tissue of the edematous LUE. No evidence of distant FDG avid disease. During this time she also had an LUE EMG performed which did showed evidence of a left brachial plexopathy involving with either a partial upper trunk lesion or possibly at the anterior and posterior division just distal to the upper trunk.    The patient had a biopsy of her skin performed on 3/17/2022, biopsy from skin of left back showed poorly differentiated invasive carcinoma, triple negative, skin of the left chest returned with poorly differentiated invasive carcinoma favoring breast primary but possible metastatic carcinoma cannot be entirely excluded.     The patient presents today to discuss radiation therapy. Significant swelling LUE witn very limited LUE ROM. Prefers pain control with OTC tylenol rather than oxycodone due to oversedation. Tingling and numbness in hands.     Past Medical History:   Diagnosis Date     Acute respiratory failure, unspecified whether with hypoxia or hypercapnia (H)      Atrial fibrillation (H)      Breast cancer (H) 3-2014     Breast cancer of lower-outer quadrant of left female breast (H)     Created by Conversion      Closed displaced fracture of left pubis, initial encounter (H)      Closed fracture of spinous process of lumbar vertebra (H) 9/9/2017     Encephalopathy      GERD (gastroesophageal reflux disease)      HTN  (hypertension)      Hx of radiation therapy 5-2014    Left Breast     Hypercholesterolemia      Intraabdominal hemorrhage 9/9/2017     Osteopenia      Pelvic fracture (H) 9/9/2017     Seizures (H)      Skin cancer      Transient alteration of awareness      Past Surgical History:   Procedure Laterality Date     ARTHROSCOPY KNEE Right 2009     BIOPSY BREAST Left 4/21/2021    Procedure: LEFT BREAST BIOPSY TIMES 2;  Surgeon: Didier Durán MD;  Location: [facility] OR;  Service: General     LUMPECTOMY BREAST Left 3-2014     [facility]  7/27/2017          SKIN CANCER EXCISION       TENDON RELEASE       Current Outpatient Medications   Medication     acetaminophen-codeine (TYLENOL #3) 300-30 MG tablet     atenolol (TENORMIN) 100 MG tablet     atorvastatin (LIPITOR) 20 MG tablet     calcium-vitamin D (CALCIUM-VITAMIN D) 500 mg(1,250mg) -200 unit per tablet     calcium-vitamin D 500 mg(1,250mg) -200 unit per tablet     gabapentin (NEURONTIN) 100 MG capsule     hydrochlorothiazide (HYDRODIURIL) 25 MG tablet     levETIRAcetam (KEPPRA) 500 MG tablet     lisinopril (ZESTRIL) 20 MG tablet     magnesium oxide (MAG-OX) 400 mg tablet     Multiple Vitamins-Minerals (PRESERVISION AREDS 2+MULTI VIT PO)     multivitamin (MULTIVITAMIN) per tablet     omeprazole (PRILOSEC) 20 MG DR capsule     warfarin ANTICOAGULANT (COUMADIN) 5 MG tablet     oxyCODONE (ROXICODONE) 5 MG tablet     No current facility-administered medications for this visit.     Patient has no known allergies.  Social History     Socioeconomic History     Marital status:      Spouse name: Not on file     Number of children: Not on file     Years of education: Not on file     Highest education level: Not on file   Occupational History     Not on file   Tobacco Use     Smoking status: Never Smoker     Smokeless tobacco: Never Used   Substance and Sexual Activity     Alcohol use: Yes     Alcohol/week: 2.0 standard drinks     Comment: Alcoholic Drinks/day: no  alcohol this summer     Drug use: No     Sexual activity: Not Currently   Other Topics Concern     Not on file   Social History Narrative     Not on file     Social Determinants of Health     Financial Resource Strain: Not on file   Food Insecurity: Not on file   Transportation Needs: Not on file   Physical Activity: Not on file   Stress: Not on file   Social Connections: Not on file   Intimate Partner Violence: Not on file   Housing Stability: Not on file     , never smoker. Lives alone. Endorses 2 alcoholic beverages weekly.   Family History   Problem Relation Age of Onset     Coronary Artery Disease Mother      No Known Problems Daughter      Cerebrovascular Disease Maternal Grandmother      Prostate Cancer Maternal Grandfather      Cerebral aneurysm Maternal Aunt      No Known Problems Paternal Aunt      Skin Cancer Son      Hereditary Breast and Ovarian Cancer Syndrome No family hx of      Breast Cancer No family hx of      Cancer No family hx of      Colon Cancer No family hx of      Endometrial Cancer No family hx of      Ovarian Cancer No family hx of      Maternal grandfather with prostate cancer. Son with skin cancer.     ROS:  Reviewed with Elvira jose.      General  Constitutional  Constitutional (WDL): Exceptions to WDL  Fatigue: Fatigue relieved by rest  EENT  Eye Disorders  Eye Disorder (WDL): All eye disorder elements are within defined limits (wears glasses)  Ear Disorders  Ear Disorder (WDL): All ear disorder elements are within defined limits  Respiratory    Respiratory  Respiratory (WDL): All respiratory elements are within defined limits  Cardiovascular  Cardiovascular  Cardiovascular (WDL): All cardiovascular elements are within defined limits  Gastrointestinal  Gastrointestinal  Gastrointestinal (WDL): Exceptions to WDL  Anorexia: Loss of appetite without alteration in eating habits  Musculoskeletal  Musculoskeletal and Connective Tissue Disorders  Musculoskeletal &  Connective (WDL): Exceptions to WDL  Generalized Muscle Weakness: Symptomatic OR perceived by patient but not evident on physical exam (left arm)  Myalgia: Mild pain (left arm)  Integumentary              Integumentary  Integumentary (WDL): Exceptions to WDL (healing skin biopsies)  Neurological  Neurosensory  Neurosensory (WDL): Exceptions to WDL  Peripheral Motor Neuropathy: Moderate symptoms OR limiting instrumental ADL (let hand)  Peripheral Sensory Neuropathy: Moderate symptoms OR limiting instrumental ADL (left hand)  Genitourinary/Reproductive  Genitourinary  Genitourinary (WDL): All genitourinary elements are within defined limits  Lymphatic   Lymph System Disorders  Lymph (WDL): Exceptions to WDL  Lymphedema: Trace thickening or faint discoloration (left arm)  Lymph Node Pain: Mild pain (left arm)  Patient Coping  Patient Coping: Accepting;Open/discussion  Pain   Pain Score: No Pain (0)   AUA Assessment                                                                         Accompanied by  Accompanied By: family (daughter Na)        Objective:        Exam:    Vitals:    04/11/22 1359 04/11/22 1400   BP: (!) 151/84    Pulse: 77    Resp: 16    Temp: 98.1  F (36.7  C)    TempSrc: Oral    SpO2: 98%    Weight: 50.7 kg (111 lb 12.8 oz)    PainSc:  No Pain (0)   PainLoc:  Arm       GENERAL: No acute distress. Cooperative in conversation. Mask on.   RESP: Normal respiratory effort.   Breasts: Biopsy positive tumor present on left chest extending around to back   ABD: Soft, nontender.  NEURO: Non focal. Alert and oriented x3.   MSK: Very limited ROM LUE.   Lymph: Significant edema within LUE.   PSYCH: Within normal limits. No depression or anxiety.  SKIN: Warm dry intact.       Recent Labs:   Recent Results (from the past 168 hour(s))   INR point of care   Result Value Ref Range    INR 2.9 (H) 0.9 - 1.1       Imaging: Outside imaging personally reviewed including US LUE, PET CT.     Pathology:   Outside pathology  personally reviewed through electronic medical record.       100 minutes spent on the date of the encounter doing chart review, review of outside records, review of test results, interpretation of tests, patient visit, documentation, discussion with other provider(s) and discussion with family, personal review of imaging.        I, oLli Escalona MD personally performed the services described in this documentation, as scribed by Steve Starkey in my presence, and it is both accurate and complete.    Signed by: Loli Escalona MD, MPH           Patient here ambulatory accompanied by daughter, Na, for radiation consult for her recurrent left breast cancer.  Patient states she has had swelling in the left arm since last year and has seen many doctors.  Has a compression sleeve but it is to tight to wear sometimes.  Previous radiation to left breast done in 2014 by Dr. Escalona at [facility].  15 minutes spent in review of radiation process and potential side effects.  Written information given for review.  Seen by Dr. Escalona.  Plan return to clinic for follow up and/or CT simulation as directed by physician.    Radiation Therapy Patient Education    Person involved with teaching: Patient and Daughter    Patient educational needs for self management of treatment-related side effects assessment completed.  [facility] Patient Ed tab contains Patient Learning Assessment    Education Materials Given  Radiation Therapy and You, Understanding Radiation Therapy, Radiation Therapy Team, Radiation Therapy Treatment, Radiation Therapy: Managing Short-Term Side Effects, Skin Care During Radiation Therapy, Radiation Therapy: Your Daily Life, Radiation Therapy to the Breast Guidelines, Welcome Letter, Insurance PA Information and Map [facility] Parking    Educational Topics Discussed  Side effects expected and Skin care    Response To Teaching  More review necessary    GYN Only  Vaginal Dilator-given and educated:  N/A    Referrals sent: None    Chemotherapy?  No          Again, thank you for allowing me to participate in the care of your patient.        Sincerely,        Loli Escalona MD

## 2022-04-27 NOTE — PROGRESS NOTES
RECORDS STATUS - BREAST    RECORDS REQUESTED FROM: EPIC   DATE REQUESTED: 5/19/2022   NOTES DETAILS STATUS   OFFICE NOTE from referring provider Complete Epic  Loli Escalona MD   OFFICE NOTE from medical oncologist Complete  8/2/2021 Oncology Visit Malignant neoplasm of lower-outer quadrant of left breast of female, estrogen receptor negative (H)      OFFICE NOTE from surgeon Complete See Breast Biopsy in EPIC   OFFICE NOTE from radiation oncologist     DISCHARGE SUMMARY from hospital     DISCHARGE REPORT from the ER     OPERATIVE REPORT Complete See Breast Biopsy in EPIC   MEDICATION LIST Complete [facility]   CLINICAL TRIAL TREATMENTS TO DATE     LABS     REQUEST BLOCKS FOR ALL BREAST CANCER PTS     PATHOLOGY REPORTS  (Tissue diagnosis, Stage, ER/WY percentage positive and intensity of staining, HER2 IHC, FISH, and all biopsies from breast and any distant metastasis)                 Complete 4/21/2021  A) LEFT MEDIAL BREAST BIOPSY:         -   BENIGN BREAST TISSUE WITH NO EVIDENCE OF MALIGNANCY ([id number])     B) LEFT LATERAL BREAST BIOPSY:         -   BENIGN BREAST TISSUE WITH NO EVIDENCE OF MALIGNANCY ([id number])    GENONOMIC TESTING     TYPE:   (Next Generation Sequencing, including Foundation One testing, and Oncotype score)     IMAGING (NEED IMAGES & REPORT)     CT SCANS     MRI     MAMMO Complete 2/17/2021 more in PACS   ULTRASOUND Complete US Breast 2/17/2021   PET     BONE SCAN     BRAIN MRI

## 2022-05-04 NOTE — PROGRESS NOTES
Rehabilitation Services        OUTPATIENT PHYSICAL THERAPY EDEMA EVALUATION  PLAN OF TREATMENT FOR OUTPATIENT REHABILITATION  (COMPLETE FOR INITIAL CLAIMS ONLY)  Patient's Last Name, First Name, Elvira Tobar                           Provider s Name:   Loli Galicia PT Medical Record No.  [id number]     Start of Care Date:  05/04/22   Onset Date:  08/01/22   Type:  PT   Medical Diagnosis:  Lymphedema, Breast CA   Therapy Diagnosis:  Left UE lymphedema, Breast CA, decreased shoulder ROM, decreased elbow ROM Visits from SOC:  1                                     __________________________________________________________________________________   Plan of Treatment/Functional Goals:    Manual lymph drainage, Gradient compression bandaging, Fit for compression garment, Exercises, Precautions to prevent infection / exacerbation, Education, Manual therapy, Skin care / precautions, Scar mobilization, Soft tissue mobilization, Myofascial release, Home management program development        GOALS  1. Goal description: patient will improve left shoulder AROM flexion to 120 degrees for functional reach for dressing       Target date: 07/29/22  2. Goal description: patient will improve left elbow felxion to >110 degrees for improved functional reach with ADLs       Target date: 07/29/22  3.            4.            5.            6.               7.             8.              Treatment Frequency: 2x/week   Treatment duration: 90 days    Loli Galicia PT                                    I CERTIFY THE NEED FOR THESE SERVICES FURNISHED UNDER        THIS PLAN OF TREATMENT AND WHILE UNDER MY CARE     (Physician co-signature of this document indicates review and certification of the therapy plan).                   Certification date from: 05/04/22       Certification date to: 07/29/22           Referring  physician: Dr. Durán   Initial Assessment  See Epic Evaluation- Start of care: 05/04/22 05/04/22 1300   Quick Adds   Quick Adds Certification   Rehab Discipline   Discipline PT   Type of Visit   Type of visit Initial Edema Evaluation   General Information   Start of care 05/04/22   Referring physician Dr. Durán   Orders Evaluate and treat as indicated   Order date 03/19/22   Medical diagnosis Lymphedema, Breast CA   Onset of illness / date of surgery 08/01/22   Edema onset 08/01/22   Affected body parts LUE   Edema etiology comments Cancer, active metastatic breast CA   Surgical / medical history reviewed Yes   Living environment comments lives in senior living facility   Fall Risk Screen   Fall screen completed by PT   Have you fallen 2 or more times in the past year? No   Have you fallen and had an injury in the past year? No   Is patient a fall risk? No   Fall screen comments patient lives alone, is aware of her balance right now   Abuse Screen (yes response referral indicated)   Feels Unsafe at Home or Work/School no   Feels Threatened by Someone no   Does Anyone Try to Keep You From Having Contact with Others or Doing Things Outside Your Home? no   Physical Signs of Abuse Present no   Patient needs abuse support services and resources No   Subjective Report   Patient report of symptoms Subjective: Patient presents to PT with left UE swelling and severely limited ROM. This started progressing over the last few months. She had history of left wrist carpal tunnel release June 2021 and has been diagnosed with metastatic breast CA with tumor in left elbow and cancer on skin on left breast and left posterior upper quadrant. She has severely limited ROM in left arm and having a hard time with reaching/doing ADLs. She lives alone in a senior living complex. Her daughter is here for the appt today. Patient doesn't drive. Her cancer was originally diagnosed 8 years ago when she had lumpectomy and  radiation. Hasn't had any issues/recurrence since then until the past few months when all of these issues started.   Precautions   Precautions comments active metastatic cancer   Cognitive Status   Cognitive status comments oriented x4   Edema Exam / Assessment   Skin condition Pitting   Skin condition comments can see red marks of cancer tissue on left posterior upper quadrant and left breast.   Pitting 4+   Pitting location left UE and dorsum of hand   Ulceration comments none   Girth Measurements   Girth Measurements Refer to separate girth measurement flowsheet   Range of Motion   ROM comments Left elbow flexion: 100 degrees, left elbow extension: lacking 45 degrees; Left shoulder flexion 35 degrees.   Strength   Strength comments not tested d/t severelly limited ROM   Posture   Posture comments rounded forward   Sensory   Sensory perception comments decreased sensation to left hand, she feels she drops things frequently when attempting to hold items.   Planned Edema Interventions   Planned edema interventions Manual lymph drainage;Gradient compression bandaging;Fit for compression garment;Exercises;Precautions to prevent infection / exacerbation;Education;Manual therapy;Skin care / precautions;Scar mobilization;Soft tissue mobilization;Myofascial release;Home management program development   Clinical Impression   Criteria for skilled therapeutic intervention met Yes   Therapy diagnosis Left UE lymphedema, Breast CA, decreased shoulder ROM, decreased elbow ROM   Influenced by the following impairments / conditions Edema;Stage 3   Functional limitations due to impairments / conditions wearing clothing, ADLs   Clinical Presentation Evolving/Changing   Clinical Presentation Rationale comorbidities   Clinical Decision Making (Complexity) Moderate complexity   Treatment Frequency 2x/week   Treatment duration 90 days   Patient / family and/or staff in agreement with plan of care Yes   Risks and benefits of therapy  have been explained Yes   Clinical impression comments Assessment: Patient presents to PT with significantly limited left UE ROM and severe swelling that has progressed the past few months. This all occurred since June 2021. She has been diagnosed with metastatic left breast CA. She has a tumor on her left elbow. She also has a known blood clot (on blood thinners) in left left subclavian Vein. She may benefit from further PT to continue with wrapping and progressing her exercises. Patient and patient's daughter agree with plan. Limiting factor to her progression is her Cancer status.   Goals   Edema Eval Goals 1;2;3   Goal 1   Goal identifier shoulder ROM   Goal description patient will improve left shoulder AROM flexion to 120 degrees for functional reach for dressing   Target date 07/29/22   Goal 2   Goal identifier elbow ROM   Goal description patient will improve left elbow felxion to >110 degrees for improved functional reach with ADLs   Target date 07/29/22   Total Evaluation Time   PT Eval, Low Complexity Minutes (41001) 30   Certification   Certification date from 05/04/22   Certification date to 07/29/22   Medical Diagnosis Lymphedema, Breast CA   Certification I certify the need for these services furnished under this plan of treatment and while under my care.  (Physician co-signature of this document indicates review and certification of the therapy plan).

## 2022-05-05 NOTE — PROGRESS NOTES
ANTICOAGULATION MANAGEMENT     Elvira Bush 82 year old female is on warfarin with supratherapeutic INR result. (Goal INR 2.0-3.0)    Recent labs: (last 7 days)     05/05/22  1147   INR 3.8*       ASSESSMENT       Source(s): Chart Review and Template       Warfarin doses taken: Warfarin taken as instructed    Diet: Change in alcohol intake may be affecting INR. had a half a glass of wine yesterday at dinner.    New illness, injury, or hospitalization: No    Medication/supplement changes: Tylenol with codeine as needed use which has potential for interaction; increasing INR    Gabapentin started on 4/11 No interaction anticipated    Signs or symptoms of bleeding or clotting: No    Previous INR: Therapeutic last 2(+) visits    Additional findings: Lymphedema treatment for breast cancer Dx: in 2014       PLAN     Recommended plan for temporary change(s) affecting INR     Dosing Instructions: hold dose then continue your current warfarin dose with next INR in 1-2 weeks       Summary  As of 5/5/2022    Full warfarin instructions:  5/5: Hold; Otherwise 2.5 mg every Tue; 5 mg all other days   Next INR check:  5/19/2022             Telephone call with Kym who verbalizes understanding and agrees to plan and who agrees to plan and repeated back plan correctly    Check at provider office visit    Education provided: Potential interaction between warfarin and alcohol, Importance of therapeutic range, Monitoring for bleeding signs and symptoms and Contact [phone] with any changes, questions or concerns.     Plan made per [facility] anticoagulation protocol    Santa Shultz RN  Anticoagulation Clinic  5/5/2022    _______________________________________________________________________     Anticoagulation Episode Summary     Current INR goal:  2.0-3.0   TTR:  66.4 % (1 y)   Target end date:  Indefinite   Send INR reminders to:  [facility]    Indications    Chronic atrial fibrillation (H) [I48.20]  Chronic deep vein  thrombosis (DVT) of axillary vein (H) [I82.A29]           Comments:           Anticoagulation Care Providers     Provider Role Specialty Phone number    Loli Alberts MD Referring Family Medicine [phone]

## 2022-05-12 NOTE — ADDENDUM NOTE
Encounter addended by: Mahi Mckinney PTA on: 5/12/2022 11:06 AM   Actions taken: Charge Capture section accepted

## 2022-05-17 PROBLEM — C50.912 RECURRENT BREAST CANCER, LEFT (H): Chronic | Status: ACTIVE | Noted: 2022-01-01

## 2022-05-17 PROBLEM — C50.912 RECURRENT BREAST CANCER, LEFT (H): Status: ACTIVE | Noted: 2022-01-01

## 2022-05-17 NOTE — LETTER
May 20, 2022      Kym Bush  [address]  [facility] [address]        Dear ,    We are writing to inform you of your test results.    Your electrolytes, kidney function and glucose results are all normal.    Resulted Orders   Basic metabolic panel   Result Value Ref Range    Sodium 142 136 - 145 mmol/L    Potassium 3.8 3.5 - 5.0 mmol/L    Chloride 107 98 - 107 mmol/L    Carbon Dioxide (CO2) 25 22 - 31 mmol/L    Anion Gap 10 5 - 18 mmol/L    Urea Nitrogen 21 8 - 28 mg/dL    Creatinine 0.81 0.60 - 1.10 mg/dL    Calcium 9.6 8.5 - 10.5 mg/dL    Glucose 94 70 - 125 mg/dL    GFR Estimate 71 >60 mL/min/1.73m2      Comment:      Effective December 21, 2021 eGFRcr in adults is calculated using the 2021 CKD-EPI creatinine equation which includes age and gender (Jamir et al., NEJM, DOI: 10.1056/XAGPxz3554647)       If you have any questions or concerns, please call the clinic at the number listed above.       Sincerely,      Loli Alberts MD

## 2022-05-17 NOTE — PATIENT INSTRUCTIONS
Patient Education   Personalized Prevention Plan  You are due for the preventive services outlined below.  Your care team is available to assist you in scheduling these services.  If you have already completed any of these items, please share that information with your care team to update in your medical record.  Health Maintenance Due   Topic Date Due     ANNUAL REVIEW OF HM ORDERS  Never done     Urine Test  Never done     FALL RISK ASSESSMENT  07/14/2021     COVID-19 Vaccine (4 - Booster for Moderna series) 03/01/2022       Signs of Hearing Loss      Hearing much better with one ear can be a sign of hearing loss.   Hearing loss is a problem shared by many people. In fact, it is one of the most common health problems, particularly as people age. Most people age 65 and older have some hearing loss. By age 80, almost everyone does. Hearing loss often occurs slowly over the years. So you may not realize your hearing has gotten worse.  Have your hearing checked  Call your healthcare provider if you:    Have to strain to hear normal conversation    Have to watch other people s faces very carefully to follow what they re saying    Need to ask people to repeat what they ve said    Often misunderstand what people are saying    Turn the volume of the television or radio up so high that others complain    Feel that people are mumbling when they re talking to you    Find that the effort to hear leaves you feeling tired and irritated    Notice, when using the phone, that you hear better with one ear than the other  [employer] last reviewed this educational content on 1/1/2020 2000-2021 [employer]. All rights reserved. This information is not intended as a substitute for professional medical care. Always follow your healthcare professional's instructions.

## 2022-05-17 NOTE — PROGRESS NOTES
"SUBJECTIVE:   Elvira Bush is a 82 year old female who presents for Preventive Visit.  She is joined by her daughter.  Unfortunately this patient has had a reoccurrence of her breast cancer.  She has had severe lymphedema in her left upper extremity from her cancer/chronic DVT/history of radiation.  This has improved since physical therapy which has been started in May.  She has elected to stop oncology visits but wants to continue with her physical therapy.  She uses warfarin for history of atrial fibrillation.  She has hypertension, hyperlipidemia history of seizure disorder and mild chronic kidney disease.  She complains of decreased hearing and plugged ears.  She would like to have them washed out if needed.  Her last mammogram was in February 2021 and was \"suspicious\".  Her last DEXA scan was 11/7/2019.  She will no longer be doing preventative screenings.  She is willing to do her fourth COVID-vaccine today.      Are you in the first 12 months of your Medicare coverage?  No    Healthy Habits:     In general, how would you rate your overall health?  Good    Frequency of exercise:  2-3 days/week    Duration of exercise:  N/A    Do you usually eat at least 4 servings of fruit and vegetables a day, include whole grains    & fiber and avoid regularly eating high fat or \"junk\" foods?  Yes    Taking medications regularly:  Yes    Medication side effects:  None    Ability to successfully perform activities of daily living:  No assistance needed    Home Safety:  No safety concerns identified    Hearing Impairment:  Difficulty understanding soft or whispered speech    In the past 6 months, have you been bothered by leaking of urine?  No    In general, how would you rate your overall mental or emotional health?  Good      PHQ-2 Total Score: 0    Additional concerns today:  Yes    Do you feel safe in your environment? Yes    Have you ever done Advance Care Planning? (For example, a Health Directive, POLST, or a " discussion with a medical provider or your loved ones about your wishes): Yes, advance care planning is on file.       Fall risk  Fallen 2 or more times in the past year?: No  Any fall with injury in the past year?: No    Cognitive Screening   1) Repeat 3 items (Leader, Season, Table)    2) Clock draw: ABNORMAL missed time   3) 3 item recall: Recalls 3 objects  Results: ABNORMAL clock, 1-2 items recalled: PROBABLE COGNITIVE IMPAIRMENT, **INFORM PROVIDER**    Mini-CogTM Copyright SHIRA Burton. Licensed by the author for use in [facility]; reprinted with permission (talha@[facility]). All rights reserved.      Do you have sleep apnea, excessive snoring or daytime drowsiness?: no    Reviewed and updated as needed this visit by clinical staff   Tobacco  Allergies  Meds                Reviewed and updated as needed this visit by Provider                   Social History     Tobacco Use     Smoking status: Never Smoker     Smokeless tobacco: Never Used   Substance Use Topics     Alcohol use: Not Currently     Alcohol/week: 2.0 standard drinks     Comment: Alcoholic Drinks/day: no alcohol this summer     If you drink alcohol do you typically have >3 drinks per day or >7 drinks per week? No    Alcohol Use 5/17/2022   Prescreen: >3 drinks/day or >7 drinks/week? No   Prescreen: >3 drinks/day or >7 drinks/week? -           Current providers sharing in care for this patient include:   Patient Care Team:  Loli Alberts MD as PCP - Shekhar Escobedo MD, MD as MD (Hematology & Oncology)  Loli Alberts MD as Assigned PCP  Jet Montejo MD as Assigned Heart and Vascular Provider  Loli Escalona MD as Assigned Cancer Care Provider    The following health maintenance items are reviewed in Epic and correct as of today:  Health Maintenance Due   Topic Date Due     ANNUAL REVIEW OF HM ORDERS  Never done     URINALYSIS  Never done     FALL RISK ASSESSMENT  07/14/2021     Labs  "reviewed in EPIC  BP Readings from Last 3 Encounters:   05/17/22 138/88   04/11/22 (!) 151/84   03/15/22 (!) 144/90    Wt Readings from Last 3 Encounters:   05/17/22 50.5 kg (111 lb 6.4 oz)   04/11/22 50.7 kg (111 lb 12.8 oz)   03/15/22 50.3 kg (111 lb)                  Mammogram Screening - Patient over age 75, has elected to discontinue screenings.  Pertinent mammograms are reviewed under the imaging tab.    Review of Systems  CONSTITUTIONAL:POSITIVE  for anorexia and fatigue  INTEGUMENTARY/SKIN: NEGATIVE for worrisome rashes, moles or lesions  EYES: NEGATIVE for vision changes or irritation  ENT/MOUTH: NEGATIVE for ear, mouth and throat problems  RESP: NEGATIVE for significant cough or SOB  BREAST: POSITIVE for lump with current breast cancer  CV: NEGATIVE for chest pain, palpitations or peripheral edema  GI: NEGATIVE for nausea, abdominal pain, heartburn, or change in bowel habits  : NEGATIVE for frequency, dysuria, or hematuria  MUSCULOSKELETAL: NEGATIVE for significant arthralgias or myalgia  NEURO: NEGATIVE for weakness, dizziness or paresthesias  ENDOCRINE: NEGATIVE for temperature intolerance, skin/hair changes  HEME: NEGATIVE for bleeding problems  PSYCHIATRIC: NEGATIVE for changes in mood or affect    OBJECTIVE:   /88 (BP Location: Right arm, Patient Position: Sitting, Cuff Size: Adult Regular)   Pulse 51   Temp 97.6  F (36.4  C)   Ht 1.575 m (5' 2\")   Wt 50.5 kg (111 lb 6.4 oz)   SpO2 98%   BMI 20.38 kg/m   Estimated body mass index is 20.38 kg/m  as calculated from the following:    Height as of this encounter: 1.575 m (5' 2\").    Weight as of this encounter: 50.5 kg (111 lb 6.4 oz).  Physical Exam  General appearance - alert, well appearing, and in no distress and normal appearing weight  Mental status - normal mood, behavior, speech, dress, motor activity, and thought processes  Eyes - pupils equal and reactive, extraocular eye movements intact  Ears - bilateral TM's and external ear " canals normal, ceruminosis noted  Nose - normal and patent, no erythema, discharge   Mouth - not examined and Covered by mask  Neck - adenopathy noted left cervical and axillary, carotids upstroke normal bilaterally, no bruits, thyroid exam: thyroid is normal in size without nodules or tenderness  Lymphatics - no hepatosplenomegaly, enlarged lymph nodes palpated   Chest - rales noted , breathing comfortably  Heart - irregularly irregular rhythm with rate of 51  Abdomen - soft, nontender, nondistended, no masses or organomegaly  Breasts - abnormal mass palpable left breast and chest wall  Pelvic - examination not indicated  Back exam - full range of motion, no tenderness, palpable spasm or pain on motion  Neurological - alert, oriented, normal speech, no focal findings or movement disorder noted, cranial nerves II through XII intact, DTR's normal and symmetric  Musculoskeletal - no joint tenderness, deformity or swelling  Extremities - peripheral pulses normal, no pedal edema, no clubbing or cyanosis  Skin - normal coloration and turgor, no rashes, no suspicious skin lesions noted      Diagnostic Test Results:  Results for orders placed or performed in visit on 05/17/22   INR     Status: Abnormal   Result Value Ref Range    INR 3.14 (H) 0.85 - 1.15   Basic metabolic panel     Status: Normal   Result Value Ref Range    Sodium 142 136 - 145 mmol/L    Potassium 3.8 3.5 - 5.0 mmol/L    Chloride 107 98 - 107 mmol/L    Carbon Dioxide (CO2) 25 22 - 31 mmol/L    Anion Gap 10 5 - 18 mmol/L    Urea Nitrogen 21 8 - 28 mg/dL    Creatinine 0.81 0.60 - 1.10 mg/dL    Calcium 9.6 8.5 - 10.5 mg/dL    Glucose 94 70 - 125 mg/dL    GFR Estimate 71 >60 mL/min/1.73m2       ASSESSMENT / PLAN:     Encounter for Medicare annual wellness exam  Willing to do her fourth COVID vaccine today.  Otherwise up-to-date on her immunizations.  No longer doing preventative screenings.    Recurrent breast cancer, left (H)  Active cancer for which they  have elected no longer treatment.  She is currently feeling pretty good except for she does not have a very good appetite and we discussed perhaps trying something like Ensure juice.  No longer following with oncology.  Discussed future palliative/hospice care which she is not currently ready for.  The plan is to live with her daughter who lives near [place].    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  I will refill her gabapentin as she is no longer seeing Dr. Escalona.  - gabapentin (NEURONTIN) 300 MG capsule  Dispense: 180 capsule; Refill: 1    Lymphedema of left upper extremity  Currently receiving physical therapy which is helpful as this has been very bothersome to her.  She is now able to use her left hand.  She is using a sleeve.  - Basic metabolic panel  - Basic metabolic panel    Chronic deep vein thrombosis (DVT) of axillary vein of left upper extremity (H)  Despite being anticoagulated she has chronic DVT in this left arm.    Chronic atrial fibrillation (H)  On warfarin and last INR on 5/5 was supratherapeutic at 3.8.  Will monitor labs and warfarin nurses will get back to her on the directions for dosing.  - INR  - INR    Essential hypertension  On hydrochlorothiazide and atenolol.  Adequately enough controlled.    Essential Hypercholesterolemia  Using Lipitor 20 with last LDL of 79 in November 2021.    Chronic kidney disease, stage 3a (H)  Very mild.  Will monitor.  - Basic metabolic panel  - Basic metabolic panel    Localization-related focal epilepsy with simple partial seizures (H)  Saw the nurse practitioner at the her neurology clinic on 3/15.  On Keppra.  No recent (years) seizures.    Immunization due  Willing to do her fourth vaccine today.  - COVID-19,PF,PFIZER (12+ Yrs GRAY LABEL)    Bilateral impacted cerumen  Patient complained of clogged ears and decreased hearing.  Wish to have her ears cleaned out which nursing  "did.  However she continued to have debris in her left ear canal.  I subsequently used an alligator forceps and removed the rest of the debris for the patient.  - REMOVE IMPACTED CERUMEN    She should follow-up with me in 3 months time as she is no longer following with oncology and is trying to minimize her specialist visits.    Patient has been advised of split billing requirements and indicates understanding: Yes    COUNSELING:  Reviewed preventive health counseling, as reflected in patient instructions    Estimated body mass index is 20.38 kg/m  as calculated from the following:    Height as of this encounter: 1.575 m (5' 2\").    Weight as of this encounter: 50.5 kg (111 lb 6.4 oz).    Weight management plan: Discussed healthy diet and exercise guidelines    She reports that she has never smoked. She has never used smokeless tobacco.      Appropriate preventive services were discussed with this patient, including applicable screening as appropriate for cardiovascular disease, diabetes, osteopenia/osteoporosis, and glaucoma.  As appropriate for age/gender, discussed screening for colorectal cancer, prostate cancer, breast cancer, and cervical cancer. Checklist reviewing preventive services available has been given to the patient.    Reviewed patients plan of care and provided an AVS. The Complex Care Plan (for patients with higher acuity and needing more deliberate coordination of services) for Elvira meets the Care Plan requirement. This Care Plan has been established and reviewed with the Patient and daughter.    Counseling Resources:  ATP IV Guidelines  Pooled Cohorts Equation Calculator  Breast Cancer Risk Calculator  Breast Cancer: Medication to Reduce Risk  FRAX Risk Assessment  ICSI Preventive Guidelines  Dietary Guidelines for Americans, 2010  [employer] My[employer]ate  ASA Prophylaxis  Lung CA Screening    Loli Alberts MD  [facility]    Identified Health Risks:  "

## 2022-05-18 NOTE — PROGRESS NOTES
ANTICOAGULATION MANAGEMENT     Elvira Bush 82 year old female is on warfarin with supratherapeutic INR result. (Goal INR 2.0-3.0)    Recent labs: (last 7 days)     05/17/22  1318   INR 3.14*       ASSESSMENT       Source(s): Chart Review and Template       Warfarin doses taken: Warfarin taken as instructed    Diet: No new diet changes identified    New illness, injury, or hospitalization: No    Medication/supplement changes: None noted    Signs or symptoms of bleeding or clotting: No    Previous INR: Supratherapeutic    Additional findings: Per patient, she fell asleep and forgot to take her dose last night.       PLAN     Recommended plan for no diet, medication or health factor changes affecting INR     Dosing Instructions: decrease your warfarin dose (7.7% change) with next INR in 1-2 weeks       Summary  As of 5/18/2022    Full warfarin instructions:  2.5 mg every Tue, Fri; 5 mg all other days   Next INR check:  5/31/2022             Telephone call with Pat who verbalizes understanding and agrees to plan and who agrees to plan and repeated back plan correctly    Lab visit scheduled    Education provided: Importance of therapeutic range    Plan made per [facility] anticoagulation protocol    Santa Shultz RN  Anticoagulation Clinic  5/18/2022    _______________________________________________________________________     Anticoagulation Episode Summary     Current INR goal:  2.0-3.0   TTR:  63.0 % (1 y)   Target end date:  Indefinite   Send INR reminders to:  [facility]    Indications    Chronic atrial fibrillation (H) [I48.20]  Chronic deep vein thrombosis (DVT) of axillary vein (H) [I82.A29]           Comments:           Anticoagulation Care Providers     Provider Role Specialty Phone number    Loli Alberts MD Referring Family Medicine [phone]

## 2022-05-24 NOTE — TELEPHONE ENCOUNTER
Reason for Call:  Other increase gabapentin    Detailed comments: pt states PT has recommended a possible increase in gabapentin. States she takes in am and at night, but they are suggesting maybe a dose in the day too?  Please advise.    Phone Number Patient can be reached at: Home number on file [phone] (home)    Best Time: anytime    Can we leave a detailed message on this number? YES    Call taken on 5/24/2022 at 10:03 AM by Triston Carter

## 2022-05-24 NOTE — TELEPHONE ENCOUNTER
Sent new Rx for 3 times daily.  Possibility to move up even further if needed.  Please inform the patient.

## 2022-05-26 NOTE — PROGRESS NOTES
[facility]    OUTPATIENT PHYSICAL THERAPY  PLAN OF TREATMENT FOR OUTPATIENT REHABILITATION AND PROGRESS NOTE           Patient's Last Name, First Name, Elvira Tobar Date of Birth  1940   Provider's Name  [facility] Medical Record No.  [id number]    Onset Date  8/1/2021 Start of Care Date  5/4/2022   Type:     _X_PT   ___OT   ___SLP Medical Diagnosis  Lymphedema, Breast CA   PT Diagnosis  Left UE lymphedema, hyperplasia, firbosis, Metastatic Breast CA, decreased shoulder ROM, decreased elbow ROM Plan of Treatment  Frequency/Duration: 2 times per week for up to 20 visits total if needed.  Certification date from 5/26/2022  to 8/20/2022          Goals: See below    Beginning/End Dates of Progress Note Reporting Period:  5/4/2022 to 5/26/2022     Client Self (Subjective) Report for Progress Note Reporting Period: see below         I CERTIFY THE NEED FOR THESE SERVICES FURNISHED UNDER        THIS PLAN OF TREATMENT AND WHILE UNDER MY CARE     (Physician co-signature of this document indicates review and certification of the therapy plan).                Referring Provider: Dr. Pola Galicia, PT           05/26/22 1300   Signing Clinician's Name / Credentials   Signing clinician's name / credentials Loli Galicia, PT   Session Number   Session Number 7/12 per med cert   Progress Note/Recertification   Recertification Due Date 07/29/22   Adult Goals   Edema Eval Goals 1;2;3   Goal 1   Goal identifier shoulder ROM   Goal description patient will improve left shoulder AROM flexion to 120 degrees for functional reach for dressing   Goal Progress , was 35 degrees on eval, is 30 degrees today   Target date 07/29/22   Goal 2   Goal identifier elbow ROM   Goal description patient will improve left elbow flexion to >110 degrees for improved functional  reach with ADLs   Goal Progress MET on 5/19/2022, when swelling is reduced right out of compression wraps   Target date 07/29/22   Goal 3   Goal identifier swelling   Goal description Patient will reduce swelling by 10% of original volume for improved function   Goal Progress MET when measuring right out of wraps, but not when measuring after wearing compression garment   Target date 07/29/22   Subjective Report   Subjective Report Subjective: Patient comes today with complaints of left wrist pain since wrapping 4 days ago. She is still very limited with her left shoulder ROM and left elbow ROM. She is having a hard time getting dressed because of her swelling. She has a hard time with showering and self cares d/t swelling.   Objective Measures   Objective Measures Objective Measure 1;Objective Measure 2   Objective Measure 1   Objective Measure see edema volume flowsheet   Objective Measure 2   Objective Measure Left elbow ROM: lacking 40 degrees of extension, 115 degrees flexion   Details Left shoulder ROM: 20 degrees   Treatment Interventions   Interventions Manual Therapy;Therapeutic Procedure/Exercise;Self Care/Home Management   Therapeutic Procedure/exercise   Progress HEP and performed in clinic: (from PTRx): AAROM elbow flexion, AAROM shoulder flexion (using dowel), wrist extension/flexion AAROM and AROM, making a fist, added squeezing foam blocks to HEP (red and blue blocks given). To perform 10 times 1-2 times per day.   Manual Therapy   Manual Therapy: Mobilization, MFR, MLD, friction massage minutes (92072) 40   Skilled Intervention Light MLD just to left UE to soften the swelling and to patient's comfort level. Medical compression bandages: otho stockinette hand to upper arm. Rosidal foam hand to upper arm 2 rolls of comprilan short stretch banages.   Patient Response tolerates   Self Care/home Management   ADL/Home Mgmt Training (99496) 20   Skilled Intervention pulleys - trial in clinic and ed that  it's an option to get at home. reviewed gauntlet vs. glove, ed on night garments. answered further questions on compression pump.   Equipment Needs   Equipment Needs PT pursuing compression pump.   Education   Learner Patient   Communication with other professionals   Communication with other professionals Compression pump being pursued. Had compression pump rep during PT visit on 5/26/2022   Plan   Plan for next session assess wrapping, continue with left UE clearing, remeasure left UE volume, rewrap, continue with exercises.   Comments   Comments Assessment: Patient has been seen in PT for 7 visits. She has been able to tolerate the continuous gradient compression wrapping since evaluation. Unfortunately, her swelling continues to increase quickly upon removal of wraps (when she removes wraps to shower at home and come right into clinic, the volume increases again). She has had a reduce in left UE volume since evaluation. Today, we measured immediately after removing her compression wraps (that were donned by PT at last session). Per these measurments, she has reduced about 800mL since the evaluation measurements. The difficulty with her situation is that she is unable to self wrap d/t her severely limited left shoulder and elbow ROM and she lives alone. It is hard enough for her to don a compression garments. When she has worn a compression garment (after removing wraps to shower and come back into clinic), her swelling worsens. 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 Patient has done PT for 3 weeks with constant short-stretch compression wraps intact. She will have wraps for 1 more week, then we will take a 4 week break to assess self management. Next session, will measure volume again. Patient and patient's daughter are going to be purchasing a gauntlet for her hand swelling and possibly looking into a night garment.  Patient already has an arm sleeve that is 20-30mmHg that she wears when she has to remove the wraps to shower. Her neighbor has to don the arm sleeve for her, and unfortunately, the neighbor is moving in 2 weeks. Today, patient had a compression pump rep out to the clinic for a compression pump fitting. PT hopes that getting a compression pump will help with her left UE swelling management.   Total Session Time   Timed Code Treatment Minutes 60   Total Treatment Time (sum of timed and untimed services) 60   AMBULATORY CLINICS ONLY-MEDICAL AND TREATMENT DIAGNOSIS   Medical diagnosis Lymphedema, Breast CA   Therapy diagnosis Left UE lymphedema, hyperplasia, firbosis, Metastatic Breast CA, decreased shoulder ROM, decreased elbow ROM

## 2022-06-06 NOTE — PROGRESS NOTES
ANTICOAGULATION MANAGEMENT     Elviar Bush 82 year old female is on warfarin with therapeutic INR result. (Goal INR 2.0-3.0)    Recent labs: (last 7 days)     06/01/22  0817   INR 2.7*       ASSESSMENT       Source(s): Chart Review, Patient/Caregiver Call and Template       Warfarin doses taken: Warfarin taken as instructed    Diet: No new diet changes identified    New illness, injury, or hospitalization: No    Medication/supplement changes: None noted    Signs or symptoms of bleeding or clotting: No    Previous INR: Supratherapeutic    Additional findings: INR result was not released/routed on date it was done. Patient called today to follow up, stated that he has been calling but no encounter noted.       PLAN     Recommended plan for no diet, medication or health factor changes affecting INR     Dosing Instructions: continue your current warfarin dose with next INR in 2 weeks       Summary  As of 6/6/2022    Full warfarin instructions:  2.5 mg every Tue, Fri; 5 mg all other days   Next INR check:  6/15/2022             Telephone call with Pat who verbalizes understanding and agrees to plan and who agrees to plan and repeated back plan correctly    Lab visit scheduled    Education provided: Importance of therapeutic range    Plan made per [facility] anticoagulation protocol    Santa Shultz, RN  Anticoagulation Clinic  6/6/2022    _______________________________________________________________________     Anticoagulation Episode Summary     Current INR goal:  2.0-3.0   TTR:  61.2 % (1 y)   Target end date:  Indefinite   Send INR reminders to:  [facility]    Indications    Chronic atrial fibrillation (H) [I48.20]  Chronic deep vein thrombosis (DVT) of axillary vein (H) [I82.A29]           Comments:           Anticoagulation Care Providers     Provider Role Specialty Phone number    Loli Alberts MD Referring Family Medicine [phone]

## 2022-06-06 NOTE — TELEPHONE ENCOUNTER
ACN called [facility] to follow up due to result was not released/routed to [facility] on date it was done. Noted that the anticoagulation template was scanned on patient's medical records.

## 2022-06-06 NOTE — TELEPHONE ENCOUNTER
Reason for Call:  INR    Who is calling?  Patient    Phone number:  [phone]    Fax number:      Name of caller: Kym    INR Value:  2.7    Are there any other concerns:  No- Patient had her INR done on 6/01/22 and no one has called her for dosing or help her to schedule her for her next appointment.  Patient is very concerned    Can we leave a detailed message on this number? YES    Phone number patient can be reached at: Home number on file [phone] (home)      Call taken on 6/6/2022 at 2:06 PM by Na Alcantara

## 2022-06-14 NOTE — PROGRESS NOTES
[facility]    OUTPATIENT PHYSICAL THERAPY  PLAN OF TREATMENT FOR OUTPATIENT REHABILITATION AND PROGRESS NOTE             Patient's Last Name, First Name, Elvira Tobar Date of Birth  1940   Provider's Name  [facility] Medical Record No.  [id number]    Onset Date  8/1/2021 Start of Care Date  5/4/2022   Type:     _X_PT   ___OT   ___SLP Medical Diagnosis  Lymphedema secondary to recurrent Breast CA   PT Diagnosis  Left UE lymphedema, Breast CA, decreased shoulder ROM, decreased elbow ROM Plan of Treatment  Frequency/Duration: 1-2 times per week for up to 18 visits total  Certification date from 6/14/2022 to 9/11/2022     Goals:  Goal Identifier shoulder ROM   Goal Description patient will improve left shoulder AROM flexion to 120 degrees for functional reach for dressing   Target Date 09/11/22   Date Met      Progress (detail required for progress note): , was 35 degrees on eval, is 15 degrees today     Goal Identifier elbow ROM   Goal Description patient will improve left elbow flexion to >110 degrees for improved functional reach with ADLs   Target Date 09/11/22   Date Met      Progress (detail required for progress note): Was MET on 5/19/2022, when swelling is reduced right out of compression wraps, but today, elbow flexion is 87 degrees     Goal Identifier swelling   Goal Description Patient will reduce swelling by 10% of original volume for improved function   Target Date 09/11/22   Date Met      Progress (detail required for progress note): was MET when measuring right out of wraps, but not today, despite having worn compression sleeve consistently the past two weeks.     Goal Identifier     Goal Description     Target Date     Date Met      Progress (detail required for progress note):       Goal Identifier     Goal Description     Target Date      Date Met      Progress (detail required for progress note):       Goal Identifier     Goal Description     Target Date     Date Met      Progress (detail required for progress note):       Goal Identifier     Goal Description     Target Date     Date Met      Progress (detail required for progress note):       Goal Identifier     Goal Description     Target Date     Date Met      Progress (detail required for progress note):         Beginning/End Dates of Progress Note Reporting Period:  5/4/2022 to 6/14/2022    Client Self (Subjective) Report for Progress Note Reporting Period: Subjective: Patient has had compression sleeve consistently since the last session. She has to ask random neighbors in her building to don it for her d/t not being able to self don her sleeve (d/t lack of left shoulder and elbow ROM, left hand weakness and right shoulder arthritis). She is feeling like her left arm is more swollen and firm today than the last time she was here. She denies significant pain, but still has the tingling in her left fingers. She has not had flexitouch coverage confirmed yet.    Objective Measurements:   Objective Measure: see edema volume flowsheet (measured coming out of her left arm compression sleeve)     Objective Measure: Left elbow ROM: lacking 50 degrees of extension, 87 degrees flexion  Details: Left shoulder ROM: 15 degrees abduction and flexion                                    I CERTIFY THE NEED FOR THESE SERVICES FURNISHED UNDER        THIS PLAN OF TREATMENT AND WHILE UNDER MY CARE     (Physician co-signature of this document indicates review and certification of the therapy plan).                Referring Provider: Dr. Aristeo Galicia, PT          Assessment: Patient comes in today sooner than we previously had decided. We were going to take one month off for her to do self management, but she was feeling her left arm swelling up more and we needed updated left arm circumference  measurements. Patient has been seen in PT for 9 visits. She has been able to tolerate the continuous gradient compression wrapping since evaluation. Unfortunately, her swelling continues to increase quickly upon removal of wraps (when she removes wraps to shower at home and come right into clinic, the volume increases again). She has had a reduction in left UE volume since evaluation (was 2793mL on evaluation, now is 2427mL as of today) however, her swelling has been down to 1902 mL when she comes right out of her wraps. The volume from just 2 weeks ago was 2300mL. So, despite two weeks of self managing with exercise (as much as what the left arm can do with her severely limited ROM (see objective measures), self MLD, elevation (within her limited ROM) and compliance with wearing compression sleeve, her left UE volume is already starting to increase, especially around her elbow limiting her ROM even further than the previous session. She also continues to have severe fibrosis/hyperplasia on left chest, left posterior upper quadrant and left shoulder d/t her active cancer status and moderate hyperplasia through entire left arm. Unfortunately, she is not able to wrap herself. She also needs assistance to don her compression garments. She has been asking random neighbors in her apartment building to don her compression sleeve. The measurements taken today were right after removing her compression sleeve that she has been compliant wearing and unfortunately, her volume is increased over the past 2 weeks. Patient has done PT for > 4 weeks now with constant short-stretch compression wraps intact or 20-30 mmHg compression sleeve. PT hopes that getting a compression pump will help with her left UE swelling management in addition to her compression sleeve and exercises to the best of her ability. She also may benefit from getting a flexitouch d/t her severe trunk fibrosis, the basic pump will not address this. With the  compression pump fitting, she noticed it improved her swelling and states she will be able to do it herself. We are going to try a full month off from PT to see how self managing goes, patient wanted to try this (take a break from appointments), but if she is noticing significant worsening, we discussed trying to have her come in sooner. Patient will return to clinic in 1 month after self managing her left UE swelling.

## 2022-06-15 NOTE — PROGRESS NOTES
ANTICOAGULATION MANAGEMENT     Elvira Bush 82 year old female is on warfarin with therapeutic INR result. (Goal INR 2.0-3.0)    Recent labs: (last 7 days)     06/15/22  1238   INR 2.6*       ASSESSMENT       Source(s): Chart Review, Patient/Caregiver Call and Template       Warfarin doses taken: More warfarin taken than planned which may be affecting INR     Diet: No new diet changes identified    New illness, injury, or hospitalization: No    Medication/supplement changes: None noted    Signs or symptoms of bleeding or clotting: No    Previous INR: Therapeutic last visit; previously outside of goal range    Additional findings: Pt reports she has been taking 1/2 tab on Tuesdays only for quite a while. Since INR therapeutic, will continue dosing as she has been taking.        PLAN     Recommended plan for temporary change(s) affecting INR     Dosing Instructions: continue your current warfarin dose as you have been taking it with next INR in 3 weeks       Summary  As of 6/15/2022    Full warfarin instructions:  2.5 mg every Tue; 5 mg all other days   Next INR check:  7/6/2022             Telephone call with Kym who verbalizes understanding and agrees to plan    Lab visit scheduled    Education provided: Goal range and significance of current result and Importance of following up at instructed interval    Plan made per [facility] anticoagulation protocol    Leonie Yee RN  Anticoagulation Clinic  6/15/2022    _______________________________________________________________________     Anticoagulation Episode Summary     Current INR goal:  2.0-3.0   TTR:  61.8 % (1 y)   Target end date:  Indefinite   Send INR reminders to:  [facility]    Indications    Chronic atrial fibrillation (H) [I48.20]  Chronic deep vein thrombosis (DVT) of axillary vein (H) [I82.A29]           Comments:           Anticoagulation Care Providers     Provider Role Specialty Phone number    Loli Alberts MD Referring Family  Medicine [phone]

## 2022-06-29 NOTE — PROGRESS NOTES
"[facility]: Cancer Care Short Note                                    Discussion with Patient:                                                      Pat received a letter to return to clinic for annual follow up.  She has elected to go to PCP for her future follow up.    Per [facility] note on 8/2/21, \"Being 7+ years out from her breast cancer diagnosis I offered follow up through her PCP and to contact us if questions/concerns arose - patient said the less appointments the better and will follow with her PCP from hereon, contacting us with any questions or concerns.\"    Writer shared that scheduling wll be updated with her future follow up plan and will have her removed from the recall list           Assessment:                                                        No assessment indicated    Intervention/Education provided during outreach:                                                         Scheduling team was updated on plan for patient to return to PCP for future follow up and asked that she be removed from the recall list/Isis Painter RN  "

## 2022-07-06 NOTE — PROGRESS NOTES
ANTICOAGULATION MANAGEMENT     Elvira Bush 82 year old female is on warfarin with supratherapeutic INR result. (Goal INR 2.0-3.0)    Recent labs: (last 7 days)     07/06/22  1116   INR 3.1*       ASSESSMENT       Source(s): Chart Review, Patient/Caregiver Call and Template       Warfarin doses taken: Warfarin taken as instructed    Diet: Decreased greens/vitamin K in diet; plans to resume previous intake    New illness, injury, or hospitalization: No    Medication/supplement changes: None noted    Signs or symptoms of bleeding or clotting: No    Previous INR: Therapeutic last 2(+) visits    Additional findings: None       PLAN     Recommended plan for temporary change(s) affecting INR     Dosing Instructions: continue your current warfarin dose with next INR in 2-3 weeks       Summary  As of 7/6/2022    Full warfarin instructions:  2.5 mg every Tue; 5 mg all other days   Next INR check:  7/27/2022             Telephone call with Pat who verbalizes understanding and agrees to plan    Lab visit scheduled    Education provided: Importance of consistent vitamin K intake and Impact of vitamin K foods on INR    Plan made per [facility] anticoagulation protocol    Santa Shultz RN  Anticoagulation Clinic  7/6/2022    _______________________________________________________________________     Anticoagulation Episode Summary     Current INR goal:  2.0-3.0   TTR:  64.6 % (1 y)   Target end date:  Indefinite   Send INR reminders to:  [facility]    Indications    Chronic atrial fibrillation (H) [I48.20]  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

## 2022-07-06 NOTE — TELEPHONE ENCOUNTER
Pt called stating that she was given a rx for acetaminophen codeine 3 and uses it on occasion for pain. She is currently out and wondering if Dr. Alberts would send in a refill.

## 2022-07-19 NOTE — TELEPHONE ENCOUNTER
----- Message from Loli Galicia PT sent at 7/18/2022  8:00 AM CDT -----  Hi Dr. Alberts -  One more thing on Pat. Could you place an OT order in the system for her please? I am hoping she can get some help with compensatory strategies for her left hand usage during daily tasks.   Thanks!!  -Jessie

## 2022-08-09 NOTE — TELEPHONE ENCOUNTER
Received orders for Dr. Loli Escalona. Faxed order to appropriate clinic. Faxed was destroyed. Also, LM w/ [employer] re situation.    Giselle Velasquez MA on 8/9/2022 at 5:02 PM

## 2022-08-09 NOTE — TELEPHONE ENCOUNTER
The form is supposed to be signed by Dr. Loli Escalona who is the radiation oncologist of this patient.

## 2022-08-09 NOTE — PROGRESS NOTES
ANTICOAGULATION MANAGEMENT     Elvira Bush 82 year old female is on warfarin with supratherapeutic INR result. (Goal INR 2.0-3.0)    Recent labs: (last 7 days)     08/09/22  1200   INR 4.0*       ASSESSMENT       Source(s): Chart Review and Template       Warfarin doses taken: Less warfarin taken than planned which may be affecting INR    Diet: No new diet changes identified    New illness, injury, or hospitalization: No    Medication/supplement changes: None noted    Signs or symptoms of bleeding or clotting: No    Previous INR: Supratherapeutic    Additional findings: Patient is still getting lymphedema treatments       PLAN     Recommended plan for no diet, medication or health factor changes affecting INR     Dosing Instructions: hold dose then decrease your warfarin dose (16.7% change) from previous but only 8.3 change from total taken by patient ( with next INR in 7-10 days       Summary  As of 8/9/2022    Full warfarin instructions:  8/9: Hold; Otherwise 5 mg every Mon, Wed, Fri; 2.5 mg all other days   Next INR check:  8/19/2022             Telephone call with Kym and josh Monzon who verbalizes understanding and agrees to plan and who agrees to plan and repeated back plan correctly    Lab visit scheduled    Education provided: Goal range and significance of current result, Importance of therapeutic range, Importance of following up at instructed interval, Monitoring for bleeding signs and symptoms, When to seek medical attention/emergency care and Contact [phone] with any changes, questions or concerns.     Plan made per [facility] anticoagulation protocol    Santa Shultz RN  Anticoagulation Clinic  8/9/2022    _______________________________________________________________________     Anticoagulation Episode Summary     Current INR goal:  2.0-3.0   TTR:  58.5 % (1 y)   Target end date:  Indefinite   Send INR reminders to:  [facility]    Indications    Chronic atrial fibrillation (H)  [I48.20]  Chronic deep vein thrombosis (DVT) of axillary vein (H) [I82.A29]           Comments:           Anticoagulation Care Providers     Provider Role Specialty Phone number    Loli Alberts MD Referring Family Medicine [phone]

## 2022-08-09 NOTE — TELEPHONE ENCOUNTER
Forms Request    Name of form/paperwork: [employer]   Have you been seen for this request:   Do we have the form: placed in provider mailbox  When is form needed by: when complete  How would you like the form returned: fax [phone]  Patient Notified form requests are processed in 3-5 business days: na    Okay to leave a detailed message? na

## 2022-08-18 NOTE — PROGRESS NOTES
ANTICOAGULATION MANAGEMENT     Elvira Bush 82 year old female is on warfarin with subtherapeutic INR result. (Goal INR 2.0-3.0)    Recent labs: (last 7 days)     08/18/22  1413   INR 1.7*       ASSESSMENT       Source(s): Chart Review, Patient/Caregiver Call and Template       Warfarin doses taken: Template incorrect; verbally confirmed home dose with Pat . She wasn't sure if she was supposed to hold dose again this Tuesday so she did.     Diet: No new diet changes identified    New illness, injury, or hospitalization: No    Medication/supplement changes: None noted    Signs or symptoms of bleeding or clotting: No    Previous INR: Supratherapeutic    Additional findings: None       PLAN     Recommended plan for temporary change(s) affecting INR     Dosing Instructions: booster dose then continue your current warfarin dose with next INR in 2 weeks       Summary  As of 8/18/2022    Full warfarin instructions:  8/18: 5 mg; Otherwise 5 mg every Mon, Wed, Fri; 2.5 mg all other days   Next INR check:  9/1/2022             Telephone call with Pat who verbalizes understanding and agrees to plan    Check at provider office visit    Education provided: Goal range and significance of current result    Plan made per [facility] anticoagulation protocol    Hilda Lund, RN  Anticoagulation Clinic  8/18/2022    _______________________________________________________________________     Anticoagulation Episode Summary     Current INR goal:  2.0-3.0   TTR:  57.1 % (1 y)   Target end date:  Indefinite   Send INR reminders to:  [facility]    Indications    Chronic atrial fibrillation (H) [I48.20]  Chronic deep vein thrombosis (DVT) of axillary vein (H) [I82.A29]           Comments:           Anticoagulation Care Providers     Provider Role Specialty Phone number    Loli Alberts MD Referring Family Medicine [phone]

## 2022-08-30 NOTE — ADDENDUM NOTE
Encounter addended by: Elvira Can, OT on: 8/30/2022 6:02 PM   Actions taken: Document created, Document edited

## 2022-08-30 NOTE — TELEPHONE ENCOUNTER
Forms Request  Name of form/paperwork: [employer]  Have you been seen for this request: N/A  Do we have the form: yes, in providers inbox  When is form needed by: when done  How would you like the form returned: fax  Patient Notified form requests are processed in 3-5 business days: n/a    Okay to leave a detailed message? n/a

## 2022-08-30 NOTE — PROGRESS NOTES
[facility]          OUTPATIENT OCCUPATIONAL THERAPY  EVALUATION  PLAN OF TREATMENT FOR OUTPATIENT REHABILITATION  (COMPLETE FOR INITIAL CLAIMS ONLY)  Patient's Last Name, First Name, M.I.  YOB: 1940  EleazarDaltonElvira  J                        Provider's Name  [facility] Medical Record No.  [id number]                               Onset Date:     07/19/22   Start of Care Date:     08/30/22   Type:     ___PT   _X_OT   ___SLP Medical Diagnosis:     Lymphedema of left upper extremity                          OT Diagnosis:     left UE limited AROM, strength and sensation, decreased ADL and IADL function Visits from SOC:  1   _________________________________________________________________________________  Plan of Treatment/Functional Goals:  (P) ADL training, IADL training, Self care/Home management     Goals  Goal Description: Patient will demonstrate knowledge of Adaptive equipment to increase ease and independence with ADL's and IADL's  Target Date: 11/28/22     Goal Description: Patient will demonstrate knowledge of compensatory strategies for one handed ADL and IADL's  Target Date: 11/28/22     Therapy Frequency: 1-3 visits     Predicted Duration of Therapy Intervention (days/wks): 12 weeks  Elvira Can OT          I CERTIFY THE NEED FOR THESE SERVICES FURNISHED UNDER        THIS PLAN OF TREATMENT AND WHILE UNDER MY CARE     (Physician co-signature of this document indicates review and certification of the therapy plan).              Certification date from: (P) 08/30/22, Certification date to: (P) 11/28/22               Referring Physician: Dr. Loli Alberts     Initial Assessment        See Epic Evaluation      Start Of Care Date: 08/30/22 08/30/22 1300   Quick Adds   Quick Adds Certification   Type of Visit Initial Outpatient Occupational Therapy  "Evaluation   General Information   Start Of Care Date 08/30/22   Referring Physician Dr. Loli Alberts   Orders Evaluate and treat as indicated   Orders Date 07/19/22   Medical Diagnosis Lymphedema of left upper extremity   Onset of Illness/Injury or Date of Surgery 07/19/22   Precautions/Limitations No known precautions/limitations   Surgical/Medical History Reviewed Yes   Additional Occupational Profile Info/Pertinent History of Current Problem Per PT note in EMR on 5-4-22, \"Patient presents to PT with left UE swelling and severely limited ROM. This started progressing over the last few months. She had history of left wrist carpal tunnel release June 2021 and has been diagnosed with metastatic breast CA with tumor in left elbow and cancer on skin on left breast and left posterior upper quadrant. She has severely limited ROM in left arm and having a hard time with reaching/doing ADLs. She lives alone in a senior living complex. Her daughter is here for the appt today. Patient doesn't drive. Her cancer was originally diagnosed 8 years ago when she had lumpectomy and radiation. Hasn't had any issues/recurrence since then until the past few months when all of these issues started.\" PT has been focused on lymphedema treatmemnt and AROM to left UE.   Role/Living Environment   Role/Living Environment Comments Patient lives in senior apartment with help from family.   Pain   Patient currently in pain Yes   Pain location left shoulder   Pain rating 4/10 best and 10/10 worst   Fall Risk Screen   Fall screen completed by OT   Have you fallen 2 or more times in the past year? No   Have you fallen and had an injury in the past year? No   Is patient a fall risk? No   Abuse Screen (yes response referral indicated)   Feels Unsafe at Home or Work/School no   Feels Threatened by Someone no   Does Anyone Try to Keep You From Having Contact with Others or Doing Things Outside Your Home? no   Physical Signs of Abuse Present no "   Patient needs abuse support services and resources No   Sensation   Sensation Comments Patient reports significant numbness and tingling in left UE   Left Shoulder Flexion ROM   Left Shoulder Flexion AROM - degrees 26   Left Shoulder ABduction ROM   Left Shoulder ABduction AROM - degrees 20   Left Elbow Extension/Flexion ROM   Left Elbow Extension/Flexion AROM - degrees -41/52   Left Forearm Supination ROM   Left Forearm Supination AROM - degrees 20   Left Forearm Pronation ROM   Left Forearm Pronation AROM - degrees WNL   Left Wrist Flexion ROM   Left Wrist Flexion AROM - degrees 34   Left Wrist Extension ROM   Left Wrist Extension AROM - degrees 2   Hand Strength   Hand Dominance Right   Coordination   Coordination Comments Left UE coordination is severly impaired due to lymphedema.   Transfer Skill   Level of [place]: Transfers independent   Bathing   Level of [place] - Bathing independent   Bathing Comments Patient uses right UE for all bathing tasks   Upper Body Dressing   Level of [place]: Dress Upper Body independent   Upper Body Dressing Comments patient uses josh techniques to get dressed. Has significant difficulty donning and doffing her bra.   Lower Body Dressing   Level of [place]: Dress Lower Body independent   Lower Body Dressing Comments Patient can't perform fasteners   Toileting   Level of [place]: Toilet independent   Grooming   Level of [place]: Grooming independent   Grooming Comments Patient performs with right hand only. Unable to floss well.   Eating/Self-Feeding   Level of [place]: Eating independent   Eating/Self Feeding Comments Patient can't cut food and eats with right hand only   Instrumental Activities of Daily Living Assessment   IADL Assessment/Observations Patient is no longer driving. Patient is independent with laundry but states that it is difficult. She has a cleaning person once a month and daughter helps. She has significant difficulty  changing linens on the bed. She does not do much cooking. She is independent with medications and financial management.   Planned Therapy Interventions   Planned Therapy Interventions ADL training;IADL training;Self care/Home management    OT Goal 1   Goal Description Patient will demonstrate knowledge of Adaptive equipment to increase ease and independence with ADL's and IADL's   Target Date 11/28/22    OT Goal 2   Goal Description Patient will demonstrate knowledge of compensatory strategies for one handed ADL and IADL's   Target Date 11/28/22   Clinical Impression   Criteria for Skilled Therapeutic Interventions Met Yes, treatment indicated   OT Diagnosis left UE limited AROM, strength and sensation, decreased ADL and IADL function   Clinical Decision Making (Complexity) Low complexity   Therapy Frequency 1-3 visits   Predicted Duration of Therapy Intervention (days/wks) 12 weeks   Risks and Benefits of Treatment have been explained. Yes   Patient, Family & other staff in agreement with plan of care Yes   Education Assessment   Barriers To Learning No Barriers   Therapy Certification   Certification date from 08/30/22   Certification date to 11/28/22   Certification I certify the need for these services furnished under this plan of treatment and while under my care.  (Physician co-signature of this document indicates review and certification of the therapy plan)   Total Evaluation Time   OT Eval, Low Complexity Minutes (18853) 32

## 2022-09-01 NOTE — PROGRESS NOTES
Metastatic breast cancer (H)  Her current pain is uncontrolled.  I highly recommended that she be assessed and treated through palliative care services.  We had a long discussion of why this would be beneficial to her.  She does not seem to recognize the fact that she should be emergently preparing for the future.  She thinks she can still live at home although this is getting more more difficult for her daughter.  I will refill her Tylenol 3 prescription.  - acetaminophen-codeine (TYLENOL #3) 300-30 MG tablet  Dispense: 45 tablet; Refill: 0    Lymphedema of left upper extremity  Getting treatment regularly including lymphedema treatment with a device that I had to recently approve which has been helpful.    Pain of left upper arm  - Palliative Care Referral    Cancer related pain  She has been given Neurontin in the past but did not think it was helpful.  She was just taking it as needed and expected that it would help immediately.  I explained that she needs to continue to take it so that it has time to work in her system.  I will refill her medication again.  - gabapentin (NEURONTIN) 300 MG capsule  Dispense: 270 capsule; Refill: 1    Skin irritation  They are worried about infection.  I do not want to give her antibiotics without some evidence as it appears to me that she has changes that are from the radiation and edema.  I will do a CBC and differential and if it appears she may be infected I will treat her.  - CBC with platelets and differential  - CBC with platelets and differential    Chronic deep vein thrombosis (DVT) of axillary vein of left upper extremity (H)  The lymphedema along with the cancer and decreased mobility of the arm has caused DVT to occur in that arm which is chronic.    Chronic atrial fibrillation (H)  She is anticoagulated on warfarin and has been for a long time and despite this she still has gotten the DVT.  I will refill her meds and check her lab.  - warfarin ANTICOAGULANT  (COUMADIN) 5 MG tablet  Dispense: 80 tablet; Refill: 1  - INR point of care    I will get back to them on lab results by [facility] and only call with grossly abnormal values.  I suggested if the patient was not willing to move as of yet, then she needs to get evaluated by palliative Care now through our system.  It can transition then over to wherever she will get her care when she lives with her daughter.  She can follow-up in 3 months if she is still at her house.    45 minutes spent with the patient and her daughter during the visit, in preparation and in documentation of the visit.    Subjective   Kym is a 82 year old accompanied by her daughter, presenting for the following health issues:  Medication Update (Medication check. No concerns. Needs refills. )  This patient has a history of metastatic breast cancer and is suffering with left upper extremity edema which is partially because of lymph node removal and involvement with the history of breast cancer, radiation to the area and more recently now with a DVT in that left upper extremity.  She is in quite a bit of discomfort due to the swelling on top of the right pain.  She has a lot of irritation and redness in the area and she is wondering if she has infection.  We discussed palliative services versus hospice services.  I highly recommended that she consider having palliative care see her to set up services she needs currently.  We discussed that when she is ready and does not want to do further life saving measures, and she is within about 6 months of expected death, that she can transition to hospice services very easily.  Currently she is living alone which is problematic as her daughter has to come up from her home which is a bit further out from the [facility] every time she has an appointment which is relatively frequent.  They have plans for her to move in with her daughter and we discussed whether it makes sense to get palliative care on board once she  moves versus not.    History of Present Illness       Reason for visit:  Medication check and inr    She eats 0-1 servings of fruits and vegetables daily.She consumes 0 sweetened beverage(s) daily.She exercises with enough effort to increase her heart rate 9 or less minutes per day.    She is taking medications regularly.       Objective    /88 (BP Location: Right arm, Patient Position: Sitting, Cuff Size: Adult Regular)   Pulse 61   Resp 14   Wt 47.8 kg (105 lb 6.4 oz)   LMP  (LMP Unknown)   SpO2 100%   Breastfeeding No   BMI 19.28 kg/m    Body mass index is 19.28 kg/m .  Physical Exam   GENERAL: alert, no distress, frail, elderly and underweight  RESP: decreased breath sounds  CV:  irregular rhythm  ABDOMEN: soft, nontender  MS: no gross musculoskeletal defects noted bilateral lower extremities and right upper extremity, left upper extremity is in a compression sleeve   SKIN: The skin on her left upper chest and shoulder is red, irritated, somewhat flaky appearing but not warm to the touch.  PSYCH: mentation appears normal, affect flat and judgement and insight impaired

## 2022-09-01 NOTE — PROGRESS NOTES
ANTICOAGULATION MANAGEMENT     Elvira Bush 82 year old female is on warfarin with therapeutic INR result. (Goal INR 2.0-3.0)    Recent labs: (last 7 days)     09/01/22  1331   INR 2.4*       ASSESSMENT       Source(s): Chart Review, Patient/Caregiver Call and Template       Warfarin doses taken: Warfarin taken as instructed and Template incorrect; verbally confirmed home dose with Pat     Diet: No new diet changes identified    New illness, injury, or hospitalization: No    Medication/supplement changes: None noted    Signs or symptoms of bleeding or clotting: No    Previous INR: Subtherapeutic    Additional findings: None       PLAN     Recommended plan for no diet, medication or health factor changes affecting INR     Dosing Instructions: Continue your current warfarin dose with next INR in 2 weeks       Summary  As of 9/1/2022    Full warfarin instructions:  5 mg every Mon, Wed, Fri; 2.5 mg all other days   Next INR check:  9/15/2022             Telephone call with Pat who verbalizes understanding and agrees to plan    Lab visit scheduled    Education provided: Goal range and significance of current result, Importance of therapeutic range, Importance of following up at instructed interval and Importance of taking warfarin as instructed    Plan made per [facility] anticoagulation protocol    Santa Shultz RN  Anticoagulation Clinic  9/1/2022    _______________________________________________________________________     Anticoagulation Episode Summary     Current INR goal:  2.0-3.0   TTR:  55.5 % (1 y)   Target end date:  Indefinite   Send INR reminders to:  [facility]    Indications    Chronic atrial fibrillation (H) [I48.20]  Chronic deep vein thrombosis (DVT) of axillary vein (H) [I82.A29]           Comments:           Anticoagulation Care Providers     Provider Role Specialty Phone number    Loli Alberts MD Referring Family Medicine [phone]

## 2022-09-09 NOTE — PROGRESS NOTES
Assessment & Plan     Cellulitis of left upper extremity  -Given her lymphedema as well the appearance of her arm I do think that this is cellulitis.  I will treat with doxycycline for now, she will continue covering the area.  If she does not notice that things are improving through the weekend or if she continues to feel worse through the weekend she will reach out and we can double cover her with amoxicillin as well.  In addition to this I recommended she talk with her physical therapist about using Tubigrip on her arm rather than just the compressive sleeves that this may be easier for her to get on as she lives alone.  - doxycycline hyclate (VIBRA-TABS) 100 MG tablet  Dispense: 14 tablet; Refill: 0               No follow-ups on file.    Brenna Mike MD  [facility]    Heidy Mejía is a 82 year old, presenting for the following health issues:  Rash and Swelling Around Both Eyes      Rash  Associated symptoms include a rash.   History of Present Illness       Reason for visit:  Medication check and inr    She eats 0-1 servings of fruits and vegetables daily.She consumes 0 sweetened beverage(s) daily.She exercises with enough effort to increase her heart rate 9 or less minutes per day.    She is taking medications regularly.     She has had a rash for the last week. She used a patch on the rash due to some weeping on her arm. She does have a rash related to the patch. She does wonder about an antibiotic. She has been using Eucerin on it. She doesn't note that anything is helping.     No systemic symptoms, no itching.     Review of Systems   Skin: Positive for rash.            Objective    /80   Pulse 61   Wt 47.2 kg (104 lb)   LMP  (LMP Unknown)   SpO2 100%   BMI 19.02 kg/m    Body mass index is 19.02 kg/m .  Physical Exam   Left arm, distal to the elbow, lateral, erythema, broken skin and weeping.  Significant enlargement of the left arm compared to the right arm  consistent with her lymphedema diagnosis  In general she is otherwise well-appearing

## 2022-09-09 NOTE — TELEPHONE ENCOUNTER
"Nurse Triage SBAR    Is this a 2nd Level Triage? NO    Situation: Patient has lymphedema in her left arm and is noticing increased drainage and some redness at the area    Background: history of cancer and lymphedema     Assessment: lymphedema in her arm   Swollen started to weep  States \"got infected around the edges of the patches\" she put on  States this weeping is new  States clear fluid   No fever  Red - reports red area is about 3 inches  Not painful  States no open wounds  Changes started over the weekend  States today she feels like the redness has decreased some      Protocol Recommended Disposition:   No disposition on file.    Recommendation: recommended an appointment, if nothing available to be seen in the [facility]- upon transferring to scheduling, call was disconnected and scheduling will call her back to assist with appointment need     transferred to [facility]    Does the patient meet one of the following criteria for ADS visit consideration? 16+ years old, with an [facility] PCP     TIP  Providers, please consider if this condition is appropriate for management at one of our Acute and Diagnostic Services sites.     If patient is a good candidate, please use dotphrase <dot>triageresponse and select Refer to ADS to document.    Reason for Disposition    MODERATE arm swelling (e.g., puffiness or swollen feeling of entire arm)    Additional Information    Negative: SEVERE difficulty breathing (e.g., struggling for each breath, speaks in single words)    Negative: Sounds like a life-threatening emergency to the triager    Negative: Chest pain    Negative: Followed an arm injury    Negative: Small area of LOCALIZED swelling followed an insect bite to the area    Negative: Swelling of wrist is main symptom    Negative: Swelling of elbow is main symptom    Negative: Cast problems or questions    Negative: Pain, redness, or swelling intravenous (IV) site or along course of vein    Negative: SEVERE arm swelling (e.g., " all of arm looks swollen)    Negative: [1] MODERATE arm swelling (e.g., puffiness or swollen feeling of entire arm) and [2] PICC Line    Negative: [1] MODERATE arm swelling and [2] central venous catheter (central line, internal jugular line)    Negative: Difficulty breathing at rest    Negative: Looks like a broken bone or dislocated joint (e.g., crooked or deformed)    Negative: Entire hand is cool or blue in comparison to other side    Negative: [1] Can't use arm or can barely use arm AND [2] new-onset    Negative: [1] Difficulty breathing with exertion (e.g., walking) AND [2] new-onset or worsening    Negative: [1] Red area or streak AND [2] fever    Negative: [1] Swelling is painful to touch AND [2] fever    Negative: [1] Cast on arm AND [2] now increased pain    Negative: Patient sounds very sick or weak to the triager    Negative: [1] Pregnant 20 or more weeks AND [2] face swelling    Negative: [1] Pregnant 20 or more weeks AND [2] new blurred vision or vision changes    Negative: [1] Postpartum (from 0 to 6 weeks after delivery) AND [2] new blurred vision or vision changes    Negative: [1] Postpartum (from 0 to 6 weeks after delivery) AND [2] face swelling    Negative: [1] Red area or streak [2] large (> 2 in. or 5 cm)    Protocols used: ARM SWELLING AND EDEMA-A-AH

## 2022-09-15 NOTE — PROGRESS NOTES
ANTICOAGULATION MANAGEMENT     Elvira Bush 82 year old female is on warfarin with therapeutic INR result. (Goal INR 2.0-3.0)    Recent labs: (last 7 days)     09/15/22  1102   INR 2.3*       ASSESSMENT       Source(s): Chart Review and Template       Warfarin doses taken: Warfarin taken as instructed    Diet: No new diet changes identified    New illness, injury, or hospitalization: No    Medication/supplement changes: None noted    Signs or symptoms of bleeding or clotting: No    Previous INR: Therapeutic last visit; previously outside of goal range    Additional findings: None       PLAN     Recommended plan for no diet, medication or health factor changes affecting INR     Dosing Instructions: Continue your current warfarin dose with next INR in 4 weeks       Summary  As of 9/15/2022    Full warfarin instructions:  5 mg every Mon, Wed, Fri; 2.5 mg all other days   Next INR check:  10/13/2022             Telephone call with Kym who verbalizes understanding and agrees to plan    Lab visit scheduled    Education provided: Importance of notifying clinic for changes in medications; a sooner lab recheck maybe needed., Importance of notifying clinic of upcoming surgeries and procedures 2 weeks in advance and Contact [phone] with any changes, questions or concerns.     Plan made per [facility] anticoagulation protocol    Santa Shultz RN  Anticoagulation Clinic  9/15/2022    _______________________________________________________________________     Anticoagulation Episode Summary     Current INR goal:  2.0-3.0   TTR:  55.5 % (1 y)   Target end date:  Indefinite   Send INR reminders to:  [facility]    Indications    Chronic atrial fibrillation (H) [I48.20]  Chronic deep vein thrombosis (DVT) of axillary vein (H) [I82.A29]           Comments:           Anticoagulation Care Providers     Provider Role Specialty Phone number    Loli Alberts MD Referring Family Medicine [phone]

## 2022-09-25 NOTE — TELEPHONE ENCOUNTER
"Last Written Prescription Date:  6/27/22  Last Fill Quantity: 90,  # refills: 0   Last office visit provider:  9/9/22     Requested Prescriptions   Pending Prescriptions Disp Refills     lisinopril (ZESTRIL) 20 MG tablet [Pharmacy Med Name: LISINOPRIL 20 MG TABLET] 90 tablet 0     Sig: TAKE 1 TABLET BY MOUTH EVERY DAY       ACE Inhibitors (Including Combos) Protocol Passed - 9/24/2022  8:17 AM        Passed - Blood pressure under 140/90 in past 12 months     BP Readings from Last 3 Encounters:   09/09/22 110/80   09/01/22 122/88   05/17/22 138/88                 Passed - Recent (12 mo) or future (30 days) visit within the authorizing provider's specialty     Patient has had an office visit with the authorizing provider or a provider within the authorizing providers department within the previous 12 mos or has a future within next 30 days. See \"Patient Info\" tab in inbasket, or \"Choose Columns\" in Meds & Orders section of the refill encounter.              Passed - Medication is active on med list        Passed - Patient is age 18 or older        Passed - No active pregnancy on record        Passed - Normal serum creatinine on file in past 12 months     Recent Labs   Lab Test 05/17/22  1318   CR 0.81       Ok to refill medication if creatinine is low          Passed - Normal serum potassium on file in past 12 months     Recent Labs   Lab Test 05/17/22  1318   POTASSIUM 3.8             Passed - No positive pregnancy test within past 12 months           hydrochlorothiazide (HYDRODIURIL) 25 MG tablet [Pharmacy Med Name: HYDROCHLOROTHIAZIDE 25 MG TAB] 90 tablet 0     Sig: TAKE 1 TABLET BY MOUTH EVERY DAY       Diuretics (Including Combos) Protocol Passed - 9/24/2022  8:17 AM        Passed - Blood pressure under 140/90 in past 12 months     BP Readings from Last 3 Encounters:   09/09/22 110/80   09/01/22 122/88   05/17/22 138/88                 Passed - Recent (12 mo) or future (30 days) visit within the authorizing " "provider's specialty     Patient has had an office visit with the authorizing provider or a provider within the authorizing providers department within the previous 12 mos or has a future within next 30 days. See \"Patient Info\" tab in inbasket, or \"Choose Columns\" in Meds & Orders section of the refill encounter.              Passed - Medication is active on med list        Passed - Patient is age 18 or older        Passed - No active pregancy on record        Passed - Normal serum creatinine on file in past 12 months     Recent Labs   Lab Test 05/17/22  1318   CR 0.81              Passed - Normal serum potassium on file in past 12 months     Recent Labs   Lab Test 05/17/22  1318   POTASSIUM 3.8                    Passed - Normal serum sodium on file in past 12 months     Recent Labs   Lab Test 05/17/22  1318                 Passed - No positive pregnancy test in past 12 months             Carmela Covarrubias 09/25/22 12:47 PM  "

## 2022-10-06 NOTE — LETTER
"    10/6/2022         RE: Elvira Bush  [address]  [facility] [address]        Dear Colleague,    Thank you for referring your patient, Elvira Bush, to the [facility] RADIATION ONCOLOGY [place]. Please see a copy of my visit note below.    Oncology Rooming Note    October 6, 2022 11:47 AM   Elvira Bush is a 82 year old female who presents for:    Chief Complaint   Patient presents with     Oncology Clinic Visit     Consult with Palliative     Palliative     Initial Vitals: BP (!) 157/91 (BP Location: Right arm, Patient Position: Sitting, Cuff Size: Adult Regular)   Pulse 65   Temp 97.7  F (36.5  C) (Oral)   Resp 16   Wt 49 kg (108 lb)   LMP  (LMP Unknown)   SpO2 96%   BMI 19.75 kg/m   Estimated body mass index is 19.75 kg/m  as calculated from the following:    Height as of 5/17/22: 1.575 m (5' 2\").    Weight as of this encounter: 49 kg (108 lb). Body surface area is 1.46 meters squared.  No Pain (0) Comment: Data Unavailable   No LMP recorded (lmp unknown). Patient is postmenopausal.  Allergies reviewed: Yes  Medications reviewed: Yes    Medications: Medication refills not needed today.  Pharmacy name entered into [employer] [facility]: [id number] [place] [address]    Clinical concerns: Lymphedema left arm, recurrent disease not on active treatment Loren Vidal NP was notified.     Patient here ambulatory accompanied by daughter-Na and son-Edgar.        Sheridan Crow RN                Again, thank you for allowing me to participate in the care of your patient.        Sincerely,        Jocelyn Vidal, CNS    "

## 2022-10-06 NOTE — NURSING NOTE
[facility]  Palliative Care Consultation Note    Patient: Elvira Bush    Requesting Clinician / Team: 4/11/2022 Dr. Escalona and 9/1/2022 Dr. Alberts  Reason for consult: Goals of care  Patient and family support    Code status: DNR/I    Impression & Recommendations:  SYMPTOM ASSESSMENT  1. Peripheral neuropathy - LUE  - Continue Gabapentin 100 mg twice daily. No dose adjustments at this time.    2. 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  - Discussed using a arm sling when out and about to support arm.    3. Cancer related fatigue  - activity as tolerated.    4. Slow transit Constipation  - Senna 1 tablet daily to every other day as needed.    ADVANCED CARE PLANNING  - POLST completed today. Copies made for patient and family. Copy sent to [facility] for placement in electronic medical record.  - HCD on file.  - Surrogate decision makers: 3 children, Na Rosenbaum, Miguel Bush and Satish Bush.  - Hospice consulted for information. Patient's children, Na and Miguel, should be present for discussion.    GOALS OF CARE DISCUSSION  - Confirmed DNR/I, wishes for comfort focused care, no feeding tube or artificial nutrition and agreeable to oral antibiotics.  - Patient does not want chemotherapy or radiation.  - interested in interventions to promote quality of life.    Please contact [facility] Palliative Care [phone] with questions or needs.    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 Patient also with noted LUE  "lymphedema and subcutaneous signs of breast cancer progression from left back extending anteriorly to left breast.    Today, the patient was seen for:  Goals of care discussion, neuropathy, patient and family support    Prognosis, Goals, & Planning:      Functional Status just prior to hospitalization: 3 (Capable of only limited self-care; needs help with ADLs; in bed/chair >50% of waking hours)      Prognosis, Goals, and/or Advance Care Planning were addressed today: Yes        Summary/Comments: Met face to face in palliative care clinic today with patient, daughterNa, and sonMiguel. Reviewed that Palliative Care is specialized medical care for people with serious illness, focused on providing patients with relief from symptoms, pain and stresses of serious illness.  The goal is to improve quality of life for both the patient and the family.   Patient and family outline a progressive decline over the last 4-6 months becoming increasing dependent on ADL and IADLs as patient losing movement and dexterity in LUE due to lymphedema and \"heaviness.\" Na comes to stay with patient 2-3 days a week from [place] and Edgar lives 5 minutes away.  Can no longer cook and has significant difficulties bathing and dressing self.    Patient confirm wishes for more comfort focused care and quality of life. Confirms DNR/I.  Reviewed the role of hospice and that this could be an excellent resource. Agrreable to informational consult.      Patient's decision making preferences: shared with support from loved ones          Patient has decision-making capacity today for complex decisions: Yes            I have concerns about the patient/family's health literacy today: No           Patient has a completed Health Care Directive: Yes, and on file.      Code status: No CPR / No Intubation    Coping, Meaning, & Spirituality:   Mood, coping, and/or meaning in the context of serious illness were addressed today: No    Key Palliative " Symptom Data:  # Pain severity the last 12 hours: none  # Dyspnea severity the last 12 hours: none  # Nausea severity the last 12 hours: none  # Anxiety severity the last 12 hours: none  Anorexia: Moderate. Loss of interest in food. Poor appetite.  Fatigue: Moderate  Cough: None  Constipation: intermittent. Relieve with senna prn.  : no difficulties urinating or painful urination    ROS:  Comprehensive ROS is reviewed and is negative except as here & per HPI.     Past Medical History:  Past Medical History:   Diagnosis Date     Acute respiratory failure, unspecified whether with hypoxia or hypercapnia (H)      Atrial fibrillation (H)      Breast cancer (H) 3-2014     Breast cancer of lower-outer quadrant of left female breast (H)     Created by Conversion      Closed displaced fracture of left pubis, initial encounter (H)      Closed fracture of spinous process of lumbar vertebra (H) 9/9/2017     Encephalopathy      GERD (gastroesophageal reflux disease)      HTN (hypertension)      Hx of radiation therapy 5-2014    Left Breast     Hypercholesterolemia      Intraabdominal hemorrhage 9/9/2017     Osteopenia      Pelvic fracture (H) 9/9/2017     Seizures (H)      Skin cancer      Transient alteration of awareness         Past Surgical History:  Past Surgical History:   Procedure Laterality Date     ARTHROSCOPY KNEE Right 2009     BIOPSY BREAST Left 4/21/2021    Procedure: LEFT BREAST BIOPSY TIMES 2;  Surgeon: Didier Durán MD;  Location: [facility];  Service: General     LUMPECTOMY BREAST Left 3-2014     [facility]  7/27/2017          SKIN CANCER EXCISION       TENDON RELEASE           Family History:  Family History   Problem Relation Age of Onset     Coronary Artery Disease Mother      No Known Problems Daughter      Cerebrovascular Disease Maternal Grandmother      Prostate Cancer Maternal Grandfather      Cerebral aneurysm Maternal Aunt      No Known Problems Paternal Aunt      Skin Cancer Son       Hereditary Breast and Ovarian Cancer Syndrome No family hx of      Breast Cancer No family hx of      Cancer No family hx of      Colon Cancer No family hx of      Endometrial Cancer No family hx of      Ovarian Cancer No family hx of         Social:     Living situation: lives in senior apartment with family support    Key family / caregivers: sonMiguel and daughter, Na Rosenbaum    Current in-home services: none     Allergies:  No Known Allergies     Medications:  I have reviewed this patient's medication profile and PDMP.     Lab Results: personally reviewed.   Lab Results   Component Value Date     05/17/2022    CO2 25 05/17/2022    BUN 21 05/17/2022     Lab Results   Component Value Date    WBC 7.2 09/01/2022    HGB 11.7 09/01/2022    HCT 36.1 09/01/2022    MCV 90 09/01/2022     09/01/2022     AST   Date Value Ref Range Status   11/09/2021 29 0 - 40 U/L Final     ALT   Date Value Ref Range Status   11/09/2021 24 0 - 45 U/L Final     Alkaline Phosphatase   Date Value Ref Range Status   11/09/2021 68 45 - 120 U/L Final     Albumin   Date Value Ref Range Status   11/09/2021 3.8 3.5 - 5.0 g/dL Final       RADIOLOGY:  No results found.      Physical Exam:  Temp:  [97.7  F (36.5  C)] 97.7  F (36.5  C)  Pulse:  [65] 65  Resp:  [16] 16  BP: (157)/(91) 157/91  SpO2:  [96 %] 96 %  Wt Readings from Last 3 Encounters:   10/06/22 49 kg (108 lb)   09/09/22 47.2 kg (104 lb)   09/01/22 47.8 kg (105 lb 6.4 oz)      General appearance: alert, cachectic, cooperative, fatigued and no distress  Head: Normocephalic, without obvious abnormality, atraumatic  Eyes: lids and lashes normal. Sclera anicteric. Pupils round.  Nose: no discharge  Throat: lips, mucosa, and tongue normal; teeth and gums normal  Neck: no JVD and supple, symmetrical, trachea midline  Lungs: non-labored  Extremities: LUE lymphedema and decreased movement  Pulses: 2+ and symmetric  Skin: Purplish discoloration left upper back extending to  left breast anteriorly. Left breast indurated and nipple fixed, left breast hard and nodular.   Neurologic: Alert. Oriented x4    Discussed that changes in skin and left breast noted above likely secondary to progression of breast cancer and that hospice would be a resource for patient and family. Family agree.    TTS: I have personally spent a total of 100 minutes today in above encounter reviewing patient's medical record, consultation with the medical providers, and > 50% of this time spent assessing patient and symptoms, reviewing clinical data (radiology reports and labs) and providing emotional support.    YONAS Vidal, MICHELLE, CNS  Palliative Care  [phone]

## 2022-10-06 NOTE — PROGRESS NOTES
"Oncology Rooming Note    October 6, 2022 11:47 AM   Elvira Bush is a 82 year old female who presents for:    Chief Complaint   Patient presents with     Oncology Clinic Visit     Consult with Palliative     Palliative     Initial Vitals: BP (!) 157/91 (BP Location: Right arm, Patient Position: Sitting, Cuff Size: Adult Regular)   Pulse 65   Temp 97.7  F (36.5  C) (Oral)   Resp 16   Wt 49 kg (108 lb)   LMP  (LMP Unknown)   SpO2 96%   BMI 19.75 kg/m   Estimated body mass index is 19.75 kg/m  as calculated from the following:    Height as of 5/17/22: 1.575 m (5' 2\").    Weight as of this encounter: 49 kg (108 lb). Body surface area is 1.46 meters squared.  No Pain (0) Comment: Data Unavailable   No LMP recorded (lmp unknown). Patient is postmenopausal.  Allergies reviewed: Yes  Medications reviewed: Yes    Medications: Medication refills not needed today.  Pharm[facility] into [id number] [place] [address]    Clinical concerns: Lymphedema left arm, recurrent disease not on active treatment Loren Vidal NP was notified.     Patient here ambulatory accompanied by daughter-Na and son-Edgar.        Sheridan Crow RN            "

## 2022-10-06 NOTE — PATIENT INSTRUCTIONS
Constipation:  - Recommend Senna 1 tablet daily as needed. Consider taking daily or every other day for constipation. Hold for loose stools.    Peripheral neuropathy:  - Continue Gabapentin 100 mg twice daily. No dose adjustment today.    POLST completed and copies given to you. Please place on your refrigerator.    Follow up in 8-10 weeks with palliative care. Dr. Pelayo will be the provider you will see.  Call [phone] with questions.

## 2022-10-10 NOTE — PROGRESS NOTES
[facility]    OUTPATIENT PHYSICAL THERAPY  PLAN OF TREATMENT FOR OUTPATIENT REHABILITATION AND PROGRESS NOTE           Patient's Last Name, First Name, Elvira Tobar Date of Birth  1940   Provider's Name  [facility] Medical Record No.  [id number]    Onset Date  8/1/2021 Start of Care Date  5/4/2022   Type:     _X_PT   ___OT   ___SLP Medical Diagnosis  Lymphedema secondary to recurrent breast CA   PT Diagnosis  Left UE lymphedema, Breast CA, decreased shoulder ROM, decreased elbow ROM Plan of Treatment  Frequency/Duration: 1 time per week to every other week for up to 24 visits total as needed.  Certification date from 9/11/2022 to 12/7/2022       Goals: see below  Beginning/End Dates of Progress Note Reporting Period:  5/4/2022 to 10/10/2022       Client Self (Subjective) Report for Progress Note Reporting Period: Patient comes to clinic without having been here for 1 month. She had to cancel her last PT session d/t being out of town. She states she did receive her compression pump, but hasn't been able to use it consistently yet. Will hope to use it daily going forward. She can't don it herself, so her son comes to don it for her daily.    Objective Measurements: see below       I CERTIFY THE NEED FOR THESE SERVICES FURNISHED UNDER        THIS PLAN OF TREATMENT AND WHILE UNDER MY CARE     (Physician co-signature of this document indicates review and certification of the therapy plan).                Referring Provider: Dr. Alberts (PCP) / Dr. Durán (Surgeon outside of [facility])    Loli Galicia, PT         10/10/22 1100   Signing Clinician's Name / Credentials   Signing clinician's name / credentials Loli Galicia, PT   Session Number   Session Number 16/18 per med cert through 12/7/2022   Additional Session Number updated med cert on  10/10/2022, visit 16   Progress Note/Recertification   Progress Note Completed Date 10/10/22   Recertification Due Date 12/07/22   Adult Goals   Edema Eval Goals 1;2;3   Goal 1   Goal identifier shoulder ROM   Goal description patient will improve left shoulder AROM flexion to 120 degrees for functional reach for dressing   Goal Progress NOT MET YET   Target date 12/07/22   Goal 2   Goal identifier elbow ROM   Goal description patient will improve left elbow flexion to >110 degrees for improved functional reach with ADLs   Goal Progress NOT MET YET   Target date 12/07/22   Goal 3   Goal identifier swelling   Goal description Patient will reduce swelling by 10% of original volume for improved function   Goal Progress was MET when measuring right out of wraps, but not today, despite having worn compression consistently for months. She has a compression pump now and will start being able to use that to help   Target date 12/07/22   Subjective Report   Subjective Report Patient comes to clinic without having been here for 1 month. She had to cancel her last PT session d/t being out of town. She states she did receive her compression pump, but hasn't been able to use it consistently yet. Will hope to use it daily going forward. She can't don it herself, so her son comes to don it for her daily.   Objective Measures   Objective Measures Objective Measure 1;Objective Measure 2;Objective Measure 3;Objective Measure 4   Objective Measure 1   Objective Measure see volume measurement flowsheet for updated volume of left arm today.   Objective Measure 3   Objective Measure wound on left shoulder blade region: 3cmx2.3cm, yellow slough,moist wound bed.   Details patient has it covered with [id number].   Treatment Interventions   Interventions Manual Therapy;Therapeutic Procedure/Exercise;Self Care/Home Management   Therapeutic Procedure/exercise   Progress HEP (from PTRx): AAROM elbow flexion, AAROM shoulder flexion (using dowel),  wrist extension/flexion AAROM and AROM, making a fist, added squeezing foam blocks to HEP (red and blue blocks given). To perform 10 times 1-2 times per day.   Manual Therapy   Manual Therapy: Mobilization, MFR, MLD, friction massage minutes (13332) 48   Skilled Intervention Patient sits on chair with pillow under left arm during session d/t discomfort with supine lying. MLD just to left UE to soften the swelling and to patient's comfort level. Donned left arm sleeve. (patient didn't have compression glove in clinic today). wrapped 8cmx5m wrist to upper arm for added compression over the sleeve.   Patient Response tolerates   Treatment Detail measured L UE volume, measured wound size, ed to keep guaze over the wound constantly and to cahnge daily.   Equipment Needs   Equipment Needs wound care supplies?   Education   Learner Patient   Communication with other professionals   Communication with other professionals PT sent message to palliative care nurse about wound care supplies. Patient unsure what to use.   Plan   Plan for next session Continue with every other week sessions for swelling management - wrapping and fibrotic softening.   Comments   Comments Assessment: Patient continues to have severe swelling in left arm despite continued PT. She returns to clinic after not being in clinic for a month d/t going on a vacation for a little time. She does want to continue with further PT sessions as she finds benefit from manual therapy and wrapping as needed. She does have her compression pump that she recently received. She will start using it daily (with the help of her son donning it for her). She has a new wound on her left scapula that she thinks may have opened when she fell on a slippery floor and hit her back. She had gel pad placed by hospice nurse last week and she states her skin tolerated it, so she is wondering where to get this supply from and if there is anything else she needs to be doing for the  wound. Patient will return in 3 weeks.   Total Session Time   Timed Code Treatment Minutes 48   Total Treatment Time (sum of timed and untimed services) 48   AMBULATORY CLINICS ONLY-MEDICAL AND TREATMENT DIAGNOSIS   Medical diagnosis Lymphedema, Breast CA   Therapy diagnosis Left UE lymphedema, hyperplasia, firbosis, Metastatic Breast CA, decreased shoulder ROM, decreased elbow ROM

## 2022-10-12 NOTE — TELEPHONE ENCOUNTER
General Call      Reason for Call:  Hospice admit to care     What are your questions or concerns:  Lilly calling from [facility].  They need a verbal ok to admit to hospice care ASAP today.    Please call [phone]

## 2022-10-13 NOTE — PROGRESS NOTES
ANTICOAGULATION MANAGEMENT     Elvira Bush 82 year old female is on warfarin with subtherapeutic INR result. (Goal INR 2.0-3.0)    Recent labs: (last 7 days)     10/13/22  1151   INR 1.8*       ASSESSMENT       Source(s): Chart Review, Patient/Caregiver Call and Template       Warfarin doses taken: More warfarin taken than planned which may be affecting INR    Diet: No new diet changes identified    New illness, injury, or hospitalization: No    Medication/supplement changes: None noted    Signs or symptoms of bleeding or clotting: No    Previous INR: Therapeutic last 2(+) visits    Additional findings: Per patient and her dtr Na, Hospice is going to take over care for the patient including INR's- made aware that [facility] will be calling Na to follow up later or early next week.       PLAN     Recommended plan for ongoing change(s) affecting INR     Dosing Instructions: Continue your current warfarin dose ( 30 % increase from previous plan but ) % from total taken by patient ) with next INR in 2 weeks       Summary  As of 10/13/2022    Full warfarin instructions:  2.5 mg every Tue; 5 mg all other days   Next INR check:  10/27/2022             Telephone call with  Pat and Dtr Na who verbalizes understanding and agrees to plan    Will follow up to confirm if Hospice is taking over cares    Education provided: Goal range and significance of current result, Importance of therapeutic range, Importance of following up at instructed interval and Importance of taking warfarin as instructed    Plan made per [facility] anticoagulation protocol    Santa Shultz RN  Anticoagulation Clinic  10/13/2022    _______________________________________________________________________     Anticoagulation Episode Summary     Current INR goal:  2.0-3.0   TTR:  58.6 % (1 y)   Target end date:  Indefinite   Send INR reminders to:  [facility]    Indications    Chronic atrial fibrillation (H) [I48.20]  Chronic deep vein thrombosis  (DVT) of axillary vein (H) [I82.A29]           Comments:           Anticoagulation Care Providers     Provider Role Specialty Phone number    Loli Alberts MD Referring Family Medicine [phone]

## 2022-10-14 NOTE — TELEPHONE ENCOUNTER
Patient is calling for clarification of warfarin dosing  Triager advised patient that according to dosage on 10/14/22 she is advised to take 2.5mg on Tuesday and other days of the week 5mg  Triage guidelines recommend to home care  Caller verbalized and understands directives    Reason for Disposition    Information only question and nurse able to answer    Additional Information    Negative: Nursing judgment    Negative: Nursing judgment    Negative: Nursing judgment    Negative: Nursing judgment    Protocols used: INFORMATION ONLY CALL - NO TRIAGE-A-OH

## 2022-10-14 NOTE — TELEPHONE ENCOUNTER
Geetha from [facility] calls stating that patient will be under their care going forward including management of INRs. Advised Geetha of patient's current warfarin dosing and recheck date per calendar. Called patient and advised her of this as well per Geetha's request.

## 2022-10-17 NOTE — TELEPHONE ENCOUNTER
Forms Request  Name of form/paperwork: [facility]  Have you been seen for this request: N/A  Do we have the form: yes, in providers inbox  When is form needed by: when done  How would you like the form returned: fax  Patient Notified form requests are processed in 3-5 business days: n/a    Okay to leave a detailed message? n/a

## 2022-10-19 NOTE — PROGRESS NOTES
ANTICOAGULATION  MANAGEMENT    Elvira Bush is being discharged from the [facility]tico[facility] ([facility]).    Reason for discharge: care has been transferred to [facility]    Anticoagulation episode resolved, [facility] referral closed and Standing order discontinued    See encounter dated 10/14/2022    Santa Shultz RN

## 2022-10-19 NOTE — TELEPHONE ENCOUNTER
Kym's daughter Na called on behalf of the patient.    She called to cancel the palliative follow up that she had scheduled in Jan.     She informed me that Kym is now entering Hospice and will no longer need any follow up appts.

## 2022-11-16 NOTE — ADDENDUM NOTE
Encounter addended by: Loli Galicia, PT on: 11/16/2022 2:58 PM   Actions taken: Episode resolved, Clinical Note Signed

## 2022-11-16 NOTE — PROGRESS NOTES
[facility]    Outpatient Physical Therapy Discharge Note  Patient: Elvira Bush    : 1940      Beginning/End Dates of Reporting Period:  10/10/2022 to 2022    Referring Provider: Dr. Durán    Therapy Diagnosis: Lymphedema     Client Self Report: see daily doc flowsheet    Objective Measurements: see daily doc flowsheet    Goals: see daily doc flowsheet    Plan: see daily doc flowsheet    Discharge: yes, patient did not return for further PT.

## 2022-12-27 ENCOUNTER — MEDICAL CORRESPONDENCE (OUTPATIENT)
Dept: HEALTH INFORMATION MANAGEMENT | Facility: CLINIC | Age: 82
End: 2022-12-27

## 2022-12-31 NOTE — ADDENDUM NOTE
Encounter addended by: Elvira Can, OT on: 12/30/2022 9:26 PM   Actions taken: Episode resolved, Clinical Note Signed, Flowsheet accepted

## 2022-12-31 NOTE — PROGRESS NOTES
[facility]    Outpatient Occupational Therapy Discharge Note  Patient: Elvira Bush  : 1940    Beginning/End Dates of Reporting Period:  22 to 10-11-22    Referring Provider: Dr. Loli Alberts    Therapy Diagnosis: left UE limited AROM, strength and sensation, decreased ADL and IADL function    Goals:     Goal Identifier     Goal Description Patient will demonstrate knowledge of Adaptive equipment to increase ease and independence with ADL's and IADL's   Target Date 22   Date Met  10/11/22   Progress (detail required for progress note):       Goal Identifier     Goal Description Patient will demonstrate knowledge of compensatory strategies for one handed ADL and IADL's   Target Date 22   Date Met  10/11/22   Progress (detail required for progress note):       Plan:  Discharge from therapy.